# Patient Record
Sex: FEMALE | Race: WHITE | Employment: UNEMPLOYED | URBAN - METROPOLITAN AREA
[De-identification: names, ages, dates, MRNs, and addresses within clinical notes are randomized per-mention and may not be internally consistent; named-entity substitution may affect disease eponyms.]

---

## 2017-01-19 ENCOUNTER — TELEPHONE (OUTPATIENT)
Dept: FAMILY MEDICINE CLINIC | Age: 42
End: 2017-01-19

## 2017-01-25 ENCOUNTER — OFFICE VISIT (OUTPATIENT)
Dept: FAMILY MEDICINE CLINIC | Age: 42
End: 2017-01-25

## 2017-01-25 VITALS
OXYGEN SATURATION: 98 % | DIASTOLIC BLOOD PRESSURE: 70 MMHG | WEIGHT: 121 LBS | BODY MASS INDEX: 20.14 KG/M2 | RESPIRATION RATE: 16 BRPM | HEART RATE: 78 BPM | SYSTOLIC BLOOD PRESSURE: 110 MMHG

## 2017-01-25 DIAGNOSIS — M79.7 FIBROMYALGIA: Primary | ICD-10-CM

## 2017-01-25 DIAGNOSIS — H72.92 OTITIS MEDIA, SEROUS, TM RUPTURE, LEFT: ICD-10-CM

## 2017-01-25 DIAGNOSIS — H65.92 OTITIS MEDIA, SEROUS, TM RUPTURE, LEFT: ICD-10-CM

## 2017-01-25 DIAGNOSIS — F50.2 BULIMIA NERVOSA, PURGING TYPE: ICD-10-CM

## 2017-01-25 PROCEDURE — 99214 OFFICE O/P EST MOD 30 MIN: CPT | Performed by: FAMILY MEDICINE

## 2017-01-25 RX ORDER — CIPROFLOXACIN AND DEXAMETHASONE 3; 1 MG/ML; MG/ML
4 SUSPENSION/ DROPS AURICULAR (OTIC) 2 TIMES DAILY
Qty: 1 BOTTLE | Refills: 1 | Status: SHIPPED | OUTPATIENT
Start: 2017-01-25 | End: 2017-02-04

## 2017-01-25 RX ORDER — GABAPENTIN 100 MG/1
CAPSULE ORAL
Qty: 60 CAPSULE | Refills: 2 | Status: SHIPPED | OUTPATIENT
Start: 2017-01-25 | End: 2017-10-16 | Stop reason: ALTCHOICE

## 2017-01-30 ENCOUNTER — OFFICE VISIT (OUTPATIENT)
Dept: PSYCHOLOGY | Age: 42
End: 2017-01-30

## 2017-01-30 DIAGNOSIS — F50.2 BULIMIA NERVOSA, PURGING TYPE: Primary | ICD-10-CM

## 2017-01-30 PROCEDURE — 90832 PSYTX W PT 30 MINUTES: CPT | Performed by: PSYCHOLOGIST

## 2017-03-15 DIAGNOSIS — T78.40XD ALLERGY, SUBSEQUENT ENCOUNTER: ICD-10-CM

## 2017-03-15 DIAGNOSIS — R05.9 COUGH: ICD-10-CM

## 2017-03-15 RX ORDER — MONTELUKAST SODIUM 10 MG/1
TABLET ORAL
Qty: 30 TABLET | Refills: 5 | Status: SHIPPED | OUTPATIENT
Start: 2017-03-15 | End: 2017-10-16 | Stop reason: ALTCHOICE

## 2017-04-07 ENCOUNTER — OFFICE VISIT (OUTPATIENT)
Dept: FAMILY MEDICINE CLINIC | Age: 42
End: 2017-04-07

## 2017-04-07 VITALS
SYSTOLIC BLOOD PRESSURE: 104 MMHG | WEIGHT: 123 LBS | DIASTOLIC BLOOD PRESSURE: 62 MMHG | OXYGEN SATURATION: 98 % | HEART RATE: 77 BPM | BODY MASS INDEX: 20.47 KG/M2 | TEMPERATURE: 98.8 F

## 2017-04-07 DIAGNOSIS — J01.90 ACUTE NON-RECURRENT SINUSITIS, UNSPECIFIED LOCATION: Primary | ICD-10-CM

## 2017-04-07 DIAGNOSIS — G43.909 MIGRAINE WITHOUT STATUS MIGRAINOSUS, NOT INTRACTABLE, UNSPECIFIED MIGRAINE TYPE: ICD-10-CM

## 2017-04-07 PROCEDURE — 99214 OFFICE O/P EST MOD 30 MIN: CPT | Performed by: FAMILY MEDICINE

## 2017-04-07 PROCEDURE — 96372 THER/PROPH/DIAG INJ SC/IM: CPT | Performed by: FAMILY MEDICINE

## 2017-04-07 RX ORDER — AZELASTINE 1 MG/ML
SPRAY, METERED NASAL
Qty: 1 BOTTLE | Refills: 11 | Status: SHIPPED | OUTPATIENT
Start: 2017-04-07 | End: 2017-10-16 | Stop reason: ALTCHOICE

## 2017-04-07 RX ORDER — PROMETHAZINE HYDROCHLORIDE 25 MG/1
25 TABLET ORAL EVERY 8 HOURS PRN
Qty: 10 TABLET | Refills: 0 | Status: SHIPPED | OUTPATIENT
Start: 2017-04-07 | End: 2017-04-14

## 2017-04-07 RX ORDER — AMOXICILLIN AND CLAVULANATE POTASSIUM 875; 125 MG/1; MG/1
1 TABLET, FILM COATED ORAL 2 TIMES DAILY
Qty: 20 TABLET | Refills: 0 | Status: SHIPPED | OUTPATIENT
Start: 2017-04-07 | End: 2019-05-09 | Stop reason: SDUPTHER

## 2017-04-07 RX ORDER — FLUTICASONE PROPIONATE 50 MCG
SPRAY, SUSPENSION (ML) NASAL
Qty: 1 BOTTLE | Refills: 11 | Status: SHIPPED | OUTPATIENT
Start: 2017-04-07 | End: 2017-10-16 | Stop reason: ALTCHOICE

## 2017-04-11 ASSESSMENT — ENCOUNTER SYMPTOMS
SHORTNESS OF BREATH: 0
HOARSE VOICE: 0
COUGH: 1
SINUS PRESSURE: 1
SORE THROAT: 0
SWOLLEN GLANDS: 1

## 2017-06-29 RX ORDER — RIZATRIPTAN BENZOATE 10 MG/1
TABLET, ORALLY DISINTEGRATING ORAL
Qty: 12 TABLET | Refills: 5 | Status: SHIPPED | OUTPATIENT
Start: 2017-06-29 | End: 2017-10-16 | Stop reason: ALTCHOICE

## 2017-08-15 ENCOUNTER — OFFICE VISIT (OUTPATIENT)
Dept: FAMILY MEDICINE CLINIC | Age: 42
End: 2017-08-15

## 2017-08-15 VITALS
SYSTOLIC BLOOD PRESSURE: 108 MMHG | DIASTOLIC BLOOD PRESSURE: 76 MMHG | TEMPERATURE: 98.9 F | HEART RATE: 64 BPM | WEIGHT: 124 LBS | BODY MASS INDEX: 20.63 KG/M2

## 2017-08-15 DIAGNOSIS — R53.83 FATIGUE, UNSPECIFIED TYPE: Primary | ICD-10-CM

## 2017-08-15 DIAGNOSIS — R53.83 FATIGUE, UNSPECIFIED TYPE: ICD-10-CM

## 2017-08-15 DIAGNOSIS — M54.2 NECK PAIN: Primary | ICD-10-CM

## 2017-08-15 PROCEDURE — 99213 OFFICE O/P EST LOW 20 MIN: CPT | Performed by: NURSE PRACTITIONER

## 2017-08-15 RX ORDER — MELOXICAM 7.5 MG/1
7.5 TABLET ORAL DAILY
Qty: 30 TABLET | Refills: 3 | Status: SHIPPED | OUTPATIENT
Start: 2017-08-15 | End: 2017-10-16 | Stop reason: ALTCHOICE

## 2017-08-15 RX ORDER — METHOCARBAMOL 500 MG/1
500 TABLET, FILM COATED ORAL 4 TIMES DAILY
Qty: 40 TABLET | Refills: 0 | Status: SHIPPED | OUTPATIENT
Start: 2017-08-15 | End: 2017-08-25

## 2017-08-16 ENCOUNTER — NURSE ONLY (OUTPATIENT)
Dept: FAMILY MEDICINE CLINIC | Age: 42
End: 2017-08-16

## 2017-08-16 DIAGNOSIS — R53.82 CHRONIC FATIGUE: ICD-10-CM

## 2017-08-16 DIAGNOSIS — R53.83 FATIGUE, UNSPECIFIED TYPE: ICD-10-CM

## 2017-08-16 LAB
BASOPHILS ABSOLUTE: 0 K/UL (ref 0–0.2)
BASOPHILS RELATIVE PERCENT: 0.6 %
EOSINOPHILS ABSOLUTE: 0.2 K/UL (ref 0–0.6)
EOSINOPHILS RELATIVE PERCENT: 4.6 %
FOLATE: >20 NG/ML (ref 4.78–24.2)
HCT VFR BLD CALC: 42.5 % (ref 36–48)
HEMOGLOBIN: 14.2 G/DL (ref 12–16)
LYMPHOCYTES ABSOLUTE: 1 K/UL (ref 1–5.1)
LYMPHOCYTES RELATIVE PERCENT: 26 %
MCH RBC QN AUTO: 31 PG (ref 26–34)
MCHC RBC AUTO-ENTMCNC: 33.3 G/DL (ref 31–36)
MCV RBC AUTO: 92.9 FL (ref 80–100)
MONOCYTES ABSOLUTE: 0.3 K/UL (ref 0–1.3)
MONOCYTES RELATIVE PERCENT: 7.6 %
NEUTROPHILS ABSOLUTE: 2.4 K/UL (ref 1.7–7.7)
NEUTROPHILS RELATIVE PERCENT: 61.2 %
PDW BLD-RTO: 14 % (ref 12.4–15.4)
PLATELET # BLD: 200 K/UL (ref 135–450)
PMV BLD AUTO: 11.4 FL (ref 5–10.5)
RBC # BLD: 4.58 M/UL (ref 4–5.2)
TSH REFLEX FT4: 1.03 UIU/ML (ref 0.27–4.2)
VITAMIN B-12: 1857 PG/ML (ref 211–911)
WBC # BLD: 4 K/UL (ref 4–11)

## 2017-08-16 PROCEDURE — 36415 COLL VENOUS BLD VENIPUNCTURE: CPT | Performed by: NURSE PRACTITIONER

## 2017-08-17 LAB — VITAMIN D 25-HYDROXY: 83.7 NG/ML

## 2017-08-29 ENCOUNTER — TELEPHONE (OUTPATIENT)
Dept: FAMILY MEDICINE CLINIC | Age: 42
End: 2017-08-29

## 2017-08-29 ENCOUNTER — HOSPITAL ENCOUNTER (OUTPATIENT)
Dept: OTHER | Age: 42
Discharge: HOME | End: 2017-08-29
Attending: NURSE PRACTITIONER | Admitting: NURSE PRACTITIONER

## 2017-08-29 ENCOUNTER — HOSPITAL ENCOUNTER (OUTPATIENT)
Dept: GENERAL RADIOLOGY | Age: 42
Discharge: HOME | End: 2017-08-29

## 2017-08-29 DIAGNOSIS — M54.2 NECK PAIN: ICD-10-CM

## 2017-08-29 DIAGNOSIS — M54.2 NECK PAIN: Primary | ICD-10-CM

## 2017-08-30 DIAGNOSIS — M54.2 NECK PAIN: Primary | ICD-10-CM

## 2017-09-07 ENCOUNTER — HOSPITAL ENCOUNTER (OUTPATIENT)
Dept: PHYSICAL THERAPY | Age: 42
Discharge: HOME | End: 2017-09-30
Admitting: NURSE PRACTITIONER

## 2017-09-07 ASSESSMENT — PAIN DESCRIPTION - PAIN TYPE: TYPE: CHRONIC PAIN

## 2017-09-07 ASSESSMENT — PAIN SCALES - GENERAL: PAINLEVEL_OUTOF10: 6

## 2017-09-07 ASSESSMENT — PAIN DESCRIPTION - LOCATION: LOCATION: NECK

## 2017-09-07 NOTE — FLOWSHEET NOTE
medical complications  [] Other:     Goals:    Short term goals  Time Frame for Short term goals: 4 weeks  Short term goal 1: pt will be indep in HEP  Short term goal 2: pt will decrease pain 50%  Short term goal 3: pt will demonstrate improved scpaular resting position BUE  Short term goal 4: pt will increase B scapular muscle strength to 4/5  Short term goal 5: pt will return tp sitting for greater than 30 minutes without increase in symptoms           Plan: [x] Continue per plan of care [] Alter current plan (see comments)   [x] Plan of care initiated [] Hold pending MD visit [] Discharge      Plan for Next Session:  Biomechanical correction of problems as they present. Facilitate correct muscle firing patterns and activation with appropriate activities. Progress patient as tolerated.      Re-Certification Due Date:         Signature:  Mike Mcdonough PT
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
## 2017-09-26 ENCOUNTER — OFFICE VISIT (OUTPATIENT)
Dept: ENT CLINIC | Age: 42
End: 2017-09-26

## 2017-09-26 ENCOUNTER — TELEPHONE (OUTPATIENT)
Dept: ENT CLINIC | Age: 42
End: 2017-09-26

## 2017-09-26 VITALS
BODY MASS INDEX: 20.59 KG/M2 | DIASTOLIC BLOOD PRESSURE: 64 MMHG | TEMPERATURE: 98 F | HEART RATE: 89 BPM | WEIGHT: 123.6 LBS | SYSTOLIC BLOOD PRESSURE: 102 MMHG | HEIGHT: 65 IN

## 2017-09-26 DIAGNOSIS — H72.92 PERFORATION OF TYMPANIC MEMBRANE, LEFT: Primary | ICD-10-CM

## 2017-09-26 DIAGNOSIS — H92.12 OTORRHEA OF LEFT EAR: ICD-10-CM

## 2017-09-26 PROCEDURE — 99212 OFFICE O/P EST SF 10 MIN: CPT | Performed by: OTOLARYNGOLOGY

## 2017-09-27 RX ORDER — CIPROFLOXACIN AND DEXAMETHASONE 3; 1 MG/ML; MG/ML
4 SUSPENSION/ DROPS AURICULAR (OTIC) 2 TIMES DAILY
Qty: 7.5 ML | Refills: 1 | OUTPATIENT
Start: 2017-09-27 | End: 2017-10-04

## 2017-10-16 ENCOUNTER — OFFICE VISIT (OUTPATIENT)
Dept: FAMILY MEDICINE CLINIC | Age: 42
End: 2017-10-16

## 2017-10-16 VITALS
TEMPERATURE: 98.7 F | WEIGHT: 125 LBS | OXYGEN SATURATION: 98 % | BODY MASS INDEX: 21.12 KG/M2 | DIASTOLIC BLOOD PRESSURE: 70 MMHG | SYSTOLIC BLOOD PRESSURE: 102 MMHG | HEART RATE: 64 BPM

## 2017-10-16 DIAGNOSIS — F31.9 BIPOLAR DEPRESSION (HCC): Primary | ICD-10-CM

## 2017-10-16 DIAGNOSIS — Z23 NEEDS FLU SHOT: ICD-10-CM

## 2017-10-16 PROCEDURE — 90471 IMMUNIZATION ADMIN: CPT | Performed by: FAMILY MEDICINE

## 2017-10-16 PROCEDURE — 99214 OFFICE O/P EST MOD 30 MIN: CPT | Performed by: FAMILY MEDICINE

## 2017-10-16 PROCEDURE — 90630 INFLUENZA, QUADV, 18-64 YRS, ID, PF, MICRO INJ, 0.1ML (FLUZONE QUADV, PF): CPT | Performed by: FAMILY MEDICINE

## 2017-10-16 RX ORDER — LAMOTRIGINE 25 MG/1
25 TABLET ORAL 2 TIMES DAILY
Qty: 60 TABLET | Refills: 1 | Status: SHIPPED | OUTPATIENT
Start: 2017-10-16 | End: 2017-12-03 | Stop reason: SDUPTHER

## 2017-10-17 ASSESSMENT — ENCOUNTER SYMPTOMS
WHEEZING: 0
ABDOMINAL DISTENTION: 0
CONSTIPATION: 0
CHEST TIGHTNESS: 0
DIARRHEA: 0
VOMITING: 0

## 2017-10-30 ENCOUNTER — OFFICE VISIT (OUTPATIENT)
Dept: FAMILY MEDICINE CLINIC | Age: 42
End: 2017-10-30

## 2017-10-30 VITALS
WEIGHT: 125.6 LBS | SYSTOLIC BLOOD PRESSURE: 110 MMHG | HEART RATE: 68 BPM | DIASTOLIC BLOOD PRESSURE: 70 MMHG | BODY MASS INDEX: 21.23 KG/M2 | OXYGEN SATURATION: 98 %

## 2017-10-30 DIAGNOSIS — N94.9 VAGINAL BURNING: Primary | ICD-10-CM

## 2017-10-30 DIAGNOSIS — F31.9 BIPOLAR DEPRESSION (HCC): ICD-10-CM

## 2017-10-30 LAB
BILIRUBIN, POC: NEGATIVE
BLOOD URINE, POC: NEGATIVE
CLARITY, POC: CLEAR
COLOR, POC: YELLOW
GLUCOSE URINE, POC: NEGATIVE
KETONES, POC: NEGATIVE
LEUKOCYTE EST, POC: NEGATIVE
NITRITE, POC: NEGATIVE
PH, POC: 8.5
PROTEIN, POC: NEGATIVE
SPECIFIC GRAVITY, POC: 1
UROBILINOGEN, POC: NEGATIVE

## 2017-10-30 PROCEDURE — 81002 URINALYSIS NONAUTO W/O SCOPE: CPT | Performed by: FAMILY MEDICINE

## 2017-10-30 PROCEDURE — 99213 OFFICE O/P EST LOW 20 MIN: CPT | Performed by: FAMILY MEDICINE

## 2017-10-30 RX ORDER — RIZATRIPTAN BENZOATE 10 MG/1
10 TABLET, ORALLY DISINTEGRATING ORAL
COMMUNITY
End: 2018-02-22

## 2017-11-09 ENCOUNTER — TELEPHONE (OUTPATIENT)
Dept: FAMILY MEDICINE CLINIC | Age: 42
End: 2017-11-09

## 2017-11-09 NOTE — TELEPHONE ENCOUNTER
Patient called in stating that while taking the medication Lamictal as prescribed, today she feels really sad and doesn't want to go anywhere. Please follow- up with this patient.

## 2017-12-12 ENCOUNTER — OFFICE VISIT (OUTPATIENT)
Dept: ENT CLINIC | Age: 42
End: 2017-12-12

## 2017-12-12 VITALS
BODY MASS INDEX: 19.29 KG/M2 | WEIGHT: 120 LBS | HEART RATE: 78 BPM | SYSTOLIC BLOOD PRESSURE: 115 MMHG | DIASTOLIC BLOOD PRESSURE: 74 MMHG | HEIGHT: 66 IN

## 2017-12-12 DIAGNOSIS — H72.92 PERFORATION OF TYMPANIC MEMBRANE, LEFT: Primary | ICD-10-CM

## 2017-12-12 DIAGNOSIS — H92.12 OTORRHEA, LEFT: ICD-10-CM

## 2017-12-12 PROCEDURE — 99212 OFFICE O/P EST SF 10 MIN: CPT | Performed by: OTOLARYNGOLOGY

## 2017-12-12 NOTE — PROGRESS NOTES
FOLLOW UP VISIT:      INTERIM HISTORY: Left-sided tympanic membrane perforation. Long standing. Has conductive hearing loss. Gets intermittent otorrhea. PAST MEDICAL HISTORY:   History   Smoking Status    Never Smoker   Smokeless Tobacco    Never Used                                                    History   Alcohol Use    Yes     Comment: once a month                                                     Current Outpatient Prescriptions:     lamoTRIgine (LAMICTAL) 25 MG tablet, Take 1 tablet by mouth 2 times daily, Disp: 60 tablet, Rfl: 5    rizatriptan (MAXALT-MLT) 10 MG disintegrating tablet, Take 10 mg by mouth once as needed for Migraine May repeat in 2 hours if needed, Disp: , Rfl:     LORazepam (ATIVAN) 0.5 MG tablet, TAKE ONE TABLET BY MOUTH DAILY, Disp: 20 tablet, Rfl: 0    FAMCICLOVIR PO, Take by mouth 2 times daily, Disp: , Rfl:                                                  Past Medical History:   Diagnosis Date    Allergy history unknown     Anxiety     Bulimia     Depression     Has been dx'd with bipolar 2, ocd, personality d/o as well. Unclear what dx is correct.  Fibromyalgia     Migraine     b/l    Perforation of tympanic membrane     Dr Yusuf Gutierrez, multipe perforations                                                    Past Surgical History:   Procedure Laterality Date    BREAST IMPLANT REMOVAL  1/14    BREAST SURGERY      INNER EAR SURGERY Right     mastoidectomy, age 16 or 16, had cholesteocytoma    MASTOIDECTOMY      TONSILLECTOMY      TYMPANIC MEMBRANE REPAIR Right          REVIEW OF SYSTEMS:  Al pertinent positive and negative findings included in HPI.   Otherwise, all other systems are reviewed and are negative    PHYSICAL EXAMINATION:   GENERAL: wdwn- no acute distress  COMMUNICATION :  Normal voice  MENTAL STATUS:  Normal  HEAD AND FACE:  Normal  EXTERNAL EARS AND NOSE:  Normal  FACIAL MUSCLES:  Normal  FACE PALPATION:  Negative  OTOSCOPY:  Left-sided Central

## 2017-12-14 ENCOUNTER — OFFICE VISIT (OUTPATIENT)
Dept: FAMILY MEDICINE CLINIC | Age: 42
End: 2017-12-14

## 2017-12-14 VITALS
WEIGHT: 124 LBS | TEMPERATURE: 98.6 F | BODY MASS INDEX: 20.01 KG/M2 | DIASTOLIC BLOOD PRESSURE: 70 MMHG | SYSTOLIC BLOOD PRESSURE: 110 MMHG | HEART RATE: 68 BPM

## 2017-12-14 DIAGNOSIS — Z01.818 PREOP EXAMINATION: Primary | ICD-10-CM

## 2017-12-14 PROCEDURE — 99242 OFF/OP CONSLTJ NEW/EST SF 20: CPT | Performed by: NURSE PRACTITIONER

## 2017-12-14 NOTE — PROGRESS NOTES
[address] [employer]                          Date of Procedure 12-15-17 Time of Procedure 1300    PRIOR TO PROCEDURE DATE:  1. Please follow any guidelines/instructions prior to your procedure as advised by your surgeon. 2. Arrange for someone to drive you home and be with you for the first 24 hours after discharge for your safety after your procedure for which you received sedation. Ensure it is someone we can share information with regarding your discharge. 3. You must contact your surgeon for instructions IF:   You are taking any blood thinners, aspirin, anti-inflammatory or vitamin E.   There is a change in your physical condition such as a cold, fever, rash, cuts, sores or any other infection, especially near your surgical site. 4. Do not drink alcohol the day before or day of your procedure. 5. A Pre-op History and Physical for surgery MUST be completed by your Physician or Urgent Care within 30 days of your procedure date. Please bring a copy with you on the day of your procedure and along with any other testing performed. THE DAY OF YOUR PROCEDURE:  1. Follow instructions for ARRIVAL TIME as DIRECTED BY YOUR SURGEON. If your surgeon does not give you a specific arrival time, please arrive at  1100.    2. Enter the MAIN entrance from [facility] and follow the signs to the free [facility] or [email or web address] parking (offered free of charge 6am-5pm). 3. Enter the Main Entrance of the hospital (do not enter from the lower level of the parking garage). Upon entrance, check in with the  at the main desk on your left. If no one is available at the desk, proceed into the [facility] Waiting Room and go through the door directly into the [facility]. There is a Check-in desk ACROSS from Room 5 (marked with a sign hanging from the ceiling). The phone number for the surgery center is [phone].     4. Please call [phone] option #2 option have any large items you may need brought in by your family after your arrival to your hospital room. 15. If you have a Living Will or Durable Power of , please bring a copy on the day of your procedure. 15. With your permission, one family member may accompany you while you are being prepared for surgery. Once you are ready, additional family members may join you. HOW WE KEEP YOU SAFE and WORK TO PREVENT SURGICAL SITE INFECTIONS:  1. Health care workers should always check your ID bracelet to verify your name and birth date. You will be asked many times to state your name, date of birth, and allergies. 2. Health care workers should always clean their hands with soap or alcohol gel before providing care to you. It is okay to ask anyone if they cleaned their hands before they touch you. 3. You will be actively involved in verifying the type of procedure you are having and ensuring the correct surgical site. This will be confirmed multiple times prior to your procedure. Do NOT ru your surgery site UNLESS instructed to by your surgeon. 4. Do not shave or wax for 72 hours prior to procedure near your operative site. Shaving with a razor can irritate your skin and make it easier to develop an infection. On the day of your procedure, any hair that needs to be removed near the surgical site will be clipped by a healthcare worker using a special clippers designed to avoid skin irritation. 5. When you are in the operating room, your surgical site will be cleansed with a special soap, and in most cases, you will be given an antibiotic before the surgery begins. AFTER YOUR PROCEDURE:  1. For comfort and safety, arrange to have someone at home with you for the first 24 hours after discharge. 2. You and your family will be given written instructions about your diet, activity, dressing care, medications, and return visits.      3. Always clean your hands before and after caring for your

## 2017-12-14 NOTE — PROGRESS NOTES
[facility]  [phone]  Fax: [phone]   Pre-operative History and Physical      DIAGNOSIS:  Left sided tympanic membrane perforation with conductive hearing loss. PROCEDURE:  Left Tympanoplasty      History Obtained From:  patient    HISTORY OF PRESENT ILLNESS:    The patient is a 40 y.o. female with significant past medical history of left otalgia who presents for pre op exam.       Past Medical History:   Diagnosis Date    Allergy history unknown     Anxiety     Bulimia     Depression     Has been dx'd with bipolar 2, ocd, personality d/o as well. Unclear what dx is correct.  Fibromyalgia     Migraine     b/l    Perforation of tympanic membrane     Dr Rigoberto Hill, multipe perforations     Past Surgical History:   Procedure Laterality Date    BREAST IMPLANT REMOVAL  1/14    BREAST SURGERY      INNER EAR SURGERY Right     mastoidectomy, age 16 or 16, had cholesteocytoma    MASTOIDECTOMY      TONSILLECTOMY      TYMPANIC MEMBRANE REPAIR Right      Current Outpatient Prescriptions   Medication Sig Dispense Refill    rizatriptan (MAXALT-MLT) 10 MG disintegrating tablet Take 10 mg by mouth once as needed for Migraine May repeat in 2 hours if needed      LORazepam (ATIVAN) 0.5 MG tablet TAKE ONE TABLET BY MOUTH DAILY 20 tablet 0    FAMCICLOVIR PO Take by mouth 2 times daily      CIPRODEX 0.3-0.1 % otic suspension        No current facility-administered medications for this visit. Allergies:  Celebrex [celecoxib]; Codeine; Darvocet [propoxyphene n-acetaminophen]; Percocet [oxycodone-acetaminophen]; Ultram [tramadol hcl]; and Vicodin [hydrocodone-acetaminophen]  History of allergic reaction to anesthesia:  No     History   Smoking Status    Never Smoker   Smokeless Tobacco    Never Used     The patient states she drinks 0 per week.     Family History   Problem Relation Age of Onset    Other Mother      fibromyalgia    Mult Sclerosis Sister        REVIEW OF SYSTEMS: CONSTITUTIONAL:  negative  EYES:  negative  HEENT:  negative except for  Left ear pain  RESPIRATORY:  negative  CARDIOVASCULAR:  negative  GASTROINTESTINAL:  negative  GENITOURINARY:  negative  INTEGUMENT/BREAST:  negative  HEMATOLOGIC/LYMPHATIC:  negative  ALLERGIC/IMMUNOLOGIC:  negative  ENDOCRINE:  negative  MUSCULOSKELETAL:  Has some neck soreness at times  NEUROLOGICAL:  negative    PHYSICAL EXAM:      /70   Pulse 68   Temp 98.6 °F (37 °C) (Oral)   Wt 124 lb (56.2 kg)   LMP 11/17/2017   BMI 20.01 kg/m²     CONSTITUTIONAL:  awake, alert, cooperative, no apparent distress, and appears stated age    Eyes:  Lids and lashes normal, pupils equal, round and reactive to light, extra ocular muscles intact, sclera clear, conjunctiva normal    Head/ENT:  Normocephalic, without obvious abnormality, atramatic, sinuses nontender on palpation, external ears without lesions, oral pharynx with moist mucus membranes, tonsils without erythema or exudates, gums normal and good dentition. Bilateral TMs abnormal appearance and shape. Neck:  Supple, symmetrical, trachea midline, no adenopathy, thyroid symmetric, not enlarged and no tenderness, skin normal, No carotid bruit    Heart:  Normal apical impulse, regular rate and rhythm, normal S1 and S2, no S3 or S4, and no murmur noted    Lungs:  No increased work of breathing, good air exchange, clear to auscultation bilaterally, no crackles or wheezing    Abdomen:  No scars, normal bowel sounds, soft, non-distended, non-tender, no masses palpated, no hepatosplenomegally    Extremities:  No clubbing, cyanosis, or edema    NEUROLOGIC:  Awake, alert, oriented to name, place and time. Cranial nerves II-XII are grossly intact. Motor is 5 out of 5 bilaterally. ASSESSMENT AND PLAN:    1. Patient is a 40 y.o. female with above specified procedure planned on 12/15/17 with Dr. Cristiane Ferreira at [facility]  They will not require cardiology evaluation prior to procedure. 2. Stop ASA/NSAIDS medications 7-10 days prior to procedure.   3.Patient is cleared for surgery    Ham Banks, [address] [facility] SPECIAL[facility] OF [address]  [phone]

## 2017-12-15 ENCOUNTER — HOSPITAL ENCOUNTER (OUTPATIENT)
Dept: PREADMISSION TESTING | Age: 42
Discharge: HOME | End: 2017-12-15
Admitting: OTOLARYNGOLOGY

## 2017-12-15 VITALS
TEMPERATURE: 98.9 F | DIASTOLIC BLOOD PRESSURE: 70 MMHG | HEART RATE: 73 BPM | HEIGHT: 65 IN | WEIGHT: 124 LBS | OXYGEN SATURATION: 100 % | BODY MASS INDEX: 20.66 KG/M2 | SYSTOLIC BLOOD PRESSURE: 111 MMHG | RESPIRATION RATE: 16 BRPM

## 2017-12-15 LAB
GLUCOSE BLD-MCNC: 72 MG/DL (ref 70–99)
PERFORMED ON: NORMAL
PREGNANCY, URINE: NEGATIVE

## 2017-12-15 PROCEDURE — 69631 REPAIR EARDRUM STRUCTURES: CPT | Performed by: OTOLARYNGOLOGY

## 2017-12-15 RX ORDER — MEPERIDINE HYDROCHLORIDE 25 MG/ML
12.5 INJECTION INTRAMUSCULAR; INTRAVENOUS; SUBCUTANEOUS EVERY 5 MIN PRN
Status: DISCONTINUED | OUTPATIENT
Start: 2017-12-15 | End: 2017-12-16 | Stop reason: HOSPADM

## 2017-12-15 RX ORDER — LABETALOL HYDROCHLORIDE 5 MG/ML
5 INJECTION, SOLUTION INTRAVENOUS EVERY 10 MIN PRN
Status: DISCONTINUED | OUTPATIENT
Start: 2017-12-15 | End: 2017-12-16 | Stop reason: HOSPADM

## 2017-12-15 RX ORDER — PROMETHAZINE HYDROCHLORIDE 25 MG/ML
6.25 INJECTION, SOLUTION INTRAMUSCULAR; INTRAVENOUS
Status: ACTIVE | OUTPATIENT
Start: 2017-12-15 | End: 2017-12-15

## 2017-12-15 RX ORDER — ONDANSETRON 2 MG/ML
4 INJECTION INTRAMUSCULAR; INTRAVENOUS
Status: ACTIVE | OUTPATIENT
Start: 2017-12-15 | End: 2017-12-15

## 2017-12-15 RX ORDER — FENTANYL CITRATE 50 UG/ML
25 INJECTION, SOLUTION INTRAMUSCULAR; INTRAVENOUS EVERY 5 MIN PRN
Status: DISCONTINUED | OUTPATIENT
Start: 2017-12-15 | End: 2017-12-16 | Stop reason: HOSPADM

## 2017-12-15 RX ORDER — SODIUM CHLORIDE, SODIUM LACTATE, POTASSIUM CHLORIDE, CALCIUM CHLORIDE 600; 310; 30; 20 MG/100ML; MG/100ML; MG/100ML; MG/100ML
INJECTION, SOLUTION INTRAVENOUS CONTINUOUS
Status: DISCONTINUED | OUTPATIENT
Start: 2017-12-15 | End: 2017-12-16 | Stop reason: HOSPADM

## 2017-12-15 RX ORDER — HYDRALAZINE HYDROCHLORIDE 20 MG/ML
5 INJECTION INTRAMUSCULAR; INTRAVENOUS EVERY 10 MIN PRN
Status: DISCONTINUED | OUTPATIENT
Start: 2017-12-15 | End: 2017-12-16 | Stop reason: HOSPADM

## 2017-12-15 RX ADMIN — SODIUM CHLORIDE, SODIUM LACTATE, POTASSIUM CHLORIDE, CALCIUM CHLORIDE: 600; 310; 30; 20 INJECTION, SOLUTION INTRAVENOUS at 11:25

## 2017-12-15 RX ADMIN — FENTANYL CITRATE 25 MCG: 50 INJECTION, SOLUTION INTRAMUSCULAR; INTRAVENOUS at 16:01

## 2017-12-15 ASSESSMENT — PAIN DESCRIPTION - DESCRIPTORS
DESCRIPTORS: ACHING

## 2017-12-15 ASSESSMENT — PAIN - FUNCTIONAL ASSESSMENT: PAIN_FUNCTIONAL_ASSESSMENT: 0-10

## 2017-12-15 ASSESSMENT — PAIN SCALES - GENERAL
PAINLEVEL_OUTOF10: 6
PAINLEVEL_OUTOF10: 0
PAINLEVEL_OUTOF10: 4
PAINLEVEL_OUTOF10: 0
PAINLEVEL_OUTOF10: 0

## 2017-12-15 ASSESSMENT — PAIN DESCRIPTION - PROGRESSION: CLINICAL_PROGRESSION_2: GRADUALLY IMPROVING

## 2017-12-15 NOTE — BRIEF OP NOTE
Brief Postoperative Note    Lee Villalta  YOB: 1975  [id number]    Pre-operative Diagnosis: Perforation left tympanic membrane    Post-operative Diagnosis: Same    Procedure: Left transcanal tympanoplasty    Anesthesia: General    Surgeons/Assistants:  Jaye Rader    Estimated Blood Loss: less than 50     Complications: None    Specimens: Was Not Obtained      Electronically signed by Johanny Wise MD on 12/15/2017 at 2:16 PM

## 2017-12-15 NOTE — OP NOTE
[address] [facility], [address] [facility]                                 OPERATIVE REPORT    PATIENT NAME: James Hernández                :        1975  MED REC NO:   [id number]                          ROOM:  ACCOUNT NO:   [de-identified]                          ADMIT DATE: 12/15/2017  PROVIDER:     Rishabh Roberts MD    DATE OF PROCEDURE:  12/15/2017    PREOPERATIVE DIAGNOSIS:  Central perforation, left tympanic membrane. POSTOPERATIVE DIAGNOSIS:  Central perforation, left tympanic membrane. OPERATION:  Left transcanal tympanoplasty with medial placement of  temporalis fascia graft. SURGEON:  Rishabh Roberts MD    ANESTHESIA:  General with endotracheal intubation. PROCEDURE:  Under satisfactory general anesthesia, the left ear was prepped  and draped in a sterile manner. The external ear canal and the  postauricular area were injected with lidocaine 1% with epinephrine  1:100,000, 4 ml were used. The microscope was draped and brought in. There was central perforation of the left tympanic membrane. The middle  ear mucosa appeared normal.  The edges of the perforation were freshened  with a cup forceps and a sickle knife. A postauricular incision was made  with a cautery pencil set at 25 cut and 25 spray, blend one. The incision  was carried down to the temporalis muscle. Fascia overlying the temporalis  was harvested and set aside for later grafting purposes. The postauricular  incision was closed with multiple sutures of 5-0 nylon. The middle ear  space was filled with Gelfoam impregnated with topical adrenaline. The  previously harvested temporalis fascia was grafted medial to the tympanic  membrane. The external auditory canal was filled with more Gelfoam  impregnated with topical adrenaline. A cotton ball was placed in the  meatus. The dressing was two Band-Aids.   The patient tolerated the  procedure well and was transferred to the recovery room in good condition.         Randy Tuttle MD    D: 12/15/2017 14:19:59       T: 12/15/2017 14:22:10     [email or web address]  Job#: [id number]     Doc#: [id number]    CC:

## 2017-12-15 NOTE — ANESTHESIA POST-OP
Anesthesia Post-op Note    Patient: Jenny Jarvis  MRN: [id number]  YOB: 1975  Date of evaluation: 12/15/2017  Time:  5:26 PM     Procedure(s) Performed: Left transcanal tympanoplasty with medial placement of  temporalis fascia graft.     Last Vitals: /70   Pulse 73   Temp 98.9 °F (37.2 °C) (Temporal)   Resp 16   Ht 5' 5\" (1.651 m)   Wt 124 lb (56.2 kg)   LMP 12/14/2017   SpO2 100%   BMI 20.63 kg/m²     Tracy Phase I: Tracy Score: 10    Tracy Phase II: Tracy Score: 10    Anesthesia Post Evaluation    Final anesthesia type: general  Patient location during evaluation: PACU  Level of consciousness: awake and alert  Airway patency: patent  Nausea & Vomiting: no nausea and no vomiting  Complications: no  Cardiovascular status: hemodynamically stable  Respiratory status: acceptable  Hydration status: euvolemic        Fabiano Marcus MD  5:26 PM

## 2017-12-15 NOTE — PROGRESS NOTES
Pt admitted from OR to  PACU bed 4  at 1410 post LEFT TYMPANOPLASTY. RN handoff report received from crna. Pt was reportedly stable throughout surgery. Pt placed on PACU monitors. Patient reports pain level to be 0 on a 1-10 scale. PACU RN remains at bedside and will continue to monitor closely.

## 2017-12-15 NOTE — PROGRESS NOTES
Crutches   []Drain    [] Amie Laureano   []Other  [] Inpatient / significant other understands the plan for transfer to the inpatient unit

## 2017-12-15 NOTE — ANESTHESIA PRE-OP
ANESTHESIA PRE-OPERATIVE EVALUATION    Patient Name: Lee Villalta YOB: 1975   Sex: Female Age: 40 yrs     Medical Record Number: [id number] Acct Number: [de-identified]     Date of Procedure: 12/15/2017 Time of Procedure: 1300     Preoperative Diagnosis: PEFORATION LEFT EAR DRUM CONDUCTI   Procedure: Left tympanoplasty   Surgeon: Johanny Wise MD    VITAL SIGNS   Vitals:    12/15/17 1044   BP: 103/68   Pulse: 70   Resp: 16   Temp: 98.1 °F (36.7 °C)   SpO2: 100%      Height Height: 5' 5\" (165.1 cm)   Weight Weight: 124 lb (56.2 kg)   BMI Body mass index is 20.63 kg/m². Allergies Allergies   Allergen Reactions    Codeine Nausea Only    Ultram [Tramadol Hcl] Nausea Only    Celebrex [Celecoxib] Itching and Rash    Darvocet [Propoxyphene N-Acetaminophen] Nausea And Vomiting    Percocet [Oxycodone-Acetaminophen] Nausea And Vomiting    Vicodin [Hydrocodone-Acetaminophen] Nausea And Vomiting      NPO Status >8 hours   IV Access     Isolation No active isolations     MEDICAL HISTORY   Past Medical History       Diagnosis Date    Allergy history unknown     Anxiety     Bulimia     Dental crowns present     molar    Depression     Has been dx'd with bipolar 2, ocd, personality d/o as well. Unclear what dx is correct.  Fibromyalgia     Migraine     b/l    Perforation of tympanic membrane     Dr Daisy Morales, multipe perforations      Exercise Tolerance   Patient is able to walk up a flight of stairs or 1-2 blocks without chest pain or shortness of breath? Yes   Anesthetic History   Personal History of Problems: No Family History of Problems: No   Surgical History    has a past surgical history that includes Inner ear surgery (Right); Tonsillectomy; Breast Implant Removal (1/14); tympanic membrane repair (Right); mastoidectomy; and Breast surgery (2013). Social History   Social History     Occupational History    Not on file.      Social History Main Topics    Smoking status: Never Risks and possible complications were explained. Complications include, but are not limited to, sore throat, dental injury or damage, trauma to soft tissue,  eye injury, corneal abrasion, adverse reaction to blood products and/or medications, bleeding, nerve  injury, cardiac or respiratory arrest, and death. The patient and patients family understands these complications and are agreeable to the anesthetic plan. Nursing staff present in room during this   discussion and consent. Informed consent obtained verbally from the patient.     Electronically signed by Fabiano Marcus on 12/15/2017

## 2017-12-20 ENCOUNTER — OFFICE VISIT (OUTPATIENT)
Dept: ENT CLINIC | Age: 42
End: 2017-12-20

## 2017-12-20 DIAGNOSIS — Z98.890 STATUS POST EAR SURGERY: Primary | ICD-10-CM

## 2017-12-20 PROCEDURE — 99024 POSTOP FOLLOW-UP VISIT: CPT | Performed by: OTOLARYNGOLOGY

## 2017-12-21 ENCOUNTER — TELEPHONE (OUTPATIENT)
Dept: VASCULAR SURGERY | Age: 42
End: 2017-12-21

## 2017-12-21 NOTE — TELEPHONE ENCOUNTER
Pt called stating that her ear is starting cause pain now and the Tylenol is not helping post-operatively. The patient can not take any rx opiates or ultram.  She contacted her breast surgeon to see what she was given for pain and told me they gave her dilaudid. I explained that we can not give her dilaudid and asked if she can tolerate NSAIDs. She stated to her knowledge she could and that she has a rx for Naproxen. I asked her to try taking a Naproxen with food and to call me back and let me know if it relieves her pain. Patient verbalized understanding.

## 2018-01-08 ENCOUNTER — OFFICE VISIT (OUTPATIENT)
Dept: ENT CLINIC | Age: 43
End: 2018-01-08

## 2018-01-08 DIAGNOSIS — Z98.890 STATUS POST EAR SURGERY: Primary | ICD-10-CM

## 2018-01-08 PROCEDURE — 99024 POSTOP FOLLOW-UP VISIT: CPT | Performed by: OTOLARYNGOLOGY

## 2018-01-17 ENCOUNTER — OFFICE VISIT (OUTPATIENT)
Dept: ENT CLINIC | Age: 43
End: 2018-01-17

## 2018-01-17 DIAGNOSIS — Z98.890 STATUS POST EAR SURGERY: Primary | ICD-10-CM

## 2018-01-17 PROCEDURE — 99024 POSTOP FOLLOW-UP VISIT: CPT | Performed by: OTOLARYNGOLOGY

## 2018-01-19 ENCOUNTER — HOSPITAL ENCOUNTER (OUTPATIENT)
Dept: MAMMOGRAPHY | Age: 43
Discharge: HOME | End: 2018-01-19
Attending: FAMILY MEDICINE | Admitting: FAMILY MEDICINE

## 2018-01-19 DIAGNOSIS — Z12.31 VISIT FOR SCREENING MAMMOGRAM: ICD-10-CM

## 2018-01-31 ENCOUNTER — OFFICE VISIT (OUTPATIENT)
Dept: ENT CLINIC | Age: 43
End: 2018-01-31

## 2018-01-31 DIAGNOSIS — Z98.890 STATUS POST EAR SURGERY: Primary | ICD-10-CM

## 2018-01-31 PROCEDURE — 99024 POSTOP FOLLOW-UP VISIT: CPT | Performed by: OTOLARYNGOLOGY

## 2018-01-31 NOTE — PROGRESS NOTES
Now 6 weeks following left tympanoplasty. No otalgia or otorrhea. Eschar in ear canal is still adherent.     Follow up 1 month

## 2018-02-22 RX ORDER — RIZATRIPTAN BENZOATE 10 MG/1
TABLET, ORALLY DISINTEGRATING ORAL
Qty: 12 TABLET | Refills: 5 | Status: SHIPPED | OUTPATIENT
Start: 2018-02-22 | End: 2018-08-10 | Stop reason: SDUPTHER

## 2018-03-19 ENCOUNTER — OFFICE VISIT (OUTPATIENT)
Dept: ENT CLINIC | Age: 43
End: 2018-03-19

## 2018-03-19 DIAGNOSIS — Z98.890 STATUS POST EAR SURGERY: Primary | ICD-10-CM

## 2018-03-19 PROCEDURE — 99024 POSTOP FOLLOW-UP VISIT: CPT | Performed by: OTOLARYNGOLOGY

## 2018-05-04 ENCOUNTER — TELEPHONE (OUTPATIENT)
Dept: FAMILY MEDICINE CLINIC | Age: 43
End: 2018-05-04

## 2018-05-07 ENCOUNTER — OFFICE VISIT (OUTPATIENT)
Dept: FAMILY MEDICINE CLINIC | Age: 43
End: 2018-05-07

## 2018-05-07 VITALS
TEMPERATURE: 98 F | BODY MASS INDEX: 21.3 KG/M2 | SYSTOLIC BLOOD PRESSURE: 100 MMHG | DIASTOLIC BLOOD PRESSURE: 70 MMHG | WEIGHT: 128 LBS | HEART RATE: 64 BPM

## 2018-05-07 DIAGNOSIS — F34.1 DYSTHYMIA: Primary | ICD-10-CM

## 2018-05-07 DIAGNOSIS — G89.29 CHRONIC MIDLINE LOW BACK PAIN WITHOUT SCIATICA: ICD-10-CM

## 2018-05-07 DIAGNOSIS — G43.909 MIGRAINE WITHOUT STATUS MIGRAINOSUS, NOT INTRACTABLE, UNSPECIFIED MIGRAINE TYPE: ICD-10-CM

## 2018-05-07 DIAGNOSIS — M54.50 CHRONIC MIDLINE LOW BACK PAIN WITHOUT SCIATICA: ICD-10-CM

## 2018-05-07 PROCEDURE — 99214 OFFICE O/P EST MOD 30 MIN: CPT | Performed by: NURSE PRACTITIONER

## 2018-05-07 PROCEDURE — G0444 DEPRESSION SCREEN ANNUAL: HCPCS | Performed by: NURSE PRACTITIONER

## 2018-05-07 RX ORDER — AMITRIPTYLINE HYDROCHLORIDE 10 MG/1
10 TABLET, FILM COATED ORAL NIGHTLY PRN
Qty: 30 TABLET | Refills: 3 | Status: SHIPPED | OUTPATIENT
Start: 2018-05-07 | End: 2018-07-12

## 2018-05-07 RX ORDER — FLUTICASONE PROPIONATE 50 MCG
1 SPRAY, SUSPENSION (ML) NASAL DAILY
COMMUNITY
End: 2018-07-12

## 2018-05-07 RX ORDER — CITALOPRAM 20 MG/1
TABLET ORAL
Qty: 30 TABLET | Refills: 3 | Status: SHIPPED | OUTPATIENT
Start: 2018-05-07 | End: 2018-07-12

## 2018-05-07 ASSESSMENT — ENCOUNTER SYMPTOMS: BACK PAIN: 1

## 2018-05-07 ASSESSMENT — PATIENT HEALTH QUESTIONNAIRE - PHQ9
SUM OF ALL RESPONSES TO PHQ9 QUESTIONS 1 & 2: 6
9. THOUGHTS THAT YOU WOULD BE BETTER OFF DEAD, OR OF HURTING YOURSELF: 1
3. TROUBLE FALLING OR STAYING ASLEEP: 3
5. POOR APPETITE OR OVEREATING: 3
10. IF YOU CHECKED OFF ANY PROBLEMS, HOW DIFFICULT HAVE THESE PROBLEMS MADE IT FOR YOU TO DO YOUR WORK, TAKE CARE OF THINGS AT HOME, OR GET ALONG WITH OTHER PEOPLE: 3
4. FEELING TIRED OR HAVING LITTLE ENERGY: 3
8. MOVING OR SPEAKING SO SLOWLY THAT OTHER PEOPLE COULD HAVE NOTICED. OR THE OPPOSITE, BEING SO FIGETY OR RESTLESS THAT YOU HAVE BEEN MOVING AROUND A LOT MORE THAN USUAL: 1
7. TROUBLE CONCENTRATING ON THINGS, SUCH AS READING THE NEWSPAPER OR WATCHING TELEVISION: 3
6. FEELING BAD ABOUT YOURSELF - OR THAT YOU ARE A FAILURE OR HAVE LET YOURSELF OR YOUR FAMILY DOWN: 3
1. LITTLE INTEREST OR PLEASURE IN DOING THINGS: 3
2. FEELING DOWN, DEPRESSED OR HOPELESS: 3
SUM OF ALL RESPONSES TO PHQ QUESTIONS 1-9: 23

## 2018-05-09 ENCOUNTER — OFFICE VISIT (OUTPATIENT)
Dept: ENT CLINIC | Age: 43
End: 2018-05-09

## 2018-05-09 VITALS
WEIGHT: 123 LBS | SYSTOLIC BLOOD PRESSURE: 96 MMHG | HEIGHT: 65 IN | DIASTOLIC BLOOD PRESSURE: 57 MMHG | HEART RATE: 71 BPM | TEMPERATURE: 98.6 F | BODY MASS INDEX: 20.49 KG/M2

## 2018-05-09 DIAGNOSIS — Z98.890 STATUS POST EAR SURGERY: Primary | ICD-10-CM

## 2018-05-09 PROCEDURE — 99024 POSTOP FOLLOW-UP VISIT: CPT | Performed by: OTOLARYNGOLOGY

## 2018-07-12 ENCOUNTER — OFFICE VISIT (OUTPATIENT)
Dept: FAMILY MEDICINE CLINIC | Age: 43
End: 2018-07-12

## 2018-07-12 VITALS
BODY MASS INDEX: 20.97 KG/M2 | SYSTOLIC BLOOD PRESSURE: 110 MMHG | DIASTOLIC BLOOD PRESSURE: 70 MMHG | WEIGHT: 126 LBS | HEART RATE: 77 BPM | OXYGEN SATURATION: 99 %

## 2018-07-12 DIAGNOSIS — Z86.59 HISTORY OF EATING DISORDER: ICD-10-CM

## 2018-07-12 DIAGNOSIS — Z13.31 POSITIVE DEPRESSION SCREENING: ICD-10-CM

## 2018-07-12 DIAGNOSIS — F32.A ANXIETY AND DEPRESSION: ICD-10-CM

## 2018-07-12 DIAGNOSIS — F32.2 SEVERE SINGLE CURRENT EPISODE OF MAJOR DEPRESSIVE DISORDER, WITHOUT PSYCHOTIC FEATURES (HCC): ICD-10-CM

## 2018-07-12 DIAGNOSIS — L75.0 BODY ODOR: ICD-10-CM

## 2018-07-12 DIAGNOSIS — R14.3 FLATULENCE: Primary | ICD-10-CM

## 2018-07-12 DIAGNOSIS — F41.9 ANXIETY AND DEPRESSION: ICD-10-CM

## 2018-07-12 PROCEDURE — G8431 POS CLIN DEPRES SCRN F/U DOC: HCPCS | Performed by: FAMILY MEDICINE

## 2018-07-12 PROCEDURE — 99214 OFFICE O/P EST MOD 30 MIN: CPT | Performed by: FAMILY MEDICINE

## 2018-07-12 RX ORDER — METRONIDAZOLE 500 MG/1
500 TABLET ORAL 2 TIMES DAILY
Qty: 14 TABLET | Refills: 0 | Status: SHIPPED | OUTPATIENT
Start: 2018-07-12 | End: 2018-07-22

## 2018-07-12 NOTE — PROGRESS NOTES
basis of positive PHQ-9 screening ( ), the following plan was implemented: await [facility] results, to see Dr Kelly Price. Patient will follow-up in 1 month(s) with PCP.

## 2018-07-13 ASSESSMENT — ENCOUNTER SYMPTOMS
CONSTIPATION: 0
ABDOMINAL PAIN: 1
BLOOD IN STOOL: 0
DIARRHEA: 0

## 2018-07-17 ENCOUNTER — TELEPHONE (OUTPATIENT)
Dept: FAMILY MEDICINE CLINIC | Age: 43
End: 2018-07-17

## 2018-07-21 ENCOUNTER — TELEPHONE (OUTPATIENT)
Dept: FAMILY MEDICINE CLINIC | Age: 43
End: 2018-07-21

## 2018-07-21 RX ORDER — DESVENLAFAXINE 50 MG/1
50 TABLET, EXTENDED RELEASE ORAL DAILY
Qty: 30 TABLET | Refills: 5 | Status: SHIPPED | OUTPATIENT
Start: 2018-07-21 | End: 2018-08-27

## 2018-07-23 ENCOUNTER — OFFICE VISIT (OUTPATIENT)
Dept: PSYCHOLOGY | Age: 43
End: 2018-07-23

## 2018-07-23 DIAGNOSIS — F33.2 MDD (MAJOR DEPRESSIVE DISORDER), RECURRENT SEVERE, WITHOUT PSYCHOSIS (HCC): Primary | ICD-10-CM

## 2018-07-23 PROCEDURE — 90791 PSYCH DIAGNOSTIC EVALUATION: CPT | Performed by: PSYCHOLOGIST

## 2018-07-23 NOTE — PROGRESS NOTES
Behavioral Health Consultation  Callie Donovan, Ph.D.  Psychologist  7/23/2018  10:04 AM      Time spent with Patient: 30 minutes  This is patient's third  [address] [facility] appointment. Reason for Consult:    Chief Complaint   Patient presents with    Depression    Anxiety     Referring Provider: Nidhi Tucker MD  [address] [facility], [address] [facility]    Feedback given to PCP. S:  Patient last seen in December of 2015. Reviewed limits to confidentiality. Patient continues to feel sad, depressed. \"I feel like I need to go somewhere and stay there to get help. \" Denies SI, but feels unmotivated to do anything, \"I don't want to live anymore. \" Feels unsupported by her , does not get along well with her mom, is not close with her sister. Has a close friend who is under a lot of stress now, so does not want to \"bring her down. \" Hides her emotions at home so that her children do not witness her being sad. \"I feel so ungrateful. \" Patient will  rx for Pristiq today. Often stops taking medications due to side effects: feeling tired, sweating. Discussed available levels of care. Patient interested in [address]. Referral placed.     O:  MSE:    Appearance    alert, cooperative, crying  Appetite normal  Sleep disturbance Yes  Fatigue Yes  Loss of pleasure Yes  Impulsive behavior No  Speech    spontaneous, normal rate and normal volume  Mood    Depressed   Affect    depressed affect  Thought Content    intrusive thoughts and all or nothing thinking  Thought Process    linear, goal directed and coherent  Associations    logical connections  Insight    Good  Judgment    Intact  Orientation    oriented to person, place, time, and general circumstances  Memory    recent and remote memory intact  Attention/Concentration    intact  Morbid ideation Yes  Suicide Assessment    no suicidal ideation    History:    Medications:   Current Outpatient Prescriptions   Medication Sig Dispense Refill    desvenlafaxine succinate (PRISTIQ) 50 clinician with whom they can meet regularly for psychotherapy services and Reviewed options for identifying appropriate providers.     Pt Behavioral Change Plan:   See Pt Instructions

## 2018-08-12 RX ORDER — RIZATRIPTAN BENZOATE 10 MG/1
TABLET, ORALLY DISINTEGRATING ORAL
Qty: 12 TABLET | Refills: 4 | Status: SHIPPED | OUTPATIENT
Start: 2018-08-12 | End: 2018-08-27 | Stop reason: SDUPTHER

## 2018-08-27 ENCOUNTER — OFFICE VISIT (OUTPATIENT)
Dept: FAMILY MEDICINE CLINIC | Age: 43
End: 2018-08-27

## 2018-08-27 VITALS
DIASTOLIC BLOOD PRESSURE: 70 MMHG | OXYGEN SATURATION: 99 % | SYSTOLIC BLOOD PRESSURE: 116 MMHG | HEART RATE: 80 BPM | WEIGHT: 118 LBS | BODY MASS INDEX: 19.64 KG/M2

## 2018-08-27 DIAGNOSIS — M79.7 FIBROMYALGIA: ICD-10-CM

## 2018-08-27 DIAGNOSIS — R14.3 FLATULENCE: ICD-10-CM

## 2018-08-27 DIAGNOSIS — B37.0 ORAL YEAST INFECTION: ICD-10-CM

## 2018-08-27 DIAGNOSIS — F33.2 MDD (MAJOR DEPRESSIVE DISORDER), RECURRENT SEVERE, WITHOUT PSYCHOSIS (HCC): Primary | ICD-10-CM

## 2018-08-27 DIAGNOSIS — G43.109 MIGRAINE WITH AURA AND WITHOUT STATUS MIGRAINOSUS, NOT INTRACTABLE: ICD-10-CM

## 2018-08-27 PROCEDURE — 99214 OFFICE O/P EST MOD 30 MIN: CPT | Performed by: FAMILY MEDICINE

## 2018-08-27 RX ORDER — FLUCONAZOLE 100 MG/1
TABLET ORAL
Qty: 5 TABLET | Refills: 0 | Status: SHIPPED | OUTPATIENT
Start: 2018-08-27 | End: 2018-10-26 | Stop reason: ALTCHOICE

## 2018-08-27 RX ORDER — RIZATRIPTAN BENZOATE 10 MG/1
TABLET, ORALLY DISINTEGRATING ORAL
Qty: 18 TABLET | Refills: 5 | Status: SHIPPED | OUTPATIENT
Start: 2018-08-27 | End: 2019-05-04 | Stop reason: SDUPTHER

## 2018-08-27 RX ORDER — DESVENLAFAXINE 100 MG/1
100 TABLET, EXTENDED RELEASE ORAL DAILY
Qty: 30 TABLET | Refills: 5 | Status: SHIPPED | OUTPATIENT
Start: 2018-08-27 | End: 2018-10-26

## 2018-08-27 NOTE — PROGRESS NOTES
medications,  and allergies  were all reviewed and updated as appropriate today. Review of Systems   Constitutional: Positive for appetite change and fatigue. Negative for activity change and fever. Gastrointestinal: Negative for abdominal pain and vomiting. Musculoskeletal: Positive for arthralgias, myalgias, neck pain and neck stiffness. Psychiatric/Behavioral: Positive for decreased concentration, dysphoric mood and sleep disturbance. Negative for agitation, self-injury and suicidal ideas. The patient is nervous/anxious. Physical Exam   Constitutional: She is oriented to person, place, and time. She appears well-developed and well-nourished. HENT:   Head: Normocephalic and atraumatic. Light grey film scattered over tongue   Eyes: Conjunctivae and EOM are normal. No scleral icterus. Pulmonary/Chest: Effort normal.   Musculoskeletal: Normal range of motion. Neurological: She is alert and oriented to person, place, and time. Skin: Skin is warm and dry. Psychiatric: Her behavior is normal. Judgment and thought content normal.   Depressed but not tearful         /70 (Site: Left Arm, Position: Sitting, Cuff Size: Medium Adult)   Pulse 80   Wt 118 lb (53.5 kg)   LMP 07/16/2018   SpO2 99%   BMI 19.64 kg/m²     Assessment/Plan:    Boris Jimenez was seen today for other. Diagnoses and all orders for this visit:    MDD (major depressive disorder), recurrent severe, without psychosis ([facility]  -     desvenlafaxine succinate (PRISTIQ) 100 MG TB24 extended release tablet; Take 1 tablet by mouth daily, increase from 50 mg    Migraine with aura and without status migrainosus, not intractable  -     rizatriptan (MAXALT-MLT) 10 MG disintegrating tablet; DISSOLVE ONE TABLET BY MOUTH AT ONSET OF HEADACHE; MAY REPEAT ONE TABLET IN 2 HOURS AS NEEDED, quantity increased from [facility] - Jenn Tao MD (NARGIS). Pt wonders about botox.     Oral yeast infection   -     fluconazole (DIFLUCAN) 100 MG tablet; 1 po qd x 5 days as she did not like Nystatin SS in past    Flatulence   Resolved with d/c of beans    Fibromyalgia   Agree with dx, as d/w pt

## 2018-08-28 ASSESSMENT — ENCOUNTER SYMPTOMS
VOMITING: 0
ABDOMINAL PAIN: 0

## 2018-09-13 ENCOUNTER — TELEPHONE (OUTPATIENT)
Dept: FAMILY MEDICINE CLINIC | Age: 43
End: 2018-09-13

## 2018-09-13 ENCOUNTER — NURSE ONLY (OUTPATIENT)
Dept: FAMILY MEDICINE CLINIC | Age: 43
End: 2018-09-13

## 2018-09-13 DIAGNOSIS — Z01.84 IMMUNITY TO HEPATITIS B VIRUS DEMONSTRATED BY SEROLOGIC TEST: Primary | ICD-10-CM

## 2018-09-13 LAB — HBV SURFACE AB TITR SER: 70.33 MIU/ML

## 2018-10-26 ENCOUNTER — HOSPITAL ENCOUNTER (OUTPATIENT)
Dept: GENERAL RADIOLOGY | Age: 43
Discharge: HOME | End: 2018-10-26
Payer: COMMERCIAL

## 2018-10-26 ENCOUNTER — OFFICE VISIT (OUTPATIENT)
Dept: FAMILY MEDICINE CLINIC | Age: 43
End: 2018-10-26
Payer: COMMERCIAL

## 2018-10-26 VITALS
WEIGHT: 122 LBS | DIASTOLIC BLOOD PRESSURE: 70 MMHG | BODY MASS INDEX: 20.3 KG/M2 | SYSTOLIC BLOOD PRESSURE: 120 MMHG | HEART RATE: 70 BPM | OXYGEN SATURATION: 70 %

## 2018-10-26 DIAGNOSIS — Z23 NEED FOR IMMUNIZATION AGAINST INFLUENZA: ICD-10-CM

## 2018-10-26 DIAGNOSIS — G89.29 CHRONIC NECK PAIN: ICD-10-CM

## 2018-10-26 DIAGNOSIS — M54.50 ACUTE BILATERAL LOW BACK PAIN WITHOUT SCIATICA: Primary | ICD-10-CM

## 2018-10-26 DIAGNOSIS — M54.50 ACUTE BILATERAL LOW BACK PAIN WITHOUT SCIATICA: ICD-10-CM

## 2018-10-26 DIAGNOSIS — G43.109 MIGRAINE WITH AURA AND WITHOUT STATUS MIGRAINOSUS, NOT INTRACTABLE: ICD-10-CM

## 2018-10-26 DIAGNOSIS — M54.2 CHRONIC NECK PAIN: ICD-10-CM

## 2018-10-26 PROCEDURE — 72100 X-RAY EXAM L-S SPINE 2/3 VWS: CPT

## 2018-10-26 PROCEDURE — 99213 OFFICE O/P EST LOW 20 MIN: CPT | Performed by: NURSE PRACTITIONER

## 2018-10-26 PROCEDURE — 90686 IIV4 VACC NO PRSV 0.5 ML IM: CPT | Performed by: NURSE PRACTITIONER

## 2018-10-26 PROCEDURE — 90471 IMMUNIZATION ADMIN: CPT | Performed by: NURSE PRACTITIONER

## 2018-10-26 RX ORDER — SUMATRIPTAN 5 MG/1
1 SPRAY NASAL DAILY PRN
Qty: 1 INHALER | Refills: 3 | Status: SHIPPED | OUTPATIENT
Start: 2018-10-26 | End: 2019-05-09 | Stop reason: CLARIF

## 2018-10-26 ASSESSMENT — ENCOUNTER SYMPTOMS
GASTROINTESTINAL NEGATIVE: 1
BACK PAIN: 1
RESPIRATORY NEGATIVE: 1

## 2018-10-26 NOTE — PROGRESS NOTES
10/26/2018     Libby Hinton (:  1975) is a 44 y.o. female, here for evaluation of the following medical concerns:    HPI  Patient presents today with back pain. She fell down the steps at home 4-6 weeks ago. She has a history of neck pain. She states her migraines are worsening. She states she is at least getting a bad headache daily. Patient states she has low back pain. She states she has a history of an old fracture on her back. She states the chiropractor wont touch her back. Some times the pain goes down her leg. She has seen physical medicine in the past and was not happy there. She had an appointment with neuro for her migraines but she canceled the appointment. Review of Systems   Constitutional: Negative. HENT: Negative. Respiratory: Negative. Cardiovascular: Negative. Gastrointestinal: Negative. Genitourinary: Negative. Musculoskeletal: Positive for back pain, myalgias and neck pain. Neurological: Positive for headaches. Negative for dizziness. Prior to Visit Medications    Medication Sig Taking? Authorizing Provider   SUMAtriptan (IMITREX) 5 MG/ACT nasal spray 1 spray by Nasal route daily as needed for Migraine Yes Donte Carvajal, APRN - CNP   rizatriptan (MAXALT-MLT) 10 MG disintegrating tablet DISSOLVE ONE TABLET BY MOUTH AT ONSET OF HEADACHE; MAY REPEAT ONE TABLET IN 2 HOURS AS NEEDED. Yes Vimal Larry MD   FAMCICLOVIR PO Take by mouth 2 times daily Yes Historical Provider, MD        Social History   Substance Use Topics    Smoking status: Never Smoker    Smokeless tobacco: Never Used    Alcohol use No        Vitals:    10/26/18 1633   BP: 120/70   Site: Left Upper Arm   Position: Sitting   Cuff Size: Medium Adult   Pulse: 70   SpO2: (!) 70%   Weight: 122 lb (55.3 kg)     Estimated body mass index is 20.3 kg/m² as calculated from the following:    Height as of 18: 5' 5\" (1.651 m).     Weight as of this encounter: 122 lb (55.3

## 2018-11-29 ENCOUNTER — OFFICE VISIT (OUTPATIENT)
Dept: ENT CLINIC | Age: 43
End: 2018-11-29
Payer: COMMERCIAL

## 2018-11-29 VITALS — HEART RATE: 76 BPM | SYSTOLIC BLOOD PRESSURE: 101 MMHG | DIASTOLIC BLOOD PRESSURE: 74 MMHG

## 2018-11-29 DIAGNOSIS — H72.92 PERFORATION OF TYMPANIC MEMBRANE, LEFT: Primary | ICD-10-CM

## 2018-11-29 PROCEDURE — 99213 OFFICE O/P EST LOW 20 MIN: CPT | Performed by: OTOLARYNGOLOGY

## 2018-12-05 ENCOUNTER — TELEPHONE (OUTPATIENT)
Dept: ENT CLINIC | Age: 43
End: 2018-12-05

## 2018-12-05 NOTE — PROGRESS NOTES
pulses    NEUROLOGIC:  Awake, alert, oriented to name, place and time. Cranial nerves II-XII are grossly intact. Motor is 5 out of 5 bilaterally. ASSESSMENT AND PLAN:    1. Patient is a 44 y.o. female cleared for the above specified procedure planned on 12/14/18 with Dr. Breanne Bruno at [facility].        Anand Evans M.D    [address] [place] [address]  [phone]    Mireille Mariee was seen today for pre-op exam, urinary tract infection and referral - general.    Diagnoses and all orders for this visit:    Preop examination    Urinary tract infection without hematuria, site unspecified  -     POCT Urinalysis no Micro  -     Urine Culture    Perforation of left tympanic membrane    Myofascial pain  -     [facility] PT - [facility]

## 2018-12-06 ENCOUNTER — OFFICE VISIT (OUTPATIENT)
Dept: FAMILY MEDICINE CLINIC | Age: 43
End: 2018-12-06
Payer: COMMERCIAL

## 2018-12-06 VITALS
OXYGEN SATURATION: 99 % | RESPIRATION RATE: 16 BRPM | BODY MASS INDEX: 20.3 KG/M2 | HEART RATE: 66 BPM | WEIGHT: 122 LBS | SYSTOLIC BLOOD PRESSURE: 90 MMHG | DIASTOLIC BLOOD PRESSURE: 72 MMHG

## 2018-12-06 DIAGNOSIS — H72.92 PERFORATION OF LEFT TYMPANIC MEMBRANE: ICD-10-CM

## 2018-12-06 DIAGNOSIS — Z01.818 PREOP EXAMINATION: Primary | ICD-10-CM

## 2018-12-06 DIAGNOSIS — N39.0 URINARY TRACT INFECTION WITHOUT HEMATURIA, SITE UNSPECIFIED: ICD-10-CM

## 2018-12-06 DIAGNOSIS — M79.18 MYOFASCIAL PAIN: ICD-10-CM

## 2018-12-06 LAB
BILIRUBIN, POC: NORMAL
BLOOD URINE, POC: NORMAL
CLARITY, POC: CLEAR
COLOR, POC: YELLOW
GLUCOSE URINE, POC: NORMAL
KETONES, POC: NORMAL
LEUKOCYTE EST, POC: NORMAL
NITRITE, POC: NORMAL
PH, POC: 6
PROTEIN, POC: NORMAL
SPECIFIC GRAVITY, POC: <=1.05
UROBILINOGEN, POC: NORMAL

## 2018-12-06 PROCEDURE — 99242 OFF/OP CONSLTJ NEW/EST SF 20: CPT | Performed by: FAMILY MEDICINE

## 2018-12-06 PROCEDURE — 81002 URINALYSIS NONAUTO W/O SCOPE: CPT | Performed by: FAMILY MEDICINE

## 2018-12-08 LAB — URINE CULTURE, ROUTINE: NORMAL

## 2018-12-12 ENCOUNTER — ANESTHESIA EVENT (OUTPATIENT)
Dept: OPERATING ROOM | Age: 43
End: 2018-12-12
Payer: COMMERCIAL

## 2018-12-12 RX ORDER — CALCIUM CARBONATE 500(1250)
500 TABLET ORAL DAILY
COMMUNITY
End: 2019-02-14

## 2018-12-12 NOTE — PROGRESS NOTES
overnight, you may bring a bag with personal items. Please have any large items you may need brought in by your family after your arrival to your hospital room. 15. If you have a Living Will or Durable Power of , please bring a copy on the day of your procedure. 15. With your permission, one family member may accompany you while you are being prepared for surgery. Once you are ready, additional family members may join you. HOW WE KEEP YOU SAFE and WORK TO PREVENT SURGICAL SITE INFECTIONS:  1. Health care workers should always check your ID bracelet to verify your name and birth date. You will be asked many times to state your name, date of birth, and allergies. 2. Health care workers should always clean their hands with soap or alcohol gel before providing care to you. It is okay to ask anyone if they cleaned their hands before they touch you. 3. You will be actively involved in verifying the type of procedure you are having and ensuring the correct surgical site. This will be confirmed multiple times prior to your procedure. Do NOT ru your surgery site UNLESS instructed to by your surgeon. 4. Do not shave or wax for 72 hours prior to procedure near your operative site. Shaving with a razor can irritate your skin and make it easier to develop an infection. On the day of your procedure, any hair that needs to be removed near the surgical site will be clipped by a healthcare worker using a special clippers designed to avoid skin irritation. 5. When you are in the operating room, your surgical site will be cleansed with a special soap, and in most cases, you will be given an antibiotic before the surgery begins. What to expect AFTER YOUR PROCEDURE:  1. Immediately following your procedure, your will be taken to the PACU for the first phase of your recovery.   Your nurse will help you recover from any potential side effects of anesthesia, such as extreme drowsiness, changes in your vital signs or breathing patterns. Nausea, headache, muscle aches, or sore throat may also occur after anesthesia. Your nurse will help you manage these potential side effects. 2. For comfort and safety, arrange to have someone at home with you for the first 24 hours after discharge. 3. You and your family will be given written instructions about your diet, activity, dressing care, medications, and return visits. 4. Once at home, should issues with nausea, pain, or bleeding occur, or should you notice any signs of infection, you should call your surgeon. 5. Always clean your hands before and after caring for your wound. Do not let your family touch your surgery site without cleaning their hands. 6. Narcotic pain medications can cause significant constipation. You may want to add a stool softener to your postoperative medication schedule or speak to your surgeon on how best to manage this SIDE EFFECT. SPECIAL INSTRUCTIONS     Thank you for allowing us to care for you. We strive to exceed your expectations in the delivery of care and service provided to you and your family. If you need to contact us for any reason, please call us at [phone]    Instructions reviewed with patient during preadmission testing phone interview. Jeremy Frances. 12/12/2018 .10:38 AM      ADDITIONAL EDUCATIONAL INFORMATION REVIEWED PER PHONE WITH YOU AND/OR YOUR FAMILY:  Yes Bring a urine sample on day of surgery  Yes Pain Goal-Taking Control of Your Pain  Yes FAQs about Surgical Site Infections  No Hibiclens® Bathing Instructions   Yes Antibacterial Soap  No Rishabh® Wipes Bathing Instructions (Obtained from: https://www.[employer]/. pdf )  No Incentive Spirometer Education  No Other

## 2018-12-14 ENCOUNTER — ANESTHESIA (OUTPATIENT)
Dept: OPERATING ROOM | Age: 43
End: 2018-12-14
Payer: COMMERCIAL

## 2018-12-14 ENCOUNTER — HOSPITAL ENCOUNTER (OUTPATIENT)
Age: 43
Setting detail: OUTPATIENT SURGERY
Discharge: HOME | End: 2018-12-14
Attending: OTOLARYNGOLOGY | Admitting: OTOLARYNGOLOGY
Payer: COMMERCIAL

## 2018-12-14 VITALS
HEART RATE: 60 BPM | BODY MASS INDEX: 20.33 KG/M2 | WEIGHT: 122 LBS | OXYGEN SATURATION: 100 % | SYSTOLIC BLOOD PRESSURE: 114 MMHG | RESPIRATION RATE: 16 BRPM | HEIGHT: 65 IN | DIASTOLIC BLOOD PRESSURE: 79 MMHG | TEMPERATURE: 97.1 F

## 2018-12-14 VITALS — OXYGEN SATURATION: 100 % | TEMPERATURE: 94.5 F | SYSTOLIC BLOOD PRESSURE: 100 MMHG | DIASTOLIC BLOOD PRESSURE: 55 MMHG

## 2018-12-14 DIAGNOSIS — H72.02 CENTRAL PERFORATION OF TYMPANIC MEMBRANE OF LEFT EAR: Primary | ICD-10-CM

## 2018-12-14 LAB — HCG QUALITATIVE: NEGATIVE

## 2018-12-14 PROCEDURE — 6360000002 HC RX W HCPCS: Performed by: ANESTHESIOLOGY

## 2018-12-14 PROCEDURE — 7100000011 HC PHASE II RECOVERY - ADDTL 15 MIN: Performed by: OTOLARYNGOLOGY

## 2018-12-14 PROCEDURE — 6360000002 HC RX W HCPCS: Performed by: NURSE ANESTHETIST, CERTIFIED REGISTERED

## 2018-12-14 PROCEDURE — 2709999900 HC NON-CHARGEABLE SUPPLY: Performed by: OTOLARYNGOLOGY

## 2018-12-14 PROCEDURE — 3600000012 HC SURGERY LEVEL 2 ADDTL 15MIN: Performed by: OTOLARYNGOLOGY

## 2018-12-14 PROCEDURE — 2580000003 HC RX 258: Performed by: ANESTHESIOLOGY

## 2018-12-14 PROCEDURE — 3700000000 HC ANESTHESIA ATTENDED CARE: Performed by: OTOLARYNGOLOGY

## 2018-12-14 PROCEDURE — 84703 CHORIONIC GONADOTROPIN ASSAY: CPT

## 2018-12-14 PROCEDURE — 2500000003 HC RX 250 WO HCPCS: Performed by: NURSE ANESTHETIST, CERTIFIED REGISTERED

## 2018-12-14 PROCEDURE — 2500000003 HC RX 250 WO HCPCS: Performed by: OTOLARYNGOLOGY

## 2018-12-14 PROCEDURE — 7100000010 HC PHASE II RECOVERY - FIRST 15 MIN: Performed by: OTOLARYNGOLOGY

## 2018-12-14 PROCEDURE — 3700000001 HC ADD 15 MINUTES (ANESTHESIA): Performed by: OTOLARYNGOLOGY

## 2018-12-14 PROCEDURE — 7100000001 HC PACU RECOVERY - ADDTL 15 MIN: Performed by: OTOLARYNGOLOGY

## 2018-12-14 PROCEDURE — 7100000000 HC PACU RECOVERY - FIRST 15 MIN: Performed by: OTOLARYNGOLOGY

## 2018-12-14 PROCEDURE — 3600000002 HC SURGERY LEVEL 2 BASE: Performed by: OTOLARYNGOLOGY

## 2018-12-14 PROCEDURE — 69631 REPAIR EARDRUM STRUCTURES: CPT | Performed by: OTOLARYNGOLOGY

## 2018-12-14 RX ORDER — LIDOCAINE HYDROCHLORIDE 20 MG/ML
INJECTION, SOLUTION INFILTRATION; PERINEURAL PRN
Status: DISCONTINUED | OUTPATIENT
Start: 2018-12-14 | End: 2018-12-14 | Stop reason: SDUPTHER

## 2018-12-14 RX ORDER — LIDOCAINE HYDROCHLORIDE AND EPINEPHRINE 10; 10 MG/ML; UG/ML
INJECTION, SOLUTION INFILTRATION; PERINEURAL PRN
Status: DISCONTINUED | OUTPATIENT
Start: 2018-12-14 | End: 2018-12-14 | Stop reason: HOSPADM

## 2018-12-14 RX ORDER — PROPOFOL 10 MG/ML
INJECTION, EMULSION INTRAVENOUS PRN
Status: DISCONTINUED | OUTPATIENT
Start: 2018-12-14 | End: 2018-12-14 | Stop reason: SDUPTHER

## 2018-12-14 RX ORDER — MIDAZOLAM HYDROCHLORIDE 1 MG/ML
INJECTION INTRAMUSCULAR; INTRAVENOUS PRN
Status: DISCONTINUED | OUTPATIENT
Start: 2018-12-14 | End: 2018-12-14 | Stop reason: SDUPTHER

## 2018-12-14 RX ORDER — GLYCOPYRROLATE 0.2 MG/ML
INJECTION INTRAMUSCULAR; INTRAVENOUS PRN
Status: DISCONTINUED | OUTPATIENT
Start: 2018-12-14 | End: 2018-12-14 | Stop reason: SDUPTHER

## 2018-12-14 RX ORDER — NEOSTIGMINE METHYLSULFATE 1 MG/ML
INJECTION, SOLUTION INTRAVENOUS PRN
Status: DISCONTINUED | OUTPATIENT
Start: 2018-12-14 | End: 2018-12-14 | Stop reason: SDUPTHER

## 2018-12-14 RX ORDER — ROCURONIUM BROMIDE 10 MG/ML
INJECTION, SOLUTION INTRAVENOUS PRN
Status: DISCONTINUED | OUTPATIENT
Start: 2018-12-14 | End: 2018-12-14 | Stop reason: SDUPTHER

## 2018-12-14 RX ORDER — HYDROMORPHONE HYDROCHLORIDE 2 MG/1
2 TABLET ORAL EVERY 4 HOURS PRN
Qty: 25 TABLET | Refills: 0 | Status: SHIPPED | OUTPATIENT
Start: 2018-12-14 | End: 2018-12-19

## 2018-12-14 RX ORDER — DEXAMETHASONE SODIUM PHOSPHATE 4 MG/ML
INJECTION, SOLUTION INTRA-ARTICULAR; INTRALESIONAL; INTRAMUSCULAR; INTRAVENOUS; SOFT TISSUE PRN
Status: DISCONTINUED | OUTPATIENT
Start: 2018-12-14 | End: 2018-12-14 | Stop reason: SDUPTHER

## 2018-12-14 RX ORDER — ONDANSETRON 2 MG/ML
4 INJECTION INTRAMUSCULAR; INTRAVENOUS
Status: COMPLETED | OUTPATIENT
Start: 2018-12-14 | End: 2018-12-14

## 2018-12-14 RX ORDER — SODIUM CHLORIDE, SODIUM LACTATE, POTASSIUM CHLORIDE, CALCIUM CHLORIDE 600; 310; 30; 20 MG/100ML; MG/100ML; MG/100ML; MG/100ML
INJECTION, SOLUTION INTRAVENOUS CONTINUOUS
Status: DISCONTINUED | OUTPATIENT
Start: 2018-12-14 | End: 2018-12-14 | Stop reason: HOSPADM

## 2018-12-14 RX ORDER — FENTANYL CITRATE 50 UG/ML
25 INJECTION, SOLUTION INTRAMUSCULAR; INTRAVENOUS EVERY 5 MIN PRN
Status: COMPLETED | OUTPATIENT
Start: 2018-12-14 | End: 2018-12-14

## 2018-12-14 RX ORDER — PROMETHAZINE HYDROCHLORIDE 25 MG/ML
6.25 INJECTION, SOLUTION INTRAMUSCULAR; INTRAVENOUS
Status: DISCONTINUED | OUTPATIENT
Start: 2018-12-14 | End: 2018-12-14 | Stop reason: HOSPADM

## 2018-12-14 RX ORDER — FENTANYL CITRATE 50 UG/ML
50 INJECTION, SOLUTION INTRAMUSCULAR; INTRAVENOUS EVERY 5 MIN PRN
Status: DISCONTINUED | OUTPATIENT
Start: 2018-12-14 | End: 2018-12-14 | Stop reason: HOSPADM

## 2018-12-14 RX ADMIN — GLYCOPYRROLATE 0.4 MG: 0.2 INJECTION INTRAMUSCULAR; INTRAVENOUS at 14:29

## 2018-12-14 RX ADMIN — FENTANYL CITRATE 25 MCG: 50 INJECTION INTRAMUSCULAR; INTRAVENOUS at 16:27

## 2018-12-14 RX ADMIN — ONDANSETRON 4 MG: 2 INJECTION INTRAMUSCULAR; INTRAVENOUS at 13:40

## 2018-12-14 RX ADMIN — FENTANYL CITRATE 25 MCG: 50 INJECTION INTRAMUSCULAR; INTRAVENOUS at 15:38

## 2018-12-14 RX ADMIN — SODIUM CHLORIDE, SODIUM LACTATE, POTASSIUM CHLORIDE, AND CALCIUM CHLORIDE: 600; 310; 30; 20 INJECTION, SOLUTION INTRAVENOUS at 11:47

## 2018-12-14 RX ADMIN — DEXAMETHASONE SODIUM PHOSPHATE 4 MG: 4 INJECTION, SOLUTION INTRAMUSCULAR; INTRAVENOUS at 13:40

## 2018-12-14 RX ADMIN — GLYCOPYRROLATE 0.2 MG: 0.2 INJECTION INTRAMUSCULAR; INTRAVENOUS at 13:12

## 2018-12-14 RX ADMIN — MIDAZOLAM HYDROCHLORIDE 2 MG: 1 INJECTION INTRAMUSCULAR; INTRAVENOUS at 13:12

## 2018-12-14 RX ADMIN — SODIUM CHLORIDE, SODIUM LACTATE, POTASSIUM CHLORIDE, AND CALCIUM CHLORIDE: 600; 310; 30; 20 INJECTION, SOLUTION INTRAVENOUS at 12:06

## 2018-12-14 RX ADMIN — Medication 3 MG: at 14:29

## 2018-12-14 RX ADMIN — SODIUM CHLORIDE, SODIUM LACTATE, POTASSIUM CHLORIDE, AND CALCIUM CHLORIDE: 600; 310; 30; 20 INJECTION, SOLUTION INTRAVENOUS at 13:45

## 2018-12-14 RX ADMIN — PROPOFOL 150 MG: 10 INJECTION, EMULSION INTRAVENOUS at 13:23

## 2018-12-14 RX ADMIN — FENTANYL CITRATE 25 MCG: 50 INJECTION INTRAMUSCULAR; INTRAVENOUS at 15:43

## 2018-12-14 RX ADMIN — ROCURONIUM BROMIDE 40 MG: 10 INJECTION, SOLUTION INTRAVENOUS at 13:23

## 2018-12-14 RX ADMIN — FENTANYL CITRATE 100 MCG: 50 INJECTION INTRAMUSCULAR; INTRAVENOUS at 13:20

## 2018-12-14 RX ADMIN — LIDOCAINE HYDROCHLORIDE 50 MG: 20 INJECTION, SOLUTION INFILTRATION; PERINEURAL at 13:20

## 2018-12-14 ASSESSMENT — PULMONARY FUNCTION TESTS
PIF_VALUE: 1
PIF_VALUE: 14
PIF_VALUE: 12
PIF_VALUE: 13
PIF_VALUE: 14
PIF_VALUE: 12
PIF_VALUE: 12
PIF_VALUE: 9
PIF_VALUE: 1
PIF_VALUE: 12
PIF_VALUE: 12
PIF_VALUE: 13
PIF_VALUE: 14
PIF_VALUE: 12
PIF_VALUE: 14
PIF_VALUE: 14
PIF_VALUE: 13
PIF_VALUE: 4
PIF_VALUE: 14
PIF_VALUE: 12
PIF_VALUE: 13
PIF_VALUE: 15
PIF_VALUE: 15
PIF_VALUE: 14
PIF_VALUE: 15
PIF_VALUE: 13
PIF_VALUE: 12
PIF_VALUE: 0
PIF_VALUE: 13
PIF_VALUE: 13
PIF_VALUE: 14
PIF_VALUE: 15
PIF_VALUE: 12
PIF_VALUE: 13
PIF_VALUE: 14
PIF_VALUE: 13
PIF_VALUE: 14
PIF_VALUE: 9
PIF_VALUE: 13
PIF_VALUE: 13
PIF_VALUE: 16
PIF_VALUE: 12
PIF_VALUE: 12
PIF_VALUE: 2
PIF_VALUE: 13
PIF_VALUE: 10
PIF_VALUE: 14
PIF_VALUE: 13
PIF_VALUE: 14
PIF_VALUE: 13
PIF_VALUE: 0
PIF_VALUE: 13
PIF_VALUE: 14
PIF_VALUE: 15
PIF_VALUE: 1
PIF_VALUE: 13
PIF_VALUE: 14
PIF_VALUE: 14
PIF_VALUE: 13
PIF_VALUE: 14
PIF_VALUE: 12
PIF_VALUE: 15
PIF_VALUE: 14
PIF_VALUE: 14
PIF_VALUE: 13
PIF_VALUE: 15
PIF_VALUE: 15
PIF_VALUE: 13
PIF_VALUE: 0
PIF_VALUE: 1
PIF_VALUE: 12
PIF_VALUE: 2
PIF_VALUE: 1
PIF_VALUE: 3
PIF_VALUE: 12
PIF_VALUE: 14
PIF_VALUE: 13
PIF_VALUE: 12
PIF_VALUE: 14
PIF_VALUE: 0
PIF_VALUE: 3
PIF_VALUE: 12

## 2018-12-14 ASSESSMENT — PAIN DESCRIPTION - ORIENTATION: ORIENTATION: LEFT

## 2018-12-14 ASSESSMENT — PAIN SCALES - GENERAL
PAINLEVEL_OUTOF10: 6
PAINLEVEL_OUTOF10: 4
PAINLEVEL_OUTOF10: 0
PAINLEVEL_OUTOF10: 5
PAINLEVEL_OUTOF10: 0
PAINLEVEL_OUTOF10: 6

## 2018-12-14 ASSESSMENT — PAIN DESCRIPTION - FREQUENCY: FREQUENCY: CONTINUOUS

## 2018-12-14 ASSESSMENT — PAIN DESCRIPTION - PROGRESSION: CLINICAL_PROGRESSION: GRADUALLY IMPROVING

## 2018-12-14 ASSESSMENT — LIFESTYLE VARIABLES: SMOKING_STATUS: 0

## 2018-12-14 ASSESSMENT — PAIN DESCRIPTION - LOCATION: LOCATION: EAR

## 2018-12-14 ASSESSMENT — PAIN DESCRIPTION - DESCRIPTORS: DESCRIPTORS: ACHING

## 2018-12-14 ASSESSMENT — PAIN - FUNCTIONAL ASSESSMENT: PAIN_FUNCTIONAL_ASSESSMENT: 0-10

## 2018-12-14 ASSESSMENT — PAIN DESCRIPTION - PAIN TYPE: TYPE: SURGICAL PAIN

## 2018-12-14 NOTE — BRIEF OP NOTE
Brief Postoperative Note  ______________________________________________________________    Patient: Dayo Knutson  YOB: 1975  MRN: [id number]  Date of Procedure: 12/14/2018    Pre-Op Diagnosis: perforation left typanic membrane    Post-Op Diagnosis: Same       Procedure(s):  POST-AURICULAR TYMPANOPLASTY WITH LATERAL PLACEMENT OF TEMPORALIS FASCIA GRAFT    Anesthesia: General    Surgeon(s):   Franki Story MD      Estimated Blood Loss (mL): less than 50     Complications: None    Specimens:   * No specimens in log *    Implants:  * No implants in log *      Drains:        Franki Story MD  Date: 12/14/2018  Time: 2:37 PM

## 2018-12-14 NOTE — ANESTHESIA PRE PROCEDURE
aura and without status migrainosus, not intractable G43.109       Past Medical History:        Diagnosis Date    Allergy history unknown     Anxiety     Bulimia     Dental crowns present     molar    Depression     Has been dx'd with bipolar 2, ocd, personality d/o as well. Unclear what dx is correct.  Fibromyalgia     Migraine     b/l    Perforation of tympanic membrane     Dr Arcelia Foley, multipe perforations       Past Surgical History:        Procedure Laterality Date    BREAST IMPLANT REMOVAL  01/2014    adjustment  to implants    BREAST SURGERY  2013    augmentation    DILATION AND CURETTAGE OF UTERUS      INNER EAR SURGERY Right     mastoidectomy[age], had cholesteocytoma    MASTOIDECTOMY      TONSILLECTOMY      TYMPANIC MEMBRANE REPAIR Right     TYMPANOPLASTY Left 12/15/2017    LEFT TYMPANOPLASTY                           Social History:    Social History   Substance Use Topics    Smoking status: Never Smoker    Smokeless tobacco: Never Used    Alcohol use No                                Counseling given: Not Answered      Vital Signs (Current):   Vitals:    12/12/18 1050 12/14/18 1108   BP:  103/69   Pulse:  72   Resp:  16   Temp:  98.5 °F (36.9 °C)   TempSrc:  Oral   SpO2:  100%   Weight: 122 lb (55.3 kg) 122 lb (55.3 kg)   Height: 5' 5\" (1.651 m) 5' 5\" (1.651 m)                                              BP Readings from Last 3 Encounters:   12/14/18 103/69   12/06/18 90/72   11/29/18 101/74       NPO Status:ate a protien bar at 4 am                                                                                 BMI:   Wt Readings from Last 3 Encounters:   12/14/18 122 lb (55.3 kg)   12/06/18 122 lb (55.3 kg)   10/26/18 122 lb (55.3 kg)     Body mass index is 20.3 kg/m².     CBC:   Lab Results   Component Value Date    WBC 4.0 08/16/2017    RBC 4.58 08/16/2017    HGB 14.2 08/16/2017    HCT 42.5 08/16/2017    MCV 92.9 08/16/2017    RDW 14.0 08/16/2017     08/16/2017 CMP:   Lab Results   Component Value Date     02/09/2015    K 4.9 02/09/2015     02/09/2015    CO2 24 02/09/2015    BUN 13 02/09/2015    CREATININE 0.7 02/09/2015    GFRAA >60 02/09/2015    GFRAA >60 06/04/2010    AGRATIO 1.9 02/09/2015    LABGLOM >60 02/09/2015    GLUCOSE 80 02/09/2015    PROT 7.3 02/09/2015    PROT 7.6 06/04/2010    CALCIUM 10.1 02/09/2015    BILITOT 1.4 02/09/2015    BILITOT 0.6 02/09/2015    ALKPHOS 42 02/09/2015    AST 21 02/09/2015    ALT 17 02/09/2015       POC Tests: No results for input(s): POCGLU, POCNA, POCK, POCCL, POCBUN, POCHEMO, POCHCT in the last 72 hours. Coags:   Lab Results   Component Value Date    PROTIME 11.4 04/18/2016    INR 1.00 04/18/2016    APTT 36.1 04/18/2016       HCG (If Applicable):   Lab Results   Component Value Date    PREGTESTUR Negative 12/15/2017        ABGs: No results found for: PHART, PO2ART, UWO7TGD, FNC4FEL, BEART, K4ZULEDJ     Type & Screen (If Applicable):  No results found for: LABABO, [address]    Anesthesia Evaluation  Patient summary reviewed and Nursing notes reviewed no history of anesthetic complications:   Airway: Mallampati: II  TM distance: >3 FB   Neck ROM: full  Mouth opening: > = 3 FB Dental: normal exam         Pulmonary:normal exam  breath sounds clear to auscultation      (-) sleep apnea and not a current smoker                          ROS comment: Central perforation of tympanic membrane of left ear   Cardiovascular:Negative CV ROS            Rhythm: regular  Rate: normal                    Neuro/Psych:   (+) psychiatric history:depression/anxiety              ROS comment: Bulimia nervosa, purging type  Fibromyalgia  Has been dx'd with bipolar 2, ocd, personality d/o as well. Unclear what dx is correct. GI/Hepatic/Renal: Neg GI/Hepatic/Renal ROS       (-) hiatal hernia and GERD       Endo/Other: Negative Endo/Other ROS                    Abdominal:           Vascular: negative vascular ROS.

## 2018-12-14 NOTE — H&P
by Nasal route daily as needed for Migraine 10/26/18  Yes LOR Pollack - CNP   rizatriptan (MAXALT-MLT) 10 MG disintegrating tablet DISSOLVE ONE TABLET BY MOUTH AT ONSET OF HEADACHE; MAY REPEAT ONE TABLET IN 2 HOURS AS NEEDED. 8/27/18  Yes Ronda Jordan MD   FAMCICLOVIR PO Take by mouth 2 times daily   Yes Historical Provider, MD         Allergies:  Codeine; Flagyl [metronidazole]; Ultram [tramadol hcl]; Celebrex [celecoxib]; Darvocet [propoxyphene n-acetaminophen]; Percocet [oxycodone-acetaminophen]; and Vicodin [hydrocodone-acetaminophen]    PHYSICAL EXAM:      /69   Pulse 72   Temp 98.5 °F (36.9 °C) (Oral)   Resp 16   Ht 5' 5\" (1.651 m)   Wt 122 lb (55.3 kg)   LMP 11/17/2018 (Approximate)   SpO2 100%   BMI 20.30 kg/m²       Heart:  regular rate and rhythm    Lungs:  No increased work of breathing, good air exchange, clear to auscultation bilaterally, no crackles or wheezing    Abdomen:  soft, non-distended, non-tender, no rebound tenderness or guarding and normal active bowel sounds    ASSESSMENT AND PLAN:    1. Patient seen and focused exam done today- no new changes since last physical exam on 12/6/18    2. Access to ancillary services are available per request of the provider.     LOR Strauss CNP     12/14/2018

## 2018-12-15 NOTE — ANESTHESIA POSTPROCEDURE EVALUATION
Department of Anesthesiology  Postprocedure Note    Patient: Neil Sands  MRN: [id number]  YOB: 1975  Date of evaluation: 12/14/2018  Time:  8:13 PM     Procedure Summary     Date:  12/14/18 Room / Location:  [facility] OR [address] [place] [address] OR    Anesthesia Start:  1311 Anesthesia Stop:  8111    Procedure:  POST-AURICULAR TYMPANOPLASTY WITH LATERAL PLACEMENT OF TEMPORALIS FASCIA GRAFT (Left Ear) Diagnosis:  (perforation left typanic membrane)    Surgeon: Drake Pate MD Responsible Provider:  Doretha German MD    Anesthesia Type:  general ASA Status:  2          Anesthesia Type: general    Tracy Phase I: Tracy Score: 10    Tracy Phase II: Tracy Score: 10    Last vitals: Reviewed and per EMR flowsheets. Anesthesia Post Evaluation    Patient location during evaluation: PACU  Patient participation: complete - patient participated  Level of consciousness: awake and alert  Pain scale: please refer to nursing notes. Airway patency: patent  Nausea & Vomiting: no nausea and no vomiting  Complications: no  Cardiovascular status: hemodynamically stable  Respiratory status: spontaneous ventilation  Hydration status: stable  Comments: No phone calls received from PACU RN regarding patient.

## 2018-12-15 NOTE — OP NOTE
and the middle ear. The skin of the tympanic membrane  was elevated from the tympanic membrane remnant and removed. The  tympanic annulus was elevated and the ossicular chain was evaluated. The ossicular chain was intact and mobile. The previously harvested  temporalis fascia graft was placed lateral to the tympanic membrane,  anterior and posterior skin grafts were placed lateral to that graft. Gelfoam was used pack the graft in place. The postauricular incision  was closed in three layers. The two deep layers were closed with a 3-0  Vicryl and the skin was closed with a continuous 5-0 nylon. A cotton  ball was placed in the external auditory meatus, a Cintia dressing  was put in place. The patient tolerated the procedure well, and was  transferred to the recovery room in good condition. Facial nerve  function was intact postoperatively.         Saba Ware MD    D: 12/14/2018 14:48:35       T: 12/14/2018 21:33:46     [email or web address]  Job#: [id number]     Doc#: [id number]    CC:

## 2018-12-19 ENCOUNTER — TELEPHONE (OUTPATIENT)
Dept: ENT CLINIC | Age: 43
End: 2018-12-19

## 2018-12-20 ENCOUNTER — OFFICE VISIT (OUTPATIENT)
Dept: ENT CLINIC | Age: 43
End: 2018-12-20

## 2018-12-20 DIAGNOSIS — Z98.890 STATUS POST EAR SURGERY: Primary | ICD-10-CM

## 2018-12-20 PROCEDURE — 99024 POSTOP FOLLOW-UP VISIT: CPT | Performed by: OTOLARYNGOLOGY

## 2018-12-31 ENCOUNTER — OFFICE VISIT (OUTPATIENT)
Dept: ENT CLINIC | Age: 43
End: 2018-12-31

## 2018-12-31 DIAGNOSIS — Z98.890 STATUS POST EAR SURGERY: Primary | ICD-10-CM

## 2018-12-31 PROCEDURE — 99024 POSTOP FOLLOW-UP VISIT: CPT | Performed by: OTOLARYNGOLOGY

## 2018-12-31 RX ORDER — CIPROFLOXACIN 500 MG/1
500 TABLET, FILM COATED ORAL 2 TIMES DAILY
Qty: 14 TABLET | Refills: 0 | Status: SHIPPED | OUTPATIENT
Start: 2018-12-31 | End: 2019-01-07

## 2019-01-07 ENCOUNTER — OFFICE VISIT (OUTPATIENT)
Dept: ENT CLINIC | Age: 44
End: 2019-01-07

## 2019-01-07 DIAGNOSIS — Z98.890 STATUS POST EAR SURGERY: Primary | ICD-10-CM

## 2019-01-07 PROCEDURE — 99024 POSTOP FOLLOW-UP VISIT: CPT | Performed by: OTOLARYNGOLOGY

## 2019-01-18 ENCOUNTER — OFFICE VISIT (OUTPATIENT)
Dept: ENT CLINIC | Age: 44
End: 2019-01-18

## 2019-01-18 DIAGNOSIS — H72.92 PERFORATION OF LEFT TYMPANIC MEMBRANE: Primary | ICD-10-CM

## 2019-01-18 DIAGNOSIS — H66.92 LEFT ACUTE OTITIS MEDIA: ICD-10-CM

## 2019-01-18 PROCEDURE — 99024 POSTOP FOLLOW-UP VISIT: CPT | Performed by: OTOLARYNGOLOGY

## 2019-01-18 RX ORDER — CIPROFLOXACIN AND DEXAMETHASONE 3; 1 MG/ML; MG/ML
4 SUSPENSION/ DROPS AURICULAR (OTIC) 2 TIMES DAILY
Qty: 1 BOTTLE | Refills: 1 | Status: SHIPPED | OUTPATIENT
Start: 2019-01-18 | End: 2019-07-03 | Stop reason: SDUPTHER

## 2019-01-31 ENCOUNTER — OFFICE VISIT (OUTPATIENT)
Dept: ENT CLINIC | Age: 44
End: 2019-01-31

## 2019-01-31 DIAGNOSIS — Z98.890 STATUS POST EAR SURGERY: Primary | ICD-10-CM

## 2019-01-31 PROCEDURE — 99024 POSTOP FOLLOW-UP VISIT: CPT | Performed by: OTOLARYNGOLOGY

## 2019-02-14 ENCOUNTER — OFFICE VISIT (OUTPATIENT)
Dept: FAMILY MEDICINE CLINIC | Age: 44
End: 2019-02-14
Payer: COMMERCIAL

## 2019-02-14 VITALS
OXYGEN SATURATION: 98 % | BODY MASS INDEX: 19.97 KG/M2 | DIASTOLIC BLOOD PRESSURE: 62 MMHG | HEART RATE: 62 BPM | RESPIRATION RATE: 16 BRPM | WEIGHT: 120 LBS | SYSTOLIC BLOOD PRESSURE: 98 MMHG

## 2019-02-14 DIAGNOSIS — F33.2 MDD (MAJOR DEPRESSIVE DISORDER), RECURRENT SEVERE, WITHOUT PSYCHOSIS (HCC): Primary | ICD-10-CM

## 2019-02-14 DIAGNOSIS — M54.2 NECK PAIN: ICD-10-CM

## 2019-02-14 DIAGNOSIS — Z12.39 BREAST CANCER SCREENING: ICD-10-CM

## 2019-02-14 DIAGNOSIS — M54.50 LUMBAR PAIN: ICD-10-CM

## 2019-02-14 PROCEDURE — 99214 OFFICE O/P EST MOD 30 MIN: CPT | Performed by: FAMILY MEDICINE

## 2019-02-14 RX ORDER — DESVENLAFAXINE 50 MG/1
50 TABLET, EXTENDED RELEASE ORAL DAILY
Qty: 30 TABLET | Refills: 5 | Status: SHIPPED | OUTPATIENT
Start: 2019-02-14 | End: 2019-05-09

## 2019-02-14 RX ORDER — PREGABALIN 50 MG/1
CAPSULE ORAL
Qty: 60 CAPSULE | Refills: 2 | Status: SHIPPED | OUTPATIENT
Start: 2019-02-14 | End: 2019-05-09

## 2019-03-05 ENCOUNTER — HOSPITAL ENCOUNTER (OUTPATIENT)
Dept: MAMMOGRAPHY | Age: 44
Discharge: HOME | End: 2019-03-10
Payer: COMMERCIAL

## 2019-03-05 DIAGNOSIS — Z12.31 VISIT FOR SCREENING MAMMOGRAM: ICD-10-CM

## 2019-03-05 PROCEDURE — 77067 SCR MAMMO BI INCL CAD: CPT

## 2019-03-29 ENCOUNTER — TELEPHONE (OUTPATIENT)
Dept: FAMILY MEDICINE CLINIC | Age: 44
End: 2019-03-29

## 2019-03-29 DIAGNOSIS — M54.2 NECK PAIN: Primary | ICD-10-CM

## 2019-03-29 DIAGNOSIS — M54.50 LUMBAR PAIN: ICD-10-CM

## 2019-04-01 NOTE — TELEPHONE ENCOUNTER
Pls tell her insur co's require PT prior to coverage of an mri. Their hope is that pain will improve with PT (which would be allison) and then an mri isn't needed. Can we put a referral in for her?

## 2019-04-10 ENCOUNTER — TELEPHONE (OUTPATIENT)
Dept: FAMILY MEDICINE CLINIC | Age: 44
End: 2019-04-10

## 2019-04-10 DIAGNOSIS — M54.50 LUMBAR PAIN: ICD-10-CM

## 2019-04-10 DIAGNOSIS — M54.2 NECK PAIN: Primary | ICD-10-CM

## 2019-04-24 ENCOUNTER — HOSPITAL ENCOUNTER (OUTPATIENT)
Dept: PHYSICAL THERAPY | Age: 44
Setting detail: THERAPIES SERIES
Discharge: HOME | End: 2019-04-24
Payer: COMMERCIAL

## 2019-04-24 PROCEDURE — 97162 PT EVAL MOD COMPLEX 30 MIN: CPT

## 2019-04-24 PROCEDURE — 97140 MANUAL THERAPY 1/> REGIONS: CPT

## 2019-04-24 ASSESSMENT — PAIN SCALES - GENERAL: PAINLEVEL_OUTOF10: 6

## 2019-04-24 ASSESSMENT — PAIN DESCRIPTION - PAIN TYPE: TYPE: CHRONIC PAIN

## 2019-04-24 ASSESSMENT — PAIN DESCRIPTION - LOCATION: LOCATION: BACK;NECK

## 2019-04-24 NOTE — FLOWSHEET NOTE
Physical Therapy Daily Treatment Note    Date:  2019    Patient Name:  Hiral Padron    :  1975  MRN: [id number]  Restrictions/Precautions:    Medical/Treatment Diagnosis Information:  · Diagnosis: neck pain, lumbar pain  Insurance/Certification information:  PT Insurance Information: [address]  Physician Information:  Referring Practitioner: Heraclio Plaza MD  Plan of care signed (Y/N):  N  Visit# / total visits:       G-Code (if applicable):         PT G-Codes  Functional Assessment Tool Used: Modified Oswestry  Score: 26 (52%)    Time in:   8:00      Timed Treatment: 15 Total Treatment Time: 60  ________________________________________________________________________________________    Pain Level:    /10  SUBJECTIVE:  See eval    OBJECTIVE:     Exercise/Equipment Resistance/Repetitions Other comments          TA set nv    bridge nv    multif hip lift nv                                                                                                                  Other Therapeutic Activities:      Manual Treatments:    L gary with cavitation; side-lying L sacral MET correction; prone SIJD distraction mobilization grade V    Modalities:      Test/Measurements:  See eval       ASSESSMENT:    See eval     Treatment/Activity Tolerance:   X Patient tolerated treatment well ? Patient limited by fatique  ? Patient limited by pain ? Patient limited by other medical complications  ? Other:     Goals:    Short term goals  Time Frame for Short term goals: 4 weeks  Short term goal 1: pt will be indep in HEP  Short term goal 2: pt will decrease pain 25%  Short term goal 3: pt will increase B hip strength 4/5  Short term goal 4: pt will return to clinic with symmetrical pelvic alignment  Short term goal 5: pt will return to caring for her children without limitations           Plan: x Continue per plan of care ? Alter current plan (see comments)   x Plan of care initiated ? Hold pending MD visit ? Discharge      Plan for Next Session:      Re-Certification Due Date:         Signature:  Halle Gill

## 2019-04-24 NOTE — PROGRESS NOTES
Physical Therapy  Initial Assessment  Date: 2019  Patient Name: Rochelle Madison  MRN: [id number]  : 1975     Treatment Diagnosis: M54.5, M54.2    Restrictions  Position Activity Restriction  Other position/activity restrictions: none    Subjective   General  Chart Reviewed: Yes  Patient assessed for rehabilitation services?: Yes  Referring Practitioner: Roxanna Valdovinos MD  Referral Date : 19  Diagnosis: neck pain, lumbar pain  General Comment  Comments: PLOF: indep in ADLs, caring for children, exercising, crafting  PT Visit Information  Onset Date: 10/24/18  PT Insurance Information: David  Subjective  Subjective: Pt reports that she fell down the stairs a couple months ago. Notes that she already had back problems prior to this but this made her back worse. Pain sometimes radiates to front of hips but not often. BLE N&T to feet: usually when driving or sitting. Pain is relieved with PPT. Lately, she has been unable to walk her dog or carry anything in front of her. She has been to the chiropractor \"but they would not crack my back due to an old fracture. \" Pt also reports that she has chronic neck pain and fibromyalgia. She is unable to stand to do her hair or brush her teeth. Pt reports that she did have an XR and was sent to a specialist but the specialist would not see her without an MRI. Was walking 13-16,000 steps per day and running 10 miles frequently, but now she can barely get to 6,000. Trying to work out at home puts her in a lot of pain. Pt has trouble sleeping due to her neck pain.    Pain Screening  Patient Currently in Pain: Yes  Pain Assessment  Pain Assessment: 0-10  Pain Level: 6  Pain Type: Chronic pain  Pain Location: Back;Neck  Vital Signs  Patient Currently in Pain: Yes    Objective     Observation/Palpation  Posture: Fair  Palpation: Standing - L iliac crest elevated, L ASIS elevated, L PSIS elevated; Supine - LLE longer, L ASIS elevated; Prone - L PSIS elevated, (+) L will increase B hip strength 4/5  Short term goal 4: pt will return to clinic with symmetrical pelvic alignment  Short term goal 5: pt will return to caring for her children without limitations       Therapy Time   Individual Concurrent Group Co-treatment   Time In 0800         Time Out 0900         Minutes 60         Timed Code Treatment Minutes: Je Chester, [address] [place] [address] [address]

## 2019-04-29 ENCOUNTER — HOSPITAL ENCOUNTER (OUTPATIENT)
Dept: PHYSICAL THERAPY | Age: 44
Setting detail: THERAPIES SERIES
Discharge: HOME | End: 2019-04-29
Payer: COMMERCIAL

## 2019-04-29 PROCEDURE — 97140 MANUAL THERAPY 1/> REGIONS: CPT

## 2019-04-29 PROCEDURE — 97110 THERAPEUTIC EXERCISES: CPT

## 2019-04-29 NOTE — FLOWSHEET NOTE
Physical Therapy Daily Treatment Note    Date:  2019    Patient Name:  Niko Brewer  \"UIRVN\"  :  1975  MRN: [id number]  Restrictions/Precautions: universal  Medical/Treatment Diagnosis Information:  · Diagnosis: neck pain, lumbar pain  Insurance/Certification information:  PT Insurance Information: [address]  Physician Information:  Referring Practitioner: Xochitl Rodriguez MD  Plan of care signed (Y/N):  N  Visit# / total visits:       G-Code (if applicable):         PT G-Codes  Functional Assessment Tool Used: Modified Oswestry  Score: 26 (52%)    Time in:   1:30pm      Timed Treatment: 30min. Total Treatment Time: 30min.  _____________________________________________________________________________________    Pain Level:    8/10  SUBJECTIVE:  Patient reports \"I have a headache, I think I am just stressed because my daughter got  yesterday\". OBJECTIVE:     Exercise/Equipment Resistance/Repetitions Other comments   TG + MH   x5min    TA set until achieved   [id number]  KFO: x10    bridge 2x10    multif hip lift x10B    Clamshell   x6B                           Other Therapeutic Activities:  --    Manual Treatments:    L gary with cavitation; side-lying L sacral MET correction;     Modalities:  --    Test/Measurements:  See eval       ASSESSMENT:   The patient performed above TE well with no increase in pain. The patient responded well to manual and added TE this date. The patient reported at end of session \"I do feel a little better\". Gave patient updated HEP. Treatment/Activity Tolerance:   X Patient tolerated treatment well   ? Patient limited by fatique  ? Patient limited by pain ? Patient limited by other medical complications  ?  Other:       Goals:    Short term goals  Time Frame for Short term goals: 4 weeks  Short term goal 1: pt will be indep in HEP  Short term goal 2: pt will decrease pain 25%  Short term goal 3: pt will increase B hip strength 4/5  Short term goal 4: pt will return to clinic with symmetrical pelvic alignment  Short term goal 5: pt will return to caring for her children without limitations           Plan: x Continue per plan of care ? Alter current plan (see comments)   ? Plan of care initiated  ? Hold pending MD visit ?  Discharge      Plan for Next Session:      Re-Certification Due Date:         Signature:  Yulia Hanson PT, DPT

## 2019-05-01 ENCOUNTER — APPOINTMENT (OUTPATIENT)
Dept: PHYSICAL THERAPY | Age: 44
End: 2019-05-01
Payer: COMMERCIAL

## 2019-05-04 DIAGNOSIS — G43.109 MIGRAINE WITH AURA AND WITHOUT STATUS MIGRAINOSUS, NOT INTRACTABLE: ICD-10-CM

## 2019-05-06 RX ORDER — RIZATRIPTAN BENZOATE 10 MG/1
TABLET, ORALLY DISINTEGRATING ORAL
Qty: 12 TABLET | Refills: 4 | Status: SHIPPED | OUTPATIENT
Start: 2019-05-06 | End: 2020-05-27 | Stop reason: SDUPTHER

## 2019-05-08 ENCOUNTER — APPOINTMENT (OUTPATIENT)
Dept: PHYSICAL THERAPY | Age: 44
End: 2019-05-08
Payer: COMMERCIAL

## 2019-05-09 ENCOUNTER — HOSPITAL ENCOUNTER (OUTPATIENT)
Dept: PHYSICAL THERAPY | Age: 44
Setting detail: THERAPIES SERIES
Discharge: HOME | End: 2019-05-09
Payer: COMMERCIAL

## 2019-05-09 ENCOUNTER — OFFICE VISIT (OUTPATIENT)
Dept: FAMILY MEDICINE CLINIC | Age: 44
End: 2019-05-09
Payer: COMMERCIAL

## 2019-05-09 VITALS
DIASTOLIC BLOOD PRESSURE: 58 MMHG | WEIGHT: 125 LBS | TEMPERATURE: 99 F | SYSTOLIC BLOOD PRESSURE: 100 MMHG | BODY MASS INDEX: 20.8 KG/M2 | HEART RATE: 85 BPM | OXYGEN SATURATION: 99 %

## 2019-05-09 DIAGNOSIS — R30.0 DYSURIA: Primary | ICD-10-CM

## 2019-05-09 DIAGNOSIS — H66.014 RECURRENT ACUTE SUPPURATIVE OTITIS MEDIA OF RIGHT EAR WITH SPONTANEOUS RUPTURE OF TYMPANIC MEMBRANE: ICD-10-CM

## 2019-05-09 LAB
BILIRUBIN, POC: NORMAL
BLOOD URINE, POC: NORMAL
CLARITY, POC: CLEAR
COLOR, POC: YELLOW
GLUCOSE URINE, POC: NORMAL
KETONES, POC: NORMAL
LEUKOCYTE EST, POC: NORMAL
NITRITE, POC: NORMAL
PH, POC: 7.5
PROTEIN, POC: NORMAL
SPECIFIC GRAVITY, POC: 1.01
UROBILINOGEN, POC: NORMAL

## 2019-05-09 PROCEDURE — 97140 MANUAL THERAPY 1/> REGIONS: CPT

## 2019-05-09 PROCEDURE — 97110 THERAPEUTIC EXERCISES: CPT

## 2019-05-09 PROCEDURE — 81002 URINALYSIS NONAUTO W/O SCOPE: CPT | Performed by: NURSE PRACTITIONER

## 2019-05-09 PROCEDURE — 99214 OFFICE O/P EST MOD 30 MIN: CPT | Performed by: NURSE PRACTITIONER

## 2019-05-09 RX ORDER — AMOXICILLIN AND CLAVULANATE POTASSIUM 875; 125 MG/1; MG/1
1 TABLET, FILM COATED ORAL 2 TIMES DAILY
Qty: 20 TABLET | Refills: 0 | Status: SHIPPED | OUTPATIENT
Start: 2019-05-09 | End: 2019-05-19

## 2019-05-09 ASSESSMENT — ENCOUNTER SYMPTOMS
RHINORRHEA: 0
SINUS PAIN: 0
SINUS PRESSURE: 0

## 2019-05-09 NOTE — FLOWSHEET NOTE
Physical Therapy Daily Treatment Note    Date:  2019    Patient Name:  Cora Coleman  \"TABATHA\"  :  1975  MRN: [id number]  Restrictions/Precautions: universal  Medical/Treatment Diagnosis Information:  · Diagnosis: neck pain, lumbar pain  Insurance/Certification information:  PT Insurance Information: [employer]  Physician Information:  Referring Practitioner: Socorro Arroyo MD  Plan of care signed (Y/N):  N  Visit# / total visits:  3/8     G-Code (if applicable):         PT G-Codes  Functional Assessment Tool Used: Modified Oswestry  Score: 26 (52%)    Time in:   9:00      Timed Treatment: 33min. Total Treatment Time: 33min.  _____________________________________________________________________________________    Pain Level:    8/10  SUBJECTIVE:  Patient reports \"I have a headache, I think I am just stressed because my daughter got  yesterday\". OBJECTIVE:     Exercise/Equipment Resistance/Repetitions Other comments   TG + MH   x5min    TA set [id number]  KFO: x10    bridge 2x10    multif hip lift    Clamshell      Supine clams  SL hip ER with stabilization  x15  x10 B Green TB                                                      Other Therapeutic Activities:  --    Manual Treatments:    L gary with cavitation; side-lying L sacral MET correction;     Modalities:  --    Test/Measurements:  See eval       ASSESSMENT:   Pt tolerated session well. Pt demonstrated improved ambulation and decreased symptoms following manual therapy    Treatment/Activity Tolerance:   X Patient tolerated treatment well   ? Patient limited by fatique  ? Patient limited by pain ? Patient limited by other medical complications  ?  Other:       Goals:    Short term goals  Time Frame for Short term goals: 4 weeks  Short term goal 1: pt will be indep in HEP  Short term goal 2: pt will decrease pain 25%  Short term goal 3: pt will increase B hip strength 4/5  Short term goal 4: pt will return to clinic with symmetrical pelvic alignment  Short term goal 5: pt will return to caring for her children without limitations           Plan: x Continue per plan of care ? Alter current plan (see comments)   ? Plan of care initiated  ? Hold pending MD visit ?  Discharge      Plan for Next Session:      Re-Certification Due Date:         Signature:  Torin Graves PT, DPT

## 2019-05-09 NOTE — PROGRESS NOTES
OUTPATIENT PROGRESS NOTE  Date of Service:  5/9/2019  Address: [facility] [address] [place] [address] Christina Momo  Dept: [phone]  Loc: [phone]    Subjective:      Patient ID:  [id number]  Efra Riley is a 38 y.o. female    HPI: Pt is here with burning with urination, pt is end of her period. Pt has noticed ear crackling right ear. Pt noticed a week ago. Pt denies head congestion. Pt said her allergies are ok. Review of Systems   Constitutional: Negative for chills, fatigue and fever. HENT: Positive for ear pain. Negative for congestion, ear discharge, hearing loss, postnasal drip, rhinorrhea, sinus pressure, sinus pain and sneezing. Genitourinary: Positive for dysuria. All other systems reviewed and are negative. Objective:   Physical Exam   Constitutional: She is oriented to person, place, and time. She appears well-developed and well-nourished. HENT:   Head: Normocephalic and atraumatic. Right Ear: External ear and ear canal normal. Tympanic membrane is injected. A middle ear effusion is present. Left Ear: External ear and ear canal normal. A middle ear effusion is present. Nose: Nose normal.   Mouth/Throat: Uvula is midline and oropharynx is clear and moist. No oropharyngeal exudate. Cardiovascular: Normal rate, regular rhythm and normal heart sounds. Exam reveals no friction rub. No murmur heard. Pulmonary/Chest: Effort normal and breath sounds normal. No stridor. She has no wheezes. Neurological: She is alert and oriented to person, place, and time. Skin: Skin is warm and dry. Capillary refill takes less than 2 seconds. Psychiatric: She has a normal mood and affect. Her behavior is normal. Judgment and thought content normal.   Vitals reviewed. Assessment/Plan   1. Dysuria  Suspect pt has interstitial cystitis, pt does not have much on dip.  Educated pt about the importance of drinking water, pt admits she does not drink much water, will send out for culture to make sure not UTI.   - POCT Urinalysis no Micro  - Urine Culture    2. Recurrent acute suppurative otitis media of right ear with spontaneous rupture of tympanic membrane  Patient does have ear infection will treat with Augmentin due to patient having sinusitis as well. - amoxicillin-clavulanate (AUGMENTIN) 875-125 MG per tablet; Take 1 tablet by mouth 2 times daily for 10 days  Dispense: 20 tablet; Refill: 0      Call symptoms worsen or do not improve.                 Sosa Marie, APRN - CNP

## 2019-05-11 LAB — URINE CULTURE, ROUTINE: NORMAL

## 2019-05-13 ENCOUNTER — HOSPITAL ENCOUNTER (OUTPATIENT)
Dept: PHYSICAL THERAPY | Age: 44
Setting detail: THERAPIES SERIES
Discharge: HOME | End: 2019-05-13
Payer: COMMERCIAL

## 2019-05-13 PROCEDURE — 97140 MANUAL THERAPY 1/> REGIONS: CPT

## 2019-05-13 PROCEDURE — 97110 THERAPEUTIC EXERCISES: CPT

## 2019-05-13 NOTE — FLOWSHEET NOTE
Physical Therapy Daily Treatment Note    Date:  2019    Patient Name:  Macho Gant  \"JNDWO\"  :  1975  MRN: [id number]  Restrictions/Precautions: universal  Medical/Treatment Diagnosis Information:  · Diagnosis: neck pain, lumbar pain  Insurance/Certification information:  PT Insurance Information: [employer] Carpio  Physician Information:  Referring Practitioner: Cresencio Guerra MD  Plan of care signed (Y/N):  N  Visit# / total visits:       G-Code (if applicable):         PT G-Codes  Functional Assessment Tool Used: Modified Oswestry  Score: 26 (52%)    Time in:   9:00      Timed Treatment: 30min. Total Treatment Time: 30min.  _____________________________________________________________________________________    Pain Level:    8/10  SUBJECTIVE:  Patient reports that after she left here Thursday she felt good but by Thursday night she was hurting. Pt reports that she is emotional today. OBJECTIVE:     Exercise/Equipment Resistance/Repetitions Other comments   TG + MH   x5min    TA set [id number]  KFO: x10    bridge x10    multif hip lift    Clamshell      Supine clams  SL hip ER with stabilization  x15  x10 B Green TB   Lumbopelvic stabilization 2 min Foam roll   3-way ball compression 2x30\"  10x5\" diagonally                                             Other Therapeutic Activities:  --    Manual Treatments:    ; inferior hip mobilization grade IV RLE; breaking bread lumbar spine R; shotgun tech with cavitation;     Modalities:  --    Test/Measurements:  See eval       ASSESSMENT:   Pt tolerated session well. Pt demonstrated improved ambulation and decreased symptoms following manual therapy. Treatment/Activity Tolerance:   X Patient tolerated treatment well   ? Patient limited by fatique  ? Patient limited by pain ? Patient limited by other medical complications  ?  Other:       Goals:    Short term goals  Time Frame for Short term goals: 4 weeks  Short term goal 1: pt will be indep in HEP  Short term goal 2: pt will decrease pain 25%  Short term goal 3: pt will increase B hip strength 4/5  Short term goal 4: pt will return to clinic with symmetrical pelvic alignment  Short term goal 5: pt will return to caring for her children without limitations           Plan: x Continue per plan of care ? Alter current plan (see comments)   ? Plan of care initiated  ? Hold pending MD visit ?  Discharge      Plan for Next Session:      Re-Certification Due Date:         Signature:  Lara Rogers PT, DPT

## 2019-05-15 ENCOUNTER — HOSPITAL ENCOUNTER (OUTPATIENT)
Dept: PHYSICAL THERAPY | Age: 44
Setting detail: THERAPIES SERIES
Discharge: HOME | End: 2019-05-15
Payer: COMMERCIAL

## 2019-05-15 PROCEDURE — 97140 MANUAL THERAPY 1/> REGIONS: CPT

## 2019-05-15 PROCEDURE — 97110 THERAPEUTIC EXERCISES: CPT

## 2019-05-15 NOTE — FLOWSHEET NOTE
Physical Therapy Daily Treatment Note    Date:  5/15/2019    Patient Name:  Fabien Badillo  \"BWPCB\"  :  1975  MRN: [id number]  Restrictions/Precautions: universal  Medical/Treatment Diagnosis Information:  · Diagnosis: neck pain, lumbar pain  Insurance/Certification information:  PT Insurance Information: [address]  Physician Information:  Referring Practitioner: Marzena Doaln MD  Plan of care signed (Y/N):  N  Visit# / total visits:       G-Code (if applicable):         PT G-Codes  Functional Assessment Tool Used: Modified Oswestry  Score: 26 (52%)    Time in:   9:00      Timed Treatment: 35min. Total Treatment Time: 35min.  _____________________________________________________________________________________    Pain Level:    8/10  SUBJECTIVE:  Nothing new to report. \"I'm out of shape. \"    OBJECTIVE:     Exercise/Equipment Resistance/Repetitions Other comments   TG + MH   x5min    TA set     bridge x10  x10 feet on ball    multif hip lift    Clamshell      Supine clams  SL hip ER with stabilization  x15  x10 B Green TB   Lumbopelvic stabilization  Foam roll   3-way ball compression 2x30\"  10x5\" diagonally    Kneeling posterior oblique sling x10 B                                        Other Therapeutic Activities:  --    Manual Treatments:    R gary without cavitation; inferior hip mobilization grade IV RLE; breaking bread lumbar spine R; shotgun tech with cavitation;     Modalities:  --    Test/Measurements:  See eval       ASSESSMENT:   Pt tolerated session well. Pt demonstrated improved ambulation and decreased symptoms following manual therapy. Poor endurance of lumbopelvic and lateral hip stabilizers. Treatment/Activity Tolerance:   X Patient tolerated treatment well   ? Patient limited by fatique  ? Patient limited by pain ? Patient limited by other medical complications  ?  Other:       Goals:    Short term goals  Time Frame for Short term goals: 4 weeks  Short term goal 1: pt will be

## 2019-05-20 ENCOUNTER — HOSPITAL ENCOUNTER (OUTPATIENT)
Dept: PHYSICAL THERAPY | Age: 44
Setting detail: THERAPIES SERIES
Discharge: HOME | End: 2019-05-20
Payer: COMMERCIAL

## 2019-05-20 PROCEDURE — 97110 THERAPEUTIC EXERCISES: CPT

## 2019-05-20 NOTE — FLOWSHEET NOTE
Physical Therapy Daily Treatment Note    Date:  2019    Patient Name:  Macho Gant  \"PARAM\"  :  1975  MRN: [id number]  Restrictions/Precautions: universal  Medical/Treatment Diagnosis Information:  · Diagnosis: neck pain, lumbar pain  Insurance/Certification information:  PT Insurance Information: [address]  Physician Information:  Referring Practitioner: Cresencio Guerra MD  Plan of care signed (Y/N):  N  Visit# / total visits:       G-Code (if applicable):         PT G-Codes  Functional Assessment Tool Used: Modified Oswestry  Score: 26 (52%)    Time in:   9:00      Timed Treatment: 35min. Total Treatment Time: 35min.  _____________________________________________________________________________________    Pain Level:    /10  SUBJECTIVE:  \"Same old stuff. \"    OBJECTIVE:     Exercise/Equipment Resistance/Repetitions Other comments   TG + MH       TA set     bridge 10x4\" feet on ball    multif hip lift    Clamshell      Supine clams  SL hip ER with stabilization   Green TB   Lumbopelvic stabilization  Foam roll   3-way ball compression 2x30\"  10x5\" diagonally    Kneeling posterior oblique sling x15 B    Posterior sling step -up x10 B    Supine hip neuro re-ed 5 min         Standing hip add ball squeeze with S' horiz abd x10 B                         Other Therapeutic Activities:  --    Manual Treatments:    R gary with cavitation; inferior hip mobilization grade IV RLE; breaking bread lumbar spine R; shotgun tech with cavitation;     Modalities:  --    Test/Measurements:  See eval       ASSESSMENT:   Pt tolerated session well. SIJD corrected with manual therapy. Poor endurance of lumbopelvic and lateral hip stabilizers. Pt continues to fall into lumbar hyperextension with standing exercise unless cued by PT. Treatment/Activity Tolerance:   X Patient tolerated treatment well   ? Patient limited by fatique  ? Patient limited by pain ? Patient limited by other medical complications  ?

## 2019-05-23 ENCOUNTER — HOSPITAL ENCOUNTER (OUTPATIENT)
Dept: PHYSICAL THERAPY | Age: 44
Setting detail: THERAPIES SERIES
Discharge: HOME | End: 2019-05-23
Payer: COMMERCIAL

## 2019-05-23 PROCEDURE — 97140 MANUAL THERAPY 1/> REGIONS: CPT

## 2019-05-23 PROCEDURE — 97110 THERAPEUTIC EXERCISES: CPT

## 2019-05-23 NOTE — FLOWSHEET NOTE
Physical Therapy Daily Treatment Note    Date:  2019    Patient Name:  Manuel Jain  \"JEVGU\"  :  1975  MRN: [id number]  Restrictions/Precautions: universal  Medical/Treatment Diagnosis Information:  · Diagnosis: neck pain, lumbar pain  Insurance/Certification information:  PT Insurance Information: [address]  Physician Information:  Referring Practitioner: Alysha Almaraz MD  Plan of care signed (Y/N):  N  Visit# / total visits:       G-Code (if applicable):         PT G-Codes  Functional Assessment Tool Used: Modified Oswestry  Score: 26 (52%)    Time in:   9:00      Timed Treatment: 30min. Total Treatment Time: 30min.  _____________________________________________________________________________________    Pain Level:    /10  SUBJECTIVE:  Pt notes that she tried jogging on Monday but had a pain in her groin that shot down her RLE. OBJECTIVE:     Exercise/Equipment Resistance/Repetitions Other comments   TG + MH       TA set     bridge 10x4\" feet on ball    multif hip lift    Clamshell      Supine clams  SL hip ER with stabilization   Green TB   Lumbopelvic stabilization  Foam roll   3-way ball compression 2x30\"  10x5\" diagonally    Kneeling posterior oblique sling x15 B    Posterior sling step -up x10 B    Supine hip neuro re-ed 5 min         Standing hip add ball squeeze with S' horiz abd x10 B                         Other Therapeutic Activities:  --    Manual Treatments:    R gary with cavitation; inferior hip mobilization grade IV RLE; ; shotgun tech with cavitation; side-lying lumbar roll grade IV with cavitation;     Modalities:  --    Test/Measurements:  See eval       ASSESSMENT:   Pt tolerated session well. SIJD corrected with manual therapy. Poor endurance of lumbopelvic and lateral hip stabilizers. Pt continues to fall into lumbar hyperextension with standing exercise unless cued by PT. Treatment/Activity Tolerance:   X Patient tolerated treatment well   ?  Patient limited by fatique  ? Patient limited by pain ? Patient limited by other medical complications  ? Other:       Goals:    Short term goals  Time Frame for Short term goals: 4 weeks  Short term goal 1: pt will be indep in HEP  Short term goal 2: pt will decrease pain 25%  Short term goal 3: pt will increase B hip strength 4/5  Short term goal 4: pt will return to clinic with symmetrical pelvic alignment  Short term goal 5: pt will return to caring for her children without limitations           Plan: x Continue per plan of care ? Alter current plan (see comments)   ? Plan of care initiated  ? Hold pending MD visit ?  Discharge      Plan for Next Session:      Re-Certification Due Date:         Signature:  Dc Steele PT, DPT

## 2019-05-28 ENCOUNTER — HOSPITAL ENCOUNTER (OUTPATIENT)
Dept: PHYSICAL THERAPY | Age: 44
Setting detail: THERAPIES SERIES
End: 2019-05-28
Payer: COMMERCIAL

## 2019-05-30 ENCOUNTER — HOSPITAL ENCOUNTER (OUTPATIENT)
Dept: PHYSICAL THERAPY | Age: 44
Setting detail: THERAPIES SERIES
Discharge: HOME | End: 2019-05-30
Payer: COMMERCIAL

## 2019-05-30 NOTE — PROGRESS NOTES
Physical Therapy  Cancellation/No-show Note  Patient Name:  Chalmers Brittle  :  1975   Date:  2019  Cancels to date: 1  No-shows to date: 0    For today's appointment patient:  [x] Cancelled  [] Rescheduled appointment  [] No-show     Reason given by patient:  [x] Patient ill (migraine)   [] Conflicting appointment  [] No transportation    [] Conflict with work  [] No reason given  [] Other:     Comments:      Electronically signed by:  Masoud Chavez PT

## 2019-07-03 ENCOUNTER — OFFICE VISIT (OUTPATIENT)
Dept: FAMILY MEDICINE CLINIC | Age: 44
End: 2019-07-03
Payer: COMMERCIAL

## 2019-07-03 ENCOUNTER — TELEPHONE (OUTPATIENT)
Dept: ENT CLINIC | Age: 44
End: 2019-07-03

## 2019-07-03 VITALS
OXYGEN SATURATION: 99 % | HEART RATE: 69 BPM | SYSTOLIC BLOOD PRESSURE: 118 MMHG | WEIGHT: 120.2 LBS | TEMPERATURE: 98.2 F | BODY MASS INDEX: 20 KG/M2 | DIASTOLIC BLOOD PRESSURE: 62 MMHG

## 2019-07-03 DIAGNOSIS — H66.93 CHRONIC EAR INFECTION, BILATERAL: Primary | ICD-10-CM

## 2019-07-03 DIAGNOSIS — H66.91 RIGHT OTITIS MEDIA WITH SPONTANEOUS RUPTURE OF EARDRUM: ICD-10-CM

## 2019-07-03 DIAGNOSIS — H72.91 RIGHT OTITIS MEDIA WITH SPONTANEOUS RUPTURE OF EARDRUM: ICD-10-CM

## 2019-07-03 DIAGNOSIS — M54.50 LUMBAR PAIN: ICD-10-CM

## 2019-07-03 PROCEDURE — 99214 OFFICE O/P EST MOD 30 MIN: CPT | Performed by: NURSE PRACTITIONER

## 2019-07-03 RX ORDER — CIPROFLOXACIN AND DEXAMETHASONE 3; 1 MG/ML; MG/ML
4 SUSPENSION/ DROPS AURICULAR (OTIC) 2 TIMES DAILY
Qty: 1 BOTTLE | Refills: 1 | Status: SHIPPED | OUTPATIENT
Start: 2019-07-03 | End: 2019-07-10

## 2019-07-03 RX ORDER — TOPIRAMATE 100 MG/1
100 TABLET, FILM COATED ORAL 2 TIMES DAILY
COMMUNITY
End: 2020-06-18 | Stop reason: ALTCHOICE

## 2019-07-03 ASSESSMENT — ENCOUNTER SYMPTOMS
DIARRHEA: 0
RHINORRHEA: 0
VOMITING: 0
ABDOMINAL PAIN: 0
SORE THROAT: 0
CHEST TIGHTNESS: 0
SHORTNESS OF BREATH: 0
SINUS PAIN: 0
COUGH: 0
NAUSEA: 0
BACK PAIN: 1
TROUBLE SWALLOWING: 0
EYE DISCHARGE: 0
COLOR CHANGE: 0
SINUS PRESSURE: 0

## 2019-07-03 ASSESSMENT — PATIENT HEALTH QUESTIONNAIRE - PHQ9
SUM OF ALL RESPONSES TO PHQ9 QUESTIONS 1 & 2: 0
1. LITTLE INTEREST OR PLEASURE IN DOING THINGS: 0
SUM OF ALL RESPONSES TO PHQ QUESTIONS 1-9: 0
SUM OF ALL RESPONSES TO PHQ QUESTIONS 1-9: 0
2. FEELING DOWN, DEPRESSED OR HOPELESS: 0

## 2019-07-03 NOTE — PROGRESS NOTES
7/3/2019     Lakshmi Calix (:  1975) is a 38 y.o. female, here for evaluation of the following medical concerns: She is here after recent diagnosis of otitis media with two failures of oral abx and ciprodex drops. Seen in our office and little clinic. PT for back- not helpful- pain over 6 months- requesting MRI      Otalgia    There is pain in both (right is worse, left always hurts) ears. This is a chronic problem. The current episode started more than 1 month ago. The problem occurs constantly. The problem has been gradually worsening. There has been no fever. The patient is experiencing no pain. Associated symptoms include headaches and hearing loss (migraines- chronic-  botox). Pertinent negatives include no abdominal pain, coughing, diarrhea, ear discharge, neck pain, rash, rhinorrhea, sore throat or vomiting. She has tried ear drops and antibiotics for the symptoms. Her past medical history is significant for a chronic ear infection, hearing loss and a tympanostomy tube. Review of Systems   Constitutional: Negative for appetite change, chills, fatigue and fever. HENT: Positive for ear pain and hearing loss (migraines- chronic-  botox). Negative for congestion, ear discharge, postnasal drip, rhinorrhea, sinus pressure, sinus pain, sore throat and trouble swallowing. Eyes: Negative for discharge. Respiratory: Negative for cough, chest tightness and shortness of breath. Cardiovascular: Negative for chest pain and palpitations. Gastrointestinal: Negative for abdominal pain, diarrhea, nausea and vomiting. Endocrine: Negative for cold intolerance, heat intolerance and polyuria. Musculoskeletal: Positive for back pain. Negative for gait problem and neck pain. Skin: Negative for color change and rash. Allergic/Immunologic: Negative for environmental allergies, food allergies and immunocompromised state. Neurological: Positive for headaches.  Negative for dizziness Nursing Note by Cristina Drake 

## 2019-07-17 ENCOUNTER — HOSPITAL ENCOUNTER (OUTPATIENT)
Dept: MRI IMAGING | Age: 44
Discharge: HOME | End: 2019-07-17
Payer: COMMERCIAL

## 2019-07-17 DIAGNOSIS — M54.50 LUMBAR PAIN: ICD-10-CM

## 2019-07-17 PROCEDURE — 72148 MRI LUMBAR SPINE W/O DYE: CPT

## 2019-09-13 ENCOUNTER — HOSPITAL ENCOUNTER (EMERGENCY)
Age: 44
Discharge: HOME | End: 2019-09-13
Attending: EMERGENCY MEDICINE
Payer: COMMERCIAL

## 2019-09-13 VITALS
BODY MASS INDEX: 19.3 KG/M2 | OXYGEN SATURATION: 99 % | SYSTOLIC BLOOD PRESSURE: 111 MMHG | DIASTOLIC BLOOD PRESSURE: 77 MMHG | RESPIRATION RATE: 18 BRPM | TEMPERATURE: 98.9 F | WEIGHT: 116 LBS | HEART RATE: 81 BPM

## 2019-09-13 DIAGNOSIS — R51.9 INTRACTABLE EPISODIC HEADACHE, UNSPECIFIED HEADACHE TYPE: Primary | ICD-10-CM

## 2019-09-13 LAB — HCG(URINE) PREGNANCY TEST: NEGATIVE

## 2019-09-13 PROCEDURE — 6360000002 HC RX W HCPCS: Performed by: EMERGENCY MEDICINE

## 2019-09-13 PROCEDURE — 84703 CHORIONIC GONADOTROPIN ASSAY: CPT

## 2019-09-13 PROCEDURE — 96374 THER/PROPH/DIAG INJ IV PUSH: CPT

## 2019-09-13 PROCEDURE — 96375 TX/PRO/DX INJ NEW DRUG ADDON: CPT

## 2019-09-13 PROCEDURE — 99283 EMERGENCY DEPT VISIT LOW MDM: CPT

## 2019-09-13 RX ORDER — METOCLOPRAMIDE HYDROCHLORIDE 5 MG/ML
10 INJECTION INTRAMUSCULAR; INTRAVENOUS ONCE
Status: COMPLETED | OUTPATIENT
Start: 2019-09-13 | End: 2019-09-13

## 2019-09-13 RX ORDER — KETOROLAC TROMETHAMINE 30 MG/ML
15 INJECTION, SOLUTION INTRAMUSCULAR; INTRAVENOUS ONCE
Status: COMPLETED | OUTPATIENT
Start: 2019-09-13 | End: 2019-09-13

## 2019-09-13 RX ORDER — DIPHENHYDRAMINE HYDROCHLORIDE 50 MG/ML
25 INJECTION INTRAMUSCULAR; INTRAVENOUS ONCE
Status: COMPLETED | OUTPATIENT
Start: 2019-09-13 | End: 2019-09-13

## 2019-09-13 RX ADMIN — KETOROLAC TROMETHAMINE 15 MG: 30 INJECTION, SOLUTION INTRAMUSCULAR at 13:32

## 2019-09-13 RX ADMIN — DIPHENHYDRAMINE HYDROCHLORIDE 25 MG: 50 INJECTION, SOLUTION INTRAMUSCULAR; INTRAVENOUS at 13:32

## 2019-09-13 RX ADMIN — METOCLOPRAMIDE 10 MG: 5 INJECTION, SOLUTION INTRAMUSCULAR; INTRAVENOUS at 13:33

## 2019-09-13 ASSESSMENT — ENCOUNTER SYMPTOMS
DIARRHEA: 0
ABDOMINAL DISTENTION: 0
VOMITING: 0
PHOTOPHOBIA: 0
NAUSEA: 0
SHORTNESS OF BREATH: 0
WHEEZING: 0
RHINORRHEA: 0
BACK PAIN: 0
COUGH: 0

## 2019-09-13 ASSESSMENT — PAIN SCALES - GENERAL
PAINLEVEL_OUTOF10: 8
PAINLEVEL_OUTOF10: 3

## 2019-09-13 NOTE — ED PROVIDER NOTES
to light. EOM are normal.   Neck: Normal range of motion. Neck supple. Cardiovascular: Normal rate and regular rhythm. No murmur heard. Pulmonary/Chest: Effort normal and breath sounds normal.   Musculoskeletal: Normal range of motion. She exhibits no deformity. Neurological: She is alert and oriented to person, place, and time. No cranial nerve deficit. She exhibits normal muscle tone. Skin: Skin is warm. No rash noted. Psychiatric: She has a normal mood and affect. Her behavior is normal.   Nursing note and vitals reviewed. ED Course    ED Medication Orders (From admission, onward)    Start Ordered     Status Ordering Provider    09/13/19 1315 09/13/19 1305  ketorolac (TORADOL) injection 15 mg  ONCE      Last MAR action:  Given - [facility]    09/13/19 1315 09/13/19 1305  metoclopramide (REGLAN) injection 10 mg  ONCE      Last MAR action:  Given - by Jas [facility]13/[facility]    09/13/19 1315 09/13/19 1305  diphenhydrAMINE (BENADRYL) injection 25 mg  ONCE      Last MAR action:  Given - by Jas Reyez on 09/13/19 at [address] [facility], [address] [facility] Neda GARCIA              Radiology  No results found. Labs  Results for orders placed or performed during the hospital encounter of 09/13/19   Pregnancy, Urine   Result Value Ref Range    HCG(Urine) Pregnancy Test Negative Detects HCG level >20 MIU/mL         MDM  25-year-old female with history of migraine headaches followed by neurology on triptan's, presents with intractable migraine for the past 3 weeks. There are no associated red flag signs. She describes her headache is exactly the same as it usually is with no new qualities or new symptoms. On exam she is well-appearing no acute distress, her vitals are reassuring. She has no focal neurological deficits per my exam.  Patient has not tried any NSAIDs prior to coming into the ED.   Will treat with migraine cocktail and will [id number]  [phone]          Your neurologist              Total critical care time is 0 minutes, which excludes separately billable procedures and updating family. Time spent is specifically for management of the presenting complaint and symptoms initially, direct bedside care, reevaluation, review of records, and consultation. There was a high probability of clinically significant life-threatening deterioration in the patient's condition, which required my urgent intervention. This chart was generated in part by using Dragon Dictation system and may contain errors related to that system including errors in grammar, punctuation, and spelling, as well as words and phrases that may be inappropriate. If there are any questions or concerns please feel free to contact the dictating provider for clarification.      MD Mikael Irby MD  09/13/19 0040

## 2019-11-19 ENCOUNTER — TELEPHONE (OUTPATIENT)
Dept: FAMILY MEDICINE CLINIC | Age: 44
End: 2019-11-19

## 2019-12-23 RX ORDER — LORAZEPAM 0.5 MG/1
TABLET ORAL
Qty: 20 TABLET | Refills: 0 | OUTPATIENT
Start: 2019-12-23

## 2020-03-10 ENCOUNTER — TELEPHONE (OUTPATIENT)
Dept: FAMILY MEDICINE CLINIC | Age: 45
End: 2020-03-10

## 2020-03-11 ENCOUNTER — OFFICE VISIT (OUTPATIENT)
Dept: FAMILY MEDICINE CLINIC | Age: 45
End: 2020-03-11
Payer: COMMERCIAL

## 2020-03-11 VITALS
HEART RATE: 76 BPM | OXYGEN SATURATION: 99 % | SYSTOLIC BLOOD PRESSURE: 102 MMHG | HEIGHT: 65 IN | WEIGHT: 120 LBS | DIASTOLIC BLOOD PRESSURE: 70 MMHG | BODY MASS INDEX: 19.99 KG/M2

## 2020-03-11 PROCEDURE — 99214 OFFICE O/P EST MOD 30 MIN: CPT | Performed by: FAMILY MEDICINE

## 2020-03-11 PROCEDURE — G0444 DEPRESSION SCREEN ANNUAL: HCPCS | Performed by: FAMILY MEDICINE

## 2020-03-11 PROCEDURE — G8431 POS CLIN DEPRES SCRN F/U DOC: HCPCS | Performed by: FAMILY MEDICINE

## 2020-03-11 RX ORDER — DESVENLAFAXINE 50 MG/1
50 TABLET, EXTENDED RELEASE ORAL DAILY
Qty: 30 TABLET | Refills: 12 | Status: SHIPPED | OUTPATIENT
Start: 2020-03-11 | End: 2020-06-18 | Stop reason: ALTCHOICE

## 2020-03-11 RX ORDER — TIZANIDINE 2 MG/1
2 TABLET ORAL 2 TIMES DAILY PRN
Qty: 60 TABLET | Refills: 2 | Status: SHIPPED | OUTPATIENT
Start: 2020-03-11 | End: 2020-04-17

## 2020-03-11 ASSESSMENT — COLUMBIA-SUICIDE SEVERITY RATING SCALE - C-SSRS
1. WITHIN THE PAST MONTH, HAVE YOU WISHED YOU WERE DEAD OR WISHED YOU COULD GO TO SLEEP AND NOT WAKE UP?: NO
2. HAVE YOU ACTUALLY HAD ANY THOUGHTS OF KILLING YOURSELF?: NO
6. HAVE YOU EVER DONE ANYTHING, STARTED TO DO ANYTHING, OR PREPARED TO DO ANYTHING TO END YOUR LIFE?: NO

## 2020-03-11 ASSESSMENT — PATIENT HEALTH QUESTIONNAIRE - PHQ9
2. FEELING DOWN, DEPRESSED OR HOPELESS: 3
10. IF YOU CHECKED OFF ANY PROBLEMS, HOW DIFFICULT HAVE THESE PROBLEMS MADE IT FOR YOU TO DO YOUR WORK, TAKE CARE OF THINGS AT HOME, OR GET ALONG WITH OTHER PEOPLE: 2
1. LITTLE INTEREST OR PLEASURE IN DOING THINGS: 3
6. FEELING BAD ABOUT YOURSELF - OR THAT YOU ARE A FAILURE OR HAVE LET YOURSELF OR YOUR FAMILY DOWN: 3
7. TROUBLE CONCENTRATING ON THINGS, SUCH AS READING THE NEWSPAPER OR WATCHING TELEVISION: 2
8. MOVING OR SPEAKING SO SLOWLY THAT OTHER PEOPLE COULD HAVE NOTICED. OR THE OPPOSITE, BEING SO FIGETY OR RESTLESS THAT YOU HAVE BEEN MOVING AROUND A LOT MORE THAN USUAL: 1
SUM OF ALL RESPONSES TO PHQ9 QUESTIONS 1 & 2: 6
3. TROUBLE FALLING OR STAYING ASLEEP: 3
SUM OF ALL RESPONSES TO PHQ QUESTIONS 1-9: 24
9. THOUGHTS THAT YOU WOULD BE BETTER OFF DEAD, OR OF HURTING YOURSELF: 3
4. FEELING TIRED OR HAVING LITTLE ENERGY: 3
5. POOR APPETITE OR OVEREATING: 3
SUM OF ALL RESPONSES TO PHQ QUESTIONS 1-9: 24

## 2020-03-11 ASSESSMENT — ENCOUNTER SYMPTOMS
CHEST TIGHTNESS: 0
WHEEZING: 0
DIARRHEA: 0
EYES NEGATIVE: 1
SHORTNESS OF BREATH: 0
CONSTIPATION: 0
BACK PAIN: 1
ABDOMINAL PAIN: 0
COLOR CHANGE: 0
COUGH: 0
NAUSEA: 0
ABDOMINAL DISTENTION: 0
BLOOD IN STOOL: 0
VOMITING: 0

## 2020-03-11 NOTE — PROGRESS NOTES
Patient: Randi Larson is a 38 y. o.female who presents today with the following Chief Complaint(s):  Chief Complaint   Patient presents with    Ear Drainage     pt c/o ear wetness, numbness, burning- had 2 tympanoplasty's in the past    Depression     Pt would like to discuss restarting depression medications and review          HPI: R lbp cont's >1 yr, more severe over time. Pain radiates to ant b/l hips. Has continous stab pain in R SI. Goes to gym daily. When walks on incline x 1 hr qd, has no pain. PT did not help. Tilting pelvis forward lessens lbp. Standing for more than 5 min is painful. Can't tolerate grocery shopping, is to have running boards on car 2/2 pain when getting in/out of car. Saw Dr Jorje Esteban NP who ordered back brace, minimally helpful. Her note suggested injections could be considered though were not arranged. Was told by NP she had conversion d/o, upsetting to pt who chooses not to return. Saw chiro but did not feel he was helpful. Feels depression has flared 2/2 pain. Dtr is due with 1st grandchild 5/2020. Wants to be happy. Cries easily, if she sees a dog, a deer, when she thinks of boys growning up. Has neck pain x 11 yr since birth of youngest child. Pain worsened after falling down steps 2014. Dr Serjio Loya,  neurologist, did 3 rounds of botox for migraines, not helpful other than 1 wk migraine free after 1st series. Has 20 basil/wk. Wonders about [employer]. Owes neurologist money, isn't sure she will f/u. Current Outpatient Medications   Medication Sig Dispense Refill    desvenlafaxine succinate (PRISTIQ) 50 MG TB24 extended release tablet Take 1 tablet by mouth daily 30 tablet 12    rizatriptan (MAXALT-MLT) 10 MG disintegrating tablet DISSOLVE ONE TABLET BY MOUTH AT ONSET OF HEADACHE; MAY REPEAT ONE TABLET IN 2 HOURS IF NEEDED.  12 tablet 4    celecoxib (CELEBREX) 200 MG capsule Take 1 capsule by mouth daily as needed for Pain (Patient not taking: Reported on 3/11/2020) 30 capsule 5    Onabotulinumtoxin A (BOTOX) 200 units injection 200 Units by Other route once Gets shots on forehead for migraines      topiramate (TOPAMAX) 100 MG tablet Take 100 mg by mouth 2 times daily       No current facility-administered medications for this visit. Patient's past medical history,surgical history, family history, medications,  and allergies  were all reviewed and updated as appropriate today. Review of Systems   Constitutional: Positive for fatigue. Negative for activity change, appetite change, chills, fever and unexpected weight change. HENT: Positive for ear discharge (L ear feels moist) and ear pain. Eyes: Negative. Respiratory: Negative for cough, chest tightness, shortness of breath and wheezing. Cardiovascular: Negative for chest pain, palpitations and leg swelling. Gastrointestinal: Negative for abdominal distention, abdominal pain, blood in stool, constipation, diarrhea, nausea and vomiting. Genitourinary: Negative for difficulty urinating and dysuria. Musculoskeletal: Positive for back pain, gait problem, neck pain and neck stiffness. Negative for arthralgias. Skin: Negative for color change, pallor and rash. Neurological: Positive for headaches. Negative for dizziness, syncope, weakness and light-headedness. Hematological: Negative. Psychiatric/Behavioral: Positive for dysphoric mood and sleep disturbance. Negative for suicidal ideas. The patient is nervous/anxious. Physical Exam  Constitutional:       General: She is not in acute distress. Appearance: Normal appearance. She is well-developed. Comments: tearful   HENT:      Head: Normocephalic and atraumatic. Right Ear: External ear normal. There is no impacted cerumen. Left Ear: External ear normal. There is impacted cerumen. Mouth/Throat:      Mouth: Mucous membranes are moist.      Pharynx: Oropharynx is clear.    Eyes:      General: No scleral following plan was implemented: medication prescribed: Pristiq- 48- patient will call for any significant medication side effects or worsening symptoms of depression. Patient will follow-up in 1 month(s) with PCP.

## 2020-04-16 ENCOUNTER — TELEPHONE (OUTPATIENT)
Dept: FAMILY MEDICINE CLINIC | Age: 45
End: 2020-04-16

## 2020-04-16 DIAGNOSIS — M54.50 LUMBAR PAIN: Primary | ICD-10-CM

## 2020-04-17 RX ORDER — METHYLPREDNISOLONE 4 MG/1
TABLET ORAL
Qty: 21 TABLET | Refills: 0 | Status: SHIPPED | OUTPATIENT
Start: 2020-04-17 | End: 2020-06-18 | Stop reason: ALTCHOICE

## 2020-05-22 ENCOUNTER — TELEPHONE (OUTPATIENT)
Dept: FAMILY MEDICINE CLINIC | Age: 45
End: 2020-05-22

## 2020-05-27 RX ORDER — RIZATRIPTAN BENZOATE 10 MG/1
TABLET, ORALLY DISINTEGRATING ORAL
Qty: 12 TABLET | Refills: 4 | Status: SHIPPED | OUTPATIENT
Start: 2020-05-27 | End: 2020-06-18 | Stop reason: ALTCHOICE

## 2020-05-27 NOTE — TELEPHONE ENCOUNTER
Requested Prescriptions     Pending Prescriptions Disp Refills    rizatriptan (MAXALT-MLT) 10 MG disintegrating tablet 12 tablet 4     Sig: DISSOLVE ONE TABLET BY MOUTH AT ONSET OF HEADACHE; MAY REPEAT ONE TABLET IN 2 HOURS IF NEEDED.    Andrew Blue  Last ov 03/11/20

## 2020-06-02 ENCOUNTER — HOSPITAL ENCOUNTER (OUTPATIENT)
Dept: WOMENS IMAGING | Age: 45
Discharge: HOME | End: 2020-06-02
Payer: COMMERCIAL

## 2020-06-02 PROCEDURE — 77067 SCR MAMMO BI INCL CAD: CPT

## 2020-06-18 ENCOUNTER — OFFICE VISIT (OUTPATIENT)
Dept: ENT CLINIC | Age: 45
End: 2020-06-18
Payer: COMMERCIAL

## 2020-06-18 VITALS
BODY MASS INDEX: 20.83 KG/M2 | TEMPERATURE: 97.9 F | WEIGHT: 125 LBS | DIASTOLIC BLOOD PRESSURE: 60 MMHG | SYSTOLIC BLOOD PRESSURE: 100 MMHG | HEART RATE: 65 BPM | HEIGHT: 65 IN

## 2020-06-18 PROCEDURE — 99212 OFFICE O/P EST SF 10 MIN: CPT | Performed by: OTOLARYNGOLOGY

## 2020-06-18 PROCEDURE — 69210 REMOVE IMPACTED EAR WAX UNI: CPT | Performed by: OTOLARYNGOLOGY

## 2020-08-12 ENCOUNTER — HOSPITAL ENCOUNTER (OUTPATIENT)
Dept: GENERAL RADIOLOGY | Age: 45
Discharge: HOME | End: 2020-08-12
Payer: COMMERCIAL

## 2020-08-12 PROCEDURE — 72040 X-RAY EXAM NECK SPINE 2-3 VW: CPT

## 2020-08-12 PROCEDURE — 72100 X-RAY EXAM L-S SPINE 2/3 VWS: CPT

## 2020-09-25 ENCOUNTER — HOSPITAL ENCOUNTER (OUTPATIENT)
Dept: CT IMAGING | Age: 45
Discharge: HOME | End: 2020-09-25
Payer: COMMERCIAL

## 2020-09-25 PROCEDURE — 72131 CT LUMBAR SPINE W/O DYE: CPT

## 2021-02-18 ENCOUNTER — TELEPHONE (OUTPATIENT)
Dept: FAMILY MEDICINE CLINIC | Age: 46
End: 2021-02-18

## 2021-02-18 ENCOUNTER — OFFICE VISIT (OUTPATIENT)
Dept: FAMILY MEDICINE CLINIC | Age: 46
End: 2021-02-18
Payer: COMMERCIAL

## 2021-02-18 VITALS
BODY MASS INDEX: 20.83 KG/M2 | OXYGEN SATURATION: 98 % | TEMPERATURE: 97.3 F | HEART RATE: 71 BPM | SYSTOLIC BLOOD PRESSURE: 90 MMHG | WEIGHT: 122 LBS | DIASTOLIC BLOOD PRESSURE: 64 MMHG | HEIGHT: 64 IN

## 2021-02-18 DIAGNOSIS — Z00.00 ANNUAL PHYSICAL EXAM: Primary | ICD-10-CM

## 2021-02-18 DIAGNOSIS — F41.8 SITUATIONAL ANXIETY: ICD-10-CM

## 2021-02-18 DIAGNOSIS — Z23 NEED FOR TDAP VACCINATION: ICD-10-CM

## 2021-02-18 DIAGNOSIS — H93.11 CLICKING TINNITUS OF RIGHT EAR: ICD-10-CM

## 2021-02-18 DIAGNOSIS — G43.109 MIGRAINE WITH AURA AND WITHOUT STATUS MIGRAINOSUS, NOT INTRACTABLE: ICD-10-CM

## 2021-02-18 DIAGNOSIS — R19.7 DIARRHEA, UNSPECIFIED TYPE: ICD-10-CM

## 2021-02-18 DIAGNOSIS — R53.82 CHRONIC FATIGUE: ICD-10-CM

## 2021-02-18 PROCEDURE — 99213 OFFICE O/P EST LOW 20 MIN: CPT | Performed by: FAMILY MEDICINE

## 2021-02-18 PROCEDURE — 90471 IMMUNIZATION ADMIN: CPT | Performed by: FAMILY MEDICINE

## 2021-02-18 PROCEDURE — 36415 COLL VENOUS BLD VENIPUNCTURE: CPT | Performed by: FAMILY MEDICINE

## 2021-02-18 PROCEDURE — 90715 TDAP VACCINE 7 YRS/> IM: CPT | Performed by: FAMILY MEDICINE

## 2021-02-18 PROCEDURE — 99396 PREV VISIT EST AGE 40-64: CPT | Performed by: FAMILY MEDICINE

## 2021-02-18 RX ORDER — ELETRIPTAN HYDROBROMIDE 40 MG/1
TABLET, FILM COATED ORAL
Qty: 6 TABLET | Refills: 12 | Status: SHIPPED | OUTPATIENT
Start: 2021-02-18 | End: 2022-02-23

## 2021-02-18 NOTE — PROGRESS NOTES
facility-administered medications for this visit. Patient's past medical history,surgical history, family history, medications,  and allergies  were all reviewed and updated as appropriate today. Review of Systems  As abv    Physical Exam  Constitutional:       General: She is not in acute distress. Appearance: Normal appearance. She is well-developed. She is not ill-appearing. HENT:      Head: Normocephalic and atraumatic. Right Ear: Tympanic membrane, ear canal and external ear normal.      Left Ear: Tympanic membrane, ear canal and external ear normal.      Ears:      Comments: Mod L ear canal wax  Eyes:      General: No scleral icterus. Right eye: No discharge. Left eye: No discharge. Extraocular Movements: Extraocular movements intact. Conjunctiva/sclera: Conjunctivae normal.   Neck:      Musculoskeletal: Normal range of motion and neck supple. Vascular: No carotid bruit. Comments: Neg TMG  Cardiovascular:      Rate and Rhythm: Normal rate and regular rhythm. Pulses: Normal pulses. Heart sounds: Normal heart sounds. No murmur. Pulmonary:      Effort: Pulmonary effort is normal. No respiratory distress. Breath sounds: Normal breath sounds. Abdominal:      General: Bowel sounds are normal. There is no distension. Palpations: Abdomen is soft. There is no mass. Tenderness: There is no abdominal tenderness. Musculoskeletal: Normal range of motion. Right lower leg: No edema. Left lower leg: No edema. Lymphadenopathy:      Cervical: No cervical adenopathy. Skin:     General: Skin is warm and dry. Capillary Refill: Capillary refill takes less than 2 seconds. Coloration: Skin is not jaundiced or pale. Findings: No rash. Neurological:      General: No focal deficit present. Mental Status: She is alert and oriented to person, place, and time. Cranial Nerves: No cranial nerve deficit.       Deep Tendon Reflexes: Reflexes are normal and symmetric. Psychiatric:         Behavior: Behavior normal.         Thought Content: Thought content normal.         Judgment: Judgment normal.      Comments: Tearful at times during visit, depressed appearing           BP 90/64   Pulse 71   Temp 97.3 °F (36.3 °C) (Temporal)   Ht 5' 4\" (1.626 m)   Wt 122 lb (55.3 kg)   SpO2 98%   BMI 20.94 kg/m²     Assessment/Plan:    Amada Centeno was seen today for follow-up and annual exam.    Diagnoses and all orders for this visit:    Annual physical exam  -     Comprehensive Metabolic Panel  -     CBC Auto Differential  -     TSH WITH REFLEX TO FT4  -     Lipid Panel  -     HIV Screen  -     Hepatitis C Antibody   Routine exercise applauded    Chronic fatigue  -     Comprehensive Metabolic Panel  -     CBC Auto Differential  -     TSH WITH REFLEX TO FT4   likley 2/2 depression and interrupted sleep 2/2 chronic back pain. Migraine with aura and without status migrainosus, not intractable  -     eletriptan (RELPAX) 40 MG tablet; 1 po at onset of migraine. May repeat in 2 hours if necessary. Max 4/85 hr.    Clicking tinnitus of right ear  -     Tommie Meza MD, Otolaryngology, [facility]    Need for Tdap vaccination  -     Tdap (age 6y and older) IM (BOOSTRIX)    Diarrhea, unspecified type  -     Comprehensive Metabolic Panel  -     CBC Auto Differential   Possibly 2/2 stress/anxiety, though there may be a component of excess dietary fiber. Pt is encouraged to expand diet, consider probiotic. Situational anxiety and depression   Pt hopes to avoid meds. Pt cont's to struggle in unhappy marriage. Sons are close w/their dad and pt fears they would not forgive her for breaking up family if she were to leave. Marcus Og is 15, feels that once boys are out of house, she will live with but independent of her .

## 2021-02-19 LAB
A/G RATIO: 2 (ref 1.1–2.2)
ALBUMIN SERPL-MCNC: 4.6 G/DL (ref 3.4–5)
ALP BLD-CCNC: 41 U/L (ref 40–129)
ALT SERPL-CCNC: 18 U/L (ref 10–40)
ANION GAP SERPL CALCULATED.3IONS-SCNC: 9 MMOL/L (ref 3–16)
AST SERPL-CCNC: 18 U/L (ref 15–37)
BASOPHILS ABSOLUTE: 0 K/UL (ref 0–0.2)
BASOPHILS RELATIVE PERCENT: 0.7 %
BILIRUB SERPL-MCNC: 0.9 MG/DL (ref 0–1)
BUN BLDV-MCNC: 16 MG/DL (ref 7–20)
CALCIUM SERPL-MCNC: 9.9 MG/DL (ref 8.3–10.6)
CHLORIDE BLD-SCNC: 103 MMOL/L (ref 99–110)
CHOLESTEROL, TOTAL: 159 MG/DL (ref 0–199)
CO2: 29 MMOL/L (ref 21–32)
CREAT SERPL-MCNC: 0.7 MG/DL (ref 0.6–1.1)
EOSINOPHILS ABSOLUTE: 0 K/UL (ref 0–0.6)
EOSINOPHILS RELATIVE PERCENT: 1 %
GFR AFRICAN AMERICAN: >60
GFR NON-AFRICAN AMERICAN: >60
GLOBULIN: 2.3 G/DL
GLUCOSE BLD-MCNC: 86 MG/DL (ref 70–99)
HCT VFR BLD CALC: 41.2 % (ref 36–48)
HDLC SERPL-MCNC: 82 MG/DL (ref 40–60)
HEMOGLOBIN: 13.9 G/DL (ref 12–16)
HEPATITIS C ANTIBODY INTERPRETATION: NORMAL
HIV AG/AB: NORMAL
HIV ANTIGEN: NORMAL
HIV-1 ANTIBODY: NORMAL
HIV-2 AB: NORMAL
LDL CHOLESTEROL CALCULATED: 68 MG/DL
LYMPHOCYTES ABSOLUTE: 1.2 K/UL (ref 1–5.1)
LYMPHOCYTES RELATIVE PERCENT: 24.8 %
MCH RBC QN AUTO: 31.3 PG (ref 26–34)
MCHC RBC AUTO-ENTMCNC: 33.7 G/DL (ref 31–36)
MCV RBC AUTO: 93 FL (ref 80–100)
MONOCYTES ABSOLUTE: 0.3 K/UL (ref 0–1.3)
MONOCYTES RELATIVE PERCENT: 5.7 %
NEUTROPHILS ABSOLUTE: 3.4 K/UL (ref 1.7–7.7)
NEUTROPHILS RELATIVE PERCENT: 67.8 %
PDW BLD-RTO: 13 % (ref 12.4–15.4)
PLATELET # BLD: 198 K/UL (ref 135–450)
PLATELET SLIDE REVIEW: ABNORMAL
PMV BLD AUTO: 11.8 FL (ref 5–10.5)
POTASSIUM SERPL-SCNC: 4.2 MMOL/L (ref 3.5–5.1)
RBC # BLD: 4.43 M/UL (ref 4–5.2)
RBC # BLD: NORMAL 10*6/UL
SODIUM BLD-SCNC: 141 MMOL/L (ref 136–145)
TOTAL PROTEIN: 6.9 G/DL (ref 6.4–8.2)
TRIGL SERPL-MCNC: 47 MG/DL (ref 0–150)
TSH REFLEX FT4: 1.76 UIU/ML (ref 0.27–4.2)
VLDLC SERPL CALC-MCNC: 9 MG/DL
WBC # BLD: 5 K/UL (ref 4–11)

## 2021-03-07 NOTE — PROGRESS NOTES
in the management of tinnitus symptoms. - If medically indicated, consider vestibular evaluation to further investigate symptoms of dizziness.        Kalpesh Miller  Audiologist    Chart CC'd to: Betty Pradhan MD      Degree of   Hearing Sensitivity dB Range   Within Normal Limits (WNL) 0 - 20   Mild 20 - 40   Moderate 40 - 55   Moderately-Severe 55 - 70   Severe 70 - 90   Profound 90 +

## 2021-03-09 ENCOUNTER — OFFICE VISIT (OUTPATIENT)
Dept: ENT CLINIC | Age: 46
End: 2021-03-09
Payer: COMMERCIAL

## 2021-03-09 ENCOUNTER — PROCEDURE VISIT (OUTPATIENT)
Dept: AUDIOLOGY | Age: 46
End: 2021-03-09
Payer: COMMERCIAL

## 2021-03-09 VITALS
HEIGHT: 65 IN | DIASTOLIC BLOOD PRESSURE: 61 MMHG | SYSTOLIC BLOOD PRESSURE: 96 MMHG | TEMPERATURE: 97.9 F | HEART RATE: 74 BPM | BODY MASS INDEX: 20.33 KG/M2 | WEIGHT: 122 LBS

## 2021-03-09 DIAGNOSIS — H71.92 CHOLESTEATOMA OF EAR, LEFT: Primary | ICD-10-CM

## 2021-03-09 DIAGNOSIS — H72.91 TYMPANIC MEMBRANE PERFORATION, RIGHT: ICD-10-CM

## 2021-03-09 DIAGNOSIS — R42 DIZZINESS AND GIDDINESS: ICD-10-CM

## 2021-03-09 DIAGNOSIS — H93.13 TINNITUS, BILATERAL: Primary | ICD-10-CM

## 2021-03-09 DIAGNOSIS — H90.0 CONDUCTIVE HEARING LOSS, BILATERAL: ICD-10-CM

## 2021-03-09 PROCEDURE — 99213 OFFICE O/P EST LOW 20 MIN: CPT | Performed by: OTOLARYNGOLOGY

## 2021-03-09 PROCEDURE — 92557 COMPREHENSIVE HEARING TEST: CPT | Performed by: AUDIOLOGIST

## 2021-03-09 PROCEDURE — 31231 NASAL ENDOSCOPY DX: CPT | Performed by: OTOLARYNGOLOGY

## 2021-03-09 RX ORDER — AMOXICILLIN 250 MG
CAPSULE ORAL
COMMUNITY
Start: 2020-09-15

## 2021-03-09 NOTE — PROGRESS NOTES
[address] UP VISIT      Patient Name: Susy Villa  Medical Record Number:  [id number]  Primary Care Physician:  Kitty Mason MD    ChiefComplaint     Chief Complaint   Patient presents with    Otalgia     Pt states her left ear seems to be always hurting and she has hearing loss bilaterally. Pt states her right ear has been having some tinnitus and pain. no complaints of drainage from either ear. History of Present Illness     Susy Villa is an 41 y.o. female previously seen for chronic middle ear disease. Prior otologic history:  Left ear: Prior history of left tympanoplasty 12/2018 due to left tympanic membrane central perforation - has had prior tympanoplasty of the left ear 12/2017  Right ear: History of cholesteatoma at age 16-14 with right ear canal wall up tympanomastoidectomy    Interval History 3/9/2021: Patient states that for the past year she has had the feeling of \"wetness\" in the left ear however no cristal otorrhea, along with intermittent throbbing pain (2-3/10) localized to the ear without radiation. States hearing loss bilaterally along with \"crunching\" (?tinnitus) sound in the contralateral (right) ear. Has bilateral hearing loss at baseline. Of further concern, she has had several episodes of vertigo over the prior two years - one lasting 7-10 days after her tympanoplasty 12/2018, and another approximately 1 week ago lasting 24-48h. Not accompanied by roaring tinnitus or acute hearing loss. Past Medical History     Past Medical History:   Diagnosis Date    Allergy history unknown     Anxiety     Bulimia     Dental crowns present     molar    Depression     Has been dx'd with bipolar 2, ocd, personality d/o as well. Unclear what dx is correct.     Dizziness     Fibromyalgia     Hearing loss     Migraine     b/l    Perforation of tympanic membrane     Dr Miranda Narayan, multipe perforations Past Surgical History     Past Surgical History:   Procedure Laterality Date    BREAST IMPLANT REMOVAL  01/2014    adjustment  to implants    BREAST SURGERY  2013    augmentation    DILATION AND CURETTAGE OF UTERUS      INNER EAR SURGERY Right     mastoidectomy, age 16 or 16, had cholesteocytoma    MASTOIDECTOMY Right     TONSILLECTOMY      TYMPANIC MEMBRANE REPAIR Right     TYMPANOPLASTY Left 12/15/2017    LEFT TYMPANOPLASTY                        TYMPANOPLASTY Left 12/14/2018    POST-AURICULAR TYMPANOPLASTY WITH LATERAL PLACEMENT OF TEMPORALIS FASCIA GRAFT performed by Lucrecia Hicks MD at [facility] OR       Family History     Family History   Problem Relation Age of Onset    Other Mother         fibromyalgia    Mult Sclerosis Sister        Social History     Social History     Tobacco Use    Smoking status: Never Smoker    Smokeless tobacco: Never Used   Substance Use Topics    Alcohol use: No    Drug use: No        Allergies     Allergies   Allergen Reactions    Codeine Nausea Only    Flagyl [Metronidazole]      Tongue either swelled or felt odd    Ultram [Tramadol Hcl] Nausea Only    Celebrex [Celecoxib] Itching and Rash    Darvocet [Propoxyphene N-Acetaminophen] Nausea And Vomiting    Percocet [Oxycodone-Acetaminophen] Nausea And Vomiting    Vicodin [Hydrocodone-Acetaminophen] Nausea And Vomiting       Medications     Current Outpatient Medications   Medication Sig Dispense Refill    Cobalamin Combinations (B-12) 100-5000 MCG SUBL B12      eletriptan (RELPAX) 40 MG tablet 1 po at onset of migraine. May repeat in 2 hours if necessary. Max 2/24 hr. 6 tablet 12     No current facility-administered medications for this visit.         Review of Systems     REVIEW OF SYSTEMS  The following systems were reviewed and revealed the following in addition to any already discussed in the HPI:    CONSTITUTIONAL: no weight loss, no fever, no night sweats, no chills  EYES: no vision changes, no blurry vision  EARS: no changes in hearing, +otalgia  NOSE: no epistaxis, no rhinorrhea  RESPIRATORY: no difficulty breathing, no shortness of breath  CV: no chest pain, no peripheral vascular disease  HEME: No coagulation disorder, no bleeding disorder  NEURO: No TIA or stroke-like symptoms  SKIN: No new rashes in the head and neck, no recent skin cancers  MOUTH: No new ulcers, no recent teeth infections  GASTROINTESTINAL: No diarrhea, stomach pain  PSYCH: +anxiety, no depression    PhysicalExam     Vitals:    03/09/21 1015   BP: 96/61   Site: Right Upper Arm   Position: Sitting   Cuff Size: Medium Adult   Pulse: 74   Temp: 97.9 °F (36.6 °C)   TempSrc: Infrared   Weight: 122 lb (55.3 kg)   Height: 5' 5\" (1.651 m)       PHYSICAL EXAM  BP 96/61 (Site: Right Upper Arm, Position: Sitting, Cuff Size: Medium Adult)   Pulse 74   Temp 97.9 °F (36.6 °C) (Infrared)   Ht 5' 5\" (1.651 m)   Wt 122 lb (55.3 kg)   BMI 20.30 kg/m²     GENERAL: No Acute Distress, Alert and Oriented, no hoarseness  EYES: EOMI, Anti-icteric  NOSE: On anterior rhinoscopy there is no epistaxis, nasal mucosa within normal limits, no purulent drainage  EARS: Normal external appearance; on portable otomicroscopy:  -Right ear: External auditory canal occluded with cerumen  -Left ear: External auditory canal without stenosis, tympanic membrane with significant myringosclerosis in the inferior aspect, malleus manubrium noted, perforation in the ana m-inferior quadrant (10-15%)  FACE: 1/6 House-Brackmann Scale, symmetric, sensation equal bilaterally  ORAL CAVITY: No masses or lesions palpated, uvula is midline, moist mucous membranes, 0+ tonsils, good dentition  NECK: Normal range of motion, no thyromegaly, trachea is midline, no lymphadenopathy, no neck masses, no crepitus  CHEST: Normal respiratory effort, no retractions, breathing comfortably  SKIN: No rashes, normal appearing skin, no evidence of skin lesions/tumors  NEURO: CN 2, 3, 4, 5, 6, 7, 11, 12 intact bilaterally          Procedure     Binocular Otomicroscopy     Pre op: Left ear otorrhea  Post op: Left ear cholesteatoma    Procedure : Binocular otomicroscopy  Surgeon: RUSSELL Wilks  Estimated Blood Loss: None    After obtaining consent, the patient was placed in the examination chair in the reclined position.      -Right ear: External auditory canal with stenosis, tympanic membrane showing intact malleus manubrium however myringosclerosis in the anterior-inferior aspect with 10-15% central perforation, no epithelial ingrowth/cholesteatoma noted, middle ear otherwise clear  -Left ear: External auditory canal with no stenosis, tympanic membrane showing 30-40% central perforation, middle ear with eroded malleus manubrium and cristal cholesteatoma medial to this over the promontory. We could not appreciate the incudostapedial joint due to tympanic membrane opacification     * Patient tolerated the procedure well with no complications    Nasal Endoscopy    Pre OP: Chronic middle ear disease,? Eustachian tube dysfunction  Post OP: Eustachian tubes and nasopharynx without obstruction, nasal passages without pathology    Verbal consent was received. After topical anesthesia and decongestion had been obtained using aerosolized 1% lidocaine and oxymetazoline, a 45 degree rigid endoscope was placed into both nares with the patient in a sitting position.  The following was observed:    Right Nasal Cavity and Paranasal Sinuses:  Polyp score = 0 (0 = no polyps, 1 = small polyps in middle meatus not reaching below the inferior border of the middle bailey, 2 = polyps reaching below the middle border of the middle turbinate, 3= large polyps reaching the lower border of the inferior turbinate or polyps medial to the middle bailey, 4= large polyps causing almost complete congestion/obstruction of the interior meatus)  Edema score = 0 (0 = absent, 1 = mild, 2 = severe)  Discharge score = 0 (0 = no discharge, 1 = clear thin discharge, 2 = thick purulent discharge)    Left Nasal Cavity and Paranasal Sinuses:    Polyp score = 0 (0 = no polyps, 1 = small polyps in middle meatus not reaching below the inferior border of the middle bailey, 2 = polyps reaching below the middle border of the middle turbinate, 3= large polyps reaching the lower border of the inferior turbinate or polyps medial to the middle bailey, 4= large polyps causing almost complete congestion/obstruction of the interior meatus)  Edema score = 0 (0 = absent, 1 = mild, 2 = severe)  Discharge score = 0 (0 = no discharge, 1 = clear thin discharge, 2 = thick purulent discharge)    Nasopharynx:     Eustachean tube orifices, Fossae of Rosenmuller and posterior nasopharyngeal wall clear without masses    Septum: intact without deviation, right bony septal spur  Other: The inferior and middle turbinates were examined. The middle meatus, and sphenoethmoid recess was examined bilaterally. Cultures were not obtained from the nasal passages. There were no complications. Tolerated well without complication. I attest that I was present for and did the entire procedure myself. Assessment and Plan     1. Cholesteatoma of ear, left  Patient with history of chronic middle ear disease, has had 2 prior tympanoplasty procedures most recently 12/2018. Of concern she has had chronic \"wetness\" with throbbing pain in the left ear, with 2 episodes of vertigo spaced approximately 1.5 years apart. On examination we appreciate cristal cholesteatoma the left ear between the promontory and the malleus manubrium which appears eroded. Audiometric testing shows his hearing loss in the left ear, sensorineural hearing loss in the right ear. Due to concern for vertigo we will order CT temporal bone to rule out erosion of the vestibular semicircular canals. Once this has been assessed, will call the patient to discuss our surgical plan. - CT IAC POSTERIOR FOSSA WO CONTRAST; Future    2. Tympanic membrane perforation, right  Small right anterior-inferior tympanic membrane perforation, will discuss repairing at a future date. Return in about 4 weeks (around 4/6/2021). Otoniel Padilla MD  [facility]  Department of Otolaryngology/Head and Neck Surgery  3/9/21    I have performed a head and neck physical exam personally or was physically present during the key or critical portions of the service. Medical Decision Making: The following items were considered in medical decision making:  Independent review of images  Review / order clinical lab tests  Review / order radiology tests  Decision to obtain old records  Review and summation of old records as accessed through [facility] (a summary of my findings in these old records: None)     Portions of this note were dictated using Dragon.  There may be linguistic errors secondary to the use of this program.

## 2021-03-09 NOTE — PATIENT INSTRUCTIONS
directly into a persons ear      Some additional items that may be helpful:   - Remain patient - this is important for both parties   - Write down items that still cannot be heard/understood. You may write with pen/paper or consider typing/texting on a cell phone or smart device. - If background noise is unavoidable, try to keep yourself in a good position in the room. By sitting at a ramirez on the side of the restaurant (preferably a corner), it will be easier to communicate than if you were sitting at a table in the middle with background noise surrounding you. Keep yourself positioned away from music speakers or heavy foot traffic. Tinnitus: Overview and Management Strategies          Many people have some ringing sounds in their ears once in a while. You may hear a roar, a hiss, a tinkle, or a buzz. The sound usually lasts only a few minutes. If it goes on all the time, you may have tinnitus. Tinnitus is usually caused by long-term exposure to loud noise. This damages the nerves in the inner ear. It can occur with all types of hearing loss. It may be a symptom of almost any ear problem. Tinnitus may be caused by a buildup of earwax. Or, it may be caused by ear infections or certain medicines (especially antibiotics or large amounts of aspirin). You can also hear noises in your ears because of an injury to the ears, drinking too much alcohol or caffeine, or a medical condition. Other conditions may also contribute to tinnitus, including: head and neck trauma, temporomandibular joint disorder (TMJ), sinus pressure and barometric trauma, traumatic brain injury, metabolic disorders, autoimmune disorders, stress, and high blood pressure. You may need tests to evaluate your hearing and to find causes of long-lasting tinnitus. Your doctor may suggest one or more treatments to help you cope with the tinnitus. You can also do things at home to help reduce symptoms.     Follow-up care is a key part of your treatment and safety. Be sure to make and go to all appointments, and call your doctor if you are having problems. It's also a good idea to know your test results and keep a list of the medicines you take. How can you care for yourself at home? · Limit or cut out alcohol, caffeine, and sodium. They can make your symptoms worse. · Do not smoke or use other tobacco products. Nicotine reduces blood flow to the ear and makes tinnitus worse. If you need help quitting, talk to your doctor about stop-smoking programs and medicines. These can increase your chances of quitting for good. · Talk to your doctor about whether to stop taking aspirin and similar products such as ibuprofen or naproxen. · Get exercise often. It can help improve blood flow to the ear. Ways to manage/cope with tinnitus  Some tinnitus may last a long time. To manage your tinnitus, try to:  · Avoid noises that you think caused your tinnitus. If you can't avoid loud noises, wear earplugs or earmuffs. · Ignore the sound by paying attention to other things. Keeping your brain busy with other tasks or background noise can help your brain not focus on the tinnitus. · Try to not give the tinnitus an emotional reaction. Do your best to ignore the sound and not let it bother you. Relax using biofeedback, meditation, or yoga. Feeling stressed and being tired can make tinnitus worse. · Play music or white noise to help you sleep. Background noise may cover up the noise that you hear in your ears. You can buy a tabletop machine or a device that sits under your pillow to play soothing sounds, like ocean waves. · Smart phones have free apps, such as Whist, Relax Melodies, ReSound Relief, and White Noise Lite. These apps have different types of sounds/noise, some of which you can blend together to find sounds that are most soothing to you.   · Hearing aid technology, especially when there is some hearing loss, may help reduce tinnitus symptoms by giving your brain better access to the sounds it is missing. There are some hearing aids with built-in noise generator programs, which may help when amplification alone is not enough. Additional resources may be found through the [employer] Tinn[employer] Association at www.darius.org    When should you call for help? Call 911 anytime you think you may need emergency care. For example, call if:    · You have symptoms of a stroke. These may include:  ? Sudden numbness, tingling, weakness, or loss of movement in your face, arm, or leg, especially on only one side of your body. ? Sudden vision changes. ? Sudden trouble speaking. ? Sudden confusion or trouble understanding simple statements. ? Sudden problems with walking or balance. ? A sudden, severe headache that is different from past headaches. Call your doctor now or seek immediate medical care if:    · You develop other symptoms. These may include hearing loss (or worse hearing loss), balance problems, dizziness, nausea, or vomiting. Watch closely for changes in your health, and be sure to contact your doctor if:    · Your tinnitus moves from both ears to one ear. · Your hearing loss gets worse within 1 day after an ear injury. · Your tinnitus or hearing loss does not get better within 1 week after an ear injury. · Your tinnitus bothers you enough that you want to take medicines to help you cope with it. If you notice changes in your tinnitus and/or your hearing, it is recommended that you have your hearing tested by your audiologist and to follow-up with your physician that manages your hearing loss (such as your ENT or Primary Care doctor). Dizziness: Care Instructions  Your Care Instructions  Dizziness is the feeling of unsteadiness or fuzziness in your head. It is different than having vertigo, which is a feeling that the room is spinning or that you are moving or falling.  It is also different from lightheadedness, which is the feeling that you are about to faint. It can be hard to know what causes dizziness. Some people feel dizzy when they have migraine headaches. Sometimes bouts of flu can make you feel dizzy. Some medical conditions, such as heart problems or high blood pressure, can make you feel dizzy. Many medicines can cause dizziness, including medicines for high blood pressure, pain, or anxiety. If a medicine causes your symptoms, your doctor may recommend that you stop or change the medicine. If it is a problem with your heart, you may need medicine to help your heart work better. If there is no clear reason for your symptoms, your doctor may suggest watching and waiting for a while to see if the dizziness goes away on its own. Follow-up care is a key part of your treatment and safety. Be sure to make and go to all appointments, and call your doctor if you are having problems. It's also a good idea to know your test results and keep a list of the medicines you take. How can you care for yourself at home? · If your doctor recommends or prescribes medicine, take it exactly as directed. Call your doctor if you think you are having a problem with your medicine. · Do not drive while you feel dizzy. · Try to prevent falls. Steps you can take include:  ? Using nonskid mats, adding grab bars near the tub, and using night-lights. ? Clearing your home so that walkways are free of anything you might trip on.  ? Letting family and friends know that you have been feeling dizzy. This will help them know how to help you. When should you call for help? Call 911 anytime you think you may need emergency care. For example, call if:    · You passed out (lost consciousness). · You have dizziness along with symptoms of a heart attack. These may include:  ? Chest pain or pressure, or a strange feeling in the chest.  ? Sweating. ? Shortness of breath. ? Nausea or vomiting.   ? Pain, pressure, or a strange feeling in the back, neck, jaw, or upper belly or in one or both shoulders or arms. ? Lightheadedness or sudden weakness. ? A fast or irregular heartbeat. · You have symptoms of a stroke. These may include:  ? Sudden numbness, tingling, weakness, or loss of movement in your face, arm, or leg, especially on only one side of your body. ? Sudden vision changes. ? Sudden trouble speaking. ? Sudden confusion or trouble understanding simple statements. ? Sudden problems with walking or balance. ? A sudden, severe headache that is different from past headaches. Call your doctor now or seek immediate medical care if:    · You feel dizzy and have a fever, headache, or ringing in your ears. · You have new or increased nausea and vomiting. · Your dizziness does not go away or comes back. Watch closely for changes in your health, and be sure to contact your doctor if:    · You do not get better as expected. Where can you learn more? Go to [email or web address]://[employer]. org and sign in to your [employer] account. Enter S166 in the [employer] box to learn more about \"Dizziness: Care Instructions. \"     If you do not have an account, please click on the \"Sign Up Now\" link. Current as of: September 23, 2018  Content Version: 11.9  © 6151-7902 [employer]. Care instructions adapted under license by [facility] ([facility]). If you have questions about a medical condition or this instruction, always ask your healthcare professional. [place] [address] any warranty or liability for your use of this information.

## 2021-03-09 NOTE — PATIENT INSTRUCTIONS
-Dry ear precautions recommended as follows:   Patient to place a silicone ear plug or cotton ball coated with Vaseline into both ears during showering, instructed to remove afterwards to aerate ears   Patient instructed to avoid digital trauma to the ears - do not use q-tips to clean ears   Instructed to notify the clinic immediately with any acute otalgia, hearing loss, purulent otorrhea

## 2021-03-09 NOTE — Clinical Note
Kateryna tahkkar is well. Manjula Kim was seen in my office today-she is a patient with cholesteatoma as an adolescent with right-sided tympanomastoidectomy, however most recently she had tympanoplasties performed by you in 2017 and 2018. 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 She is also complained of 2 episodes of vertigo, and I am ordering a CT temporal bone to rule out erosion of the vestibule of the semicircular canals. I think she will need surgery-likely a revision tympanoplasty with possible mastoidectomy and ossicular chain reconstruction. Let me know if you would like to perform this through your office, or if I should go ahead and offer her the surgery.   If she has erosion of the vestibule/semicircular canals, we discussed sending her to  to be evaluated by a neurootologist.    Palak Tobin

## 2021-03-09 NOTE — Clinical Note
Dr. Joya Kaufman,    Thank you for your referral for audiometric testing on this patient. Please see the scanned audiogram (under \"Media\" tab) and encounter note for details. If you have any questions, or if there is anything else you need, please let me know.       Kalpesh Pereyra  Audiologist  ---  [id number] - Audiology

## 2021-03-10 ENCOUNTER — TELEPHONE (OUTPATIENT)
Dept: ENT CLINIC | Age: 46
End: 2021-03-10

## 2021-03-10 NOTE — TELEPHONE ENCOUNTER
The patient does not need contrast for this study as I'm evaluating the bony anatomy of the temporal bone.

## 2021-03-15 ENCOUNTER — HOSPITAL ENCOUNTER (OUTPATIENT)
Dept: CT IMAGING | Age: 46
Discharge: HOME | End: 2021-03-15
Payer: COMMERCIAL

## 2021-03-15 DIAGNOSIS — H71.92 CHOLESTEATOMA OF EAR, LEFT: ICD-10-CM

## 2021-03-15 PROCEDURE — 70480 CT ORBIT/EAR/FOSSA W/O DYE: CPT

## 2021-03-16 ENCOUNTER — TELEPHONE (OUTPATIENT)
Dept: ENT CLINIC | Age: 46
End: 2021-03-16

## 2021-03-16 DIAGNOSIS — H71.92 CHOLESTEATOMA OF EAR, LEFT: Primary | ICD-10-CM

## 2021-03-16 NOTE — TELEPHONE ENCOUNTER
Reviewed CT imaging - concern for squamous epithelium ingrowth over the oval window (see above coronal image). Will refer patient to the [facility] for further evaluation. Patient in agreement with this plan.

## 2021-04-07 ENCOUNTER — TELEPHONE (OUTPATIENT)
Dept: FAMILY MEDICINE CLINIC | Age: 46
End: 2021-04-07

## 2021-04-07 DIAGNOSIS — F33.2 MDD (MAJOR DEPRESSIVE DISORDER), RECURRENT SEVERE, WITHOUT PSYCHOSIS (HCC): ICD-10-CM

## 2021-04-07 RX ORDER — DESVENLAFAXINE 50 MG/1
50 TABLET, EXTENDED RELEASE ORAL DAILY
Qty: 30 TABLET | Refills: 12 | Status: SHIPPED | OUTPATIENT
Start: 2021-04-07 | End: 2021-06-01

## 2021-04-07 NOTE — TELEPHONE ENCOUNTER
Call from patient requesting to go back on her depression mediation. She can not remember the name of he medication. She stated she has continual depression and is needing the medication. Patient stated she discussed with provider at last visit. Send to [facility]    Last seen:  2.18.21    Please advise.

## 2021-04-07 NOTE — TELEPHONE ENCOUNTER
Pls let her know that I'm pleased to see she is willing to resume medication. I sent in pristiq 50 mg 1qd to her [facility]. After 1 mo, if she does not notice improvement, please have her call and I'll increase dose to 100 mg qd. Hope she feels better soon.

## 2021-06-01 ENCOUNTER — OFFICE VISIT (OUTPATIENT)
Dept: FAMILY MEDICINE CLINIC | Age: 46
End: 2021-06-01
Payer: COMMERCIAL

## 2021-06-01 ENCOUNTER — NURSE TRIAGE (OUTPATIENT)
Dept: OTHER | Facility: CLINIC | Age: 46
End: 2021-06-01

## 2021-06-01 VITALS
WEIGHT: 125 LBS | DIASTOLIC BLOOD PRESSURE: 64 MMHG | BODY MASS INDEX: 20.8 KG/M2 | HEART RATE: 85 BPM | OXYGEN SATURATION: 98 % | SYSTOLIC BLOOD PRESSURE: 100 MMHG

## 2021-06-01 DIAGNOSIS — R10.84 GENERALIZED ABDOMINAL PAIN: Primary | ICD-10-CM

## 2021-06-01 PROCEDURE — 99213 OFFICE O/P EST LOW 20 MIN: CPT | Performed by: NURSE PRACTITIONER

## 2021-06-01 ASSESSMENT — ENCOUNTER SYMPTOMS
DIARRHEA: 0
ABDOMINAL PAIN: 1
ABDOMINAL DISTENTION: 1
CONSTIPATION: 0

## 2021-06-10 ENCOUNTER — TELEPHONE (OUTPATIENT)
Dept: FAMILY MEDICINE CLINIC | Age: 46
End: 2021-06-10

## 2021-06-10 DIAGNOSIS — R14.1 GAS PAIN: ICD-10-CM

## 2021-06-10 DIAGNOSIS — R14.0 BLOATING: Primary | ICD-10-CM

## 2021-07-02 ENCOUNTER — HOSPITAL ENCOUNTER (OUTPATIENT)
Dept: MAMMOGRAPHY | Age: 46
Discharge: HOME | End: 2021-07-07
Payer: COMMERCIAL

## 2021-07-02 VITALS — BODY MASS INDEX: 19.99 KG/M2 | HEIGHT: 65 IN | WEIGHT: 120 LBS

## 2021-07-02 DIAGNOSIS — Z12.31 VISIT FOR SCREENING MAMMOGRAM: ICD-10-CM

## 2021-07-02 PROCEDURE — 77067 SCR MAMMO BI INCL CAD: CPT

## 2021-09-02 ENCOUNTER — TELEPHONE (OUTPATIENT)
Dept: FAMILY MEDICINE CLINIC | Age: 46
End: 2021-09-02

## 2021-09-02 ENCOUNTER — NURSE TRIAGE (OUTPATIENT)
Dept: OTHER | Facility: CLINIC | Age: 46
End: 2021-09-02

## 2021-09-02 NOTE — TELEPHONE ENCOUNTER
Reason for Disposition   Symptoms interfere with work or school    Answer Assessment - Initial Assessment Questions  1. CONCERN: \"What happened that made you call today? \"      Concerned about eating a lot of sugar- excessive eating at night \"binge eating\"     2. ANXIETY SYMPTOM SCREENING: \"Can you describe how you have been feeling? \"  (e.g., tense, restless, panicky, anxious, keyed up, trouble sleeping, trouble concentrating)      Feels irritable,- restless due to pack pain   \"Worthless\" trouble sleeping due to neck and back pain     3. ONSET: \"How long have you been feeling this way? \"      Depression - \"my whole life\"   Eating disorder - \" the binging is fairly new- a few months\" - has gotten worse since I got my taste back after COVID     4. RECURRENT: \"Have you felt this way before? \"  If yes: \"What happened that time? \" \"What helped these feelings go away in the past?\"       Lifetime issues   - eating problems since COVID - has not seen PCP for this problem     5. RISK OF HARM - SUICIDAL IDEATION:  \"Do you ever have thoughts of hurting or killing yourself? \"  (e.g., yes, no, no but preoccupation with thoughts about death)    - INTENT:  \"Do you have thoughts of hurting or killing yourself right NOW? \" (e.g., yes, no, N/A)    - PLAN: \"Do you have a specific plan for how you would do this? \" (e.g., gun, knife, overdose, no plan, N/A)     Denies     6. RISK OF HARM - HOMICIDAL IDEATION:  \"Do you ever have thoughts of hurting or killing someone else? \"  (e.g., yes, no, no but preoccupation with thoughts about death)    - INTENT:  \"Do you have thoughts of hurting or killing someone right NOW? \" (e.g., yes, no, N/A)    - PLAN: \"Do you have a specific plan for how you would do this? \" (e.g., gun, knife, no plan, N/A)       Denies     7. FUNCTIONAL IMPAIRMENT: \"How have things been going for you overall in your life? Have you had any more difficulties than usual doing your normal daily activities? \"  (e.g., better, same, worse; self-care, school, work, interactions)      \" I can't exercise much anymore due to back pain \"   \"I don't like to leave the house anymore\"     8. SUPPORT: \"Who is with you now? \" \"Who do you live with?\" \"Do you have family or friends nearby who you can talk to?\"       Live with  and kids - no friends     5. THERAPIST: \"Do you have a counselor or therapist? Name? \"     No - having issues with payment for counselors     10. STRESSORS: \"Has there been any new stress or recent changes in your life? \"       \"no not really- just feel like I am falling apart- can't feel like I can do what I used to due to my back\"     11. CAFFEINE ABUSE: \"Do you drink caffeinated beverages, and how much each day? \" (e.g., coffee, tea, nadine)        \"to much\" 2 cups of coffee, B12 with caffeine, diet mountain dew     12. SUBSTANCE ABUSE: \"Do you use any illegal drugs or alcohol? \"       Denies     13. OTHER SYMPTOMS: \"Do you have any other physical symptoms right now? \" (e.g., chest pain, palpitations, difficulty breathing, fever)        Denies     14. PREGNANCY: \"Is there any chance you are pregnant? \" \"When was your last menstrual period? \"        Denies - LMP- August 7th    Protocols used: ANXIETY AND PANIC ATTACK-ADULT-OH    Received call from TEZ Croft   at [facility] with Red Flag Complaint. Brief description of triage: pt is concerned about sugar craving and binge eating since COVID diagnosis. Pt wants to discuss the medication: Saxenda injections     Triage indicates for patient to See in office in 3 days. Care advice provided, patient verbalizes understanding; denies any other questions or concerns; instructed to call back for any new or worsening symptoms. Writer provided warm transfer to [facility] for appointment scheduling. Attention Provider: Thank you for allowing me to participate in the care of your patient.   The patient was connected to triage in response to information provided to the ECC.  Please do not respond through this encounter as the response is not directed to a shared pool.

## 2021-09-02 NOTE — TELEPHONE ENCOUNTER
----- Message from [address] sent at 9/2/2021  1:30 PM EDT -----  Subject: Appointment Request    Reason for Call: Urgent Return from RN Triage    QUESTIONS  Type of Appointment? Established Patient  Reason for appointment request? No appointments available during search  Additional Information for Provider? Naima Cornejo is return form [facility] and she is   dealing with some Anxiety she believes that she have a eating disorder   since covid she have been eating foods that high in sugar, she seem to   crave sure alot NT states she needs to be seen in 3 days   ---------------------------------------------------------------------------  --------------  CALL BACK INFO  What is the best way for the office to contact you? OK to leave message on   voicemail  Preferred Call Back Phone Number? [phone]  ---------------------------------------------------------------------------  --------------  SCRIPT ANSWERS  Patient needs to be seen today or tomorrow? Yes   Nurse Name? Nilam Woodward   Have you been diagnosed with, awaiting test results for, or told that you   are suspected of having COVID-19 (Coronavirus)? (If patient has tested   negative or was tested as a requirement for work, school, or travel and   not based on symptoms, answer no)? No  Do you currently have flu-like symptoms including fever or chills, cough,   shortness of breath, difficulty breathing, or new loss of taste or smell? No  Have you had close contact with someone with COVID-19 in the last 14 days? No  (Service Expert  click yes below to proceed with [facility] As Usual   Scheduling)?  Yes

## 2021-09-02 NOTE — TELEPHONE ENCOUNTER
Pls offer any avail appt, but if pt wants to wait for me, pls let her know that I don't think she is in any imminent danger.

## 2021-09-07 ENCOUNTER — CLINICAL DOCUMENTATION (OUTPATIENT)
Dept: PSYCHOLOGY | Age: 46
End: 2021-09-07

## 2021-09-07 ENCOUNTER — OFFICE VISIT (OUTPATIENT)
Dept: FAMILY MEDICINE CLINIC | Age: 46
End: 2021-09-07
Payer: COMMERCIAL

## 2021-09-07 VITALS
WEIGHT: 126 LBS | SYSTOLIC BLOOD PRESSURE: 108 MMHG | BODY MASS INDEX: 20.99 KG/M2 | HEART RATE: 60 BPM | OXYGEN SATURATION: 98 % | DIASTOLIC BLOOD PRESSURE: 64 MMHG | HEIGHT: 65 IN | TEMPERATURE: 98.3 F

## 2021-09-07 DIAGNOSIS — R53.83 FATIGUE, UNSPECIFIED TYPE: ICD-10-CM

## 2021-09-07 DIAGNOSIS — R63.2 POLYPHAGIA: ICD-10-CM

## 2021-09-07 DIAGNOSIS — R63.2 BINGE EATING: Primary | ICD-10-CM

## 2021-09-07 PROBLEM — N60.19 FIBROCYSTIC BREAST CHANGES: Status: ACTIVE | Noted: 2021-09-07

## 2021-09-07 LAB
A/G RATIO: 1.9 (ref 1.1–2.2)
ALBUMIN SERPL-MCNC: 4.7 G/DL (ref 3.4–5)
ALP BLD-CCNC: 50 U/L (ref 40–129)
ALT SERPL-CCNC: 12 U/L (ref 10–40)
ANION GAP SERPL CALCULATED.3IONS-SCNC: 15 MMOL/L (ref 3–16)
AST SERPL-CCNC: 16 U/L (ref 15–37)
BASOPHILS ABSOLUTE: 0 K/UL (ref 0–0.2)
BASOPHILS RELATIVE PERCENT: 0.7 %
BILIRUB SERPL-MCNC: 0.8 MG/DL (ref 0–1)
BUN BLDV-MCNC: 7 MG/DL (ref 7–20)
CALCIUM SERPL-MCNC: 10.1 MG/DL (ref 8.3–10.6)
CHLORIDE BLD-SCNC: 103 MMOL/L (ref 99–110)
CO2: 24 MMOL/L (ref 21–32)
CREAT SERPL-MCNC: 0.7 MG/DL (ref 0.6–1.1)
EOSINOPHILS ABSOLUTE: 0.2 K/UL (ref 0–0.6)
EOSINOPHILS RELATIVE PERCENT: 4.1 %
GFR AFRICAN AMERICAN: >60
GFR NON-AFRICAN AMERICAN: >60
GLOBULIN: 2.5 G/DL
GLUCOSE BLD-MCNC: 83 MG/DL (ref 70–99)
HCT VFR BLD CALC: 38.6 % (ref 36–48)
HEMOGLOBIN: 12.9 G/DL (ref 12–16)
LYMPHOCYTES ABSOLUTE: 1.2 K/UL (ref 1–5.1)
LYMPHOCYTES RELATIVE PERCENT: 30.4 %
MCH RBC QN AUTO: 30.7 PG (ref 26–34)
MCHC RBC AUTO-ENTMCNC: 33.3 G/DL (ref 31–36)
MCV RBC AUTO: 92.1 FL (ref 80–100)
MONOCYTES ABSOLUTE: 0.3 K/UL (ref 0–1.3)
MONOCYTES RELATIVE PERCENT: 7.8 %
NEUTROPHILS ABSOLUTE: 2.2 K/UL (ref 1.7–7.7)
NEUTROPHILS RELATIVE PERCENT: 57 %
PDW BLD-RTO: 13.6 % (ref 12.4–15.4)
PLATELET # BLD: 214 K/UL (ref 135–450)
PMV BLD AUTO: 11.1 FL (ref 5–10.5)
POTASSIUM SERPL-SCNC: 4.7 MMOL/L (ref 3.5–5.1)
RBC # BLD: 4.2 M/UL (ref 4–5.2)
SODIUM BLD-SCNC: 142 MMOL/L (ref 136–145)
TOTAL PROTEIN: 7.2 G/DL (ref 6.4–8.2)
VITAMIN D 25-HYDROXY: 86.9 NG/ML
WBC # BLD: 3.8 K/UL (ref 4–11)

## 2021-09-07 PROCEDURE — 99213 OFFICE O/P EST LOW 20 MIN: CPT | Performed by: REGISTERED NURSE

## 2021-09-07 PROCEDURE — 36415 COLL VENOUS BLD VENIPUNCTURE: CPT | Performed by: REGISTERED NURSE

## 2021-09-07 RX ORDER — FLUOXETINE HYDROCHLORIDE 20 MG/1
CAPSULE ORAL
COMMUNITY
End: 2021-09-07

## 2021-09-07 RX ORDER — DESVENLAFAXINE 50 MG/1
TABLET, EXTENDED RELEASE ORAL
COMMUNITY
Start: 2021-04-07 | End: 2021-09-07

## 2021-09-07 RX ORDER — LINACLOTIDE 72 UG/1
CAPSULE, GELATIN COATED ORAL
COMMUNITY
Start: 2021-07-20 | End: 2021-09-07

## 2021-09-07 SDOH — ECONOMIC STABILITY: FOOD INSECURITY: WITHIN THE PAST 12 MONTHS, YOU WORRIED THAT YOUR FOOD WOULD RUN OUT BEFORE YOU GOT MONEY TO BUY MORE.: NEVER TRUE

## 2021-09-07 SDOH — ECONOMIC STABILITY: FOOD INSECURITY: WITHIN THE PAST 12 MONTHS, THE FOOD YOU BOUGHT JUST DIDN'T LAST AND YOU DIDN'T HAVE MONEY TO GET MORE.: NEVER TRUE

## 2021-09-07 ASSESSMENT — SOCIAL DETERMINANTS OF HEALTH (SDOH): HOW HARD IS IT FOR YOU TO PAY FOR THE VERY BASICS LIKE FOOD, HOUSING, MEDICAL CARE, AND HEATING?: NOT HARD AT ALL

## 2021-09-07 ASSESSMENT — ENCOUNTER SYMPTOMS
SHORTNESS OF BREATH: 0
GASTROINTESTINAL NEGATIVE: 1

## 2021-09-07 ASSESSMENT — PATIENT HEALTH QUESTIONNAIRE - PHQ9: DEPRESSION UNABLE TO ASSESS: PT REFUSES

## 2021-09-07 NOTE — PROGRESS NOTES
Pt was met during a warm hand-off. Pt was referred by their PCP for eating disorder- binging. This writer provided pt with information about [facility] services. Pt indicated they would schedule an appointment. Pt agreed to schedule an appointment at a future time. Also provided referrals to speciality care and support groups.

## 2021-09-07 NOTE — PROGRESS NOTES
Patient: Jyoti Hope is a 40 y.o. female who presents today with the following Chief Complaint(s):  Chief Complaint   Patient presents with    Depression     Chronic issue but patient feels it has gotten worse recently. Assessment/Plan:      1. Binge eating  History of bulimia with recent return to binge eating. She feels that binge eating has gotten worse after she had Covid and her sense of taste and smell returned. Now she is feeling like she is craving sugar and has a difficult time stopping herself from eating large quantities of food. She is not inducing vomiting. She is tearful during the visit and is open to a referral to a specialist.  Warm handoff to behavioral health consultants was initiated during visit. This will give patient access to resources and will give her the opportunity to bridge her care between this visit and getting an appointment with an eating disorder specialist.  Patient is reluctant to try a daily medication. She has trialed multiple antidepressant medications in the past and has had unwanted side effects. Plan is for her to wait until she sees a specialist before starting any medications. Blood work drawn in the office for evaluation of underlying etiology. 2. Polyphagia    - Hemoglobin A1C  - Comprehensive Metabolic Panel  - CBC Auto Differential    3. Fatigue, unspecified type    - Hemoglobin A1C  - Comprehensive Metabolic Panel  - CBC Auto Differential  - Vitamin D 25 Hydroxy      Return if symptoms worsen or fail to improve. HPI:     She is here for depression and some issues with binge eating. She does have a history of bulimia. She says she will eat an entire box of poptarts and then eat a bag of candy. She is worried and does not want to affect her health. She used to take prozac, she felt that it helped with her eating disorder but did not feel that it helped her depression.  She says she has tried other medications for depression but they made her migraines worse. She has not been able to exercise due to hip pain. Current Outpatient Medications   Medication Sig Dispense Refill    MAGNESIUM PO Take by mouth      Cholecalciferol (VITAMIN D3 PO) Take by mouth      Cobalamin Combinations (B-12) 100-5000 MCG SUBL B12      eletriptan (RELPAX) 40 MG tablet 1 po at onset of migraine. May repeat in 2 hours if necessary. Max 2/24 hr. 6 tablet 12     No current facility-administered medications for this visit. Review of Systems   Constitutional: Positive for fatigue. Negative for fever. Respiratory: Negative for shortness of breath. Cardiovascular: Negative for chest pain. Gastrointestinal: Negative. Endocrine: Positive for polyphagia. Negative for polydipsia and polyuria. Genitourinary: Negative. Neurological: Positive for headaches. Negative for light-headedness. Psychiatric/Behavioral: Positive for dysphoric mood. Negative for self-injury, sleep disturbance and suicidal ideas. The patient is not nervous/anxious. Vitals:    09/07/21 1130   BP: 108/64   Site: Left Upper Arm   Position: Sitting   Cuff Size: Medium Adult   Pulse: 60   Temp: 98.3 °F (36.8 °C)   TempSrc: Infrared   SpO2: 98%   Weight: 126 lb (57.2 kg)   Height: 5' 5\" (1.651 m)     Physical Exam  Constitutional:       General: She is not in acute distress. Appearance: Normal appearance. She is not ill-appearing. HENT:      Head: Normocephalic and atraumatic. Cardiovascular:      Rate and Rhythm: Normal rate and regular rhythm. Pulses: Normal pulses. Heart sounds: Normal heart sounds. No murmur heard. No friction rub. No gallop. Pulmonary:      Effort: Pulmonary effort is normal. No respiratory distress. Breath sounds: Normal breath sounds. No stridor. No wheezing, rhonchi or rales. Musculoskeletal:         General: Normal range of motion. Cervical back: Normal range of motion. Skin:     General: Skin is warm and dry.

## 2021-09-08 LAB
ESTIMATED AVERAGE GLUCOSE: 99.7 MG/DL
HBA1C MFR BLD: 5.1 %

## 2021-12-07 ENCOUNTER — HOSPITAL ENCOUNTER (OUTPATIENT)
Dept: GENERAL RADIOLOGY | Age: 46
Discharge: HOME | End: 2021-12-07
Payer: COMMERCIAL

## 2021-12-07 ENCOUNTER — HOSPITAL ENCOUNTER (OUTPATIENT)
Age: 46
Discharge: HOME | End: 2021-12-07
Payer: COMMERCIAL

## 2021-12-07 DIAGNOSIS — M54.59 OTHER LOW BACK PAIN: ICD-10-CM

## 2021-12-07 PROCEDURE — 72110 X-RAY EXAM L-2 SPINE 4/>VWS: CPT

## 2022-02-23 DIAGNOSIS — G43.109 MIGRAINE WITH AURA AND WITHOUT STATUS MIGRAINOSUS, NOT INTRACTABLE: ICD-10-CM

## 2022-02-23 RX ORDER — ELETRIPTAN HYDROBROMIDE 40 MG/1
TABLET, FILM COATED ORAL
Qty: 6 TABLET | Refills: 12 | Status: SHIPPED | OUTPATIENT
Start: 2022-02-23

## 2022-04-13 ENCOUNTER — HOSPITAL ENCOUNTER (OUTPATIENT)
Age: 47
Discharge: HOME | End: 2022-04-13
Payer: COMMERCIAL

## 2022-04-13 LAB
A/G RATIO: 2 (ref 1.1–2.2)
ALBUMIN SERPL-MCNC: 5 G/DL (ref 3.4–5)
ALP BLD-CCNC: 48 U/L (ref 40–129)
ALT SERPL-CCNC: 22 U/L (ref 10–40)
ANION GAP SERPL CALCULATED.3IONS-SCNC: 13 MMOL/L (ref 3–16)
AST SERPL-CCNC: 21 U/L (ref 15–37)
BASOPHILS ABSOLUTE: 0 K/UL (ref 0–0.2)
BASOPHILS RELATIVE PERCENT: 1 %
BILIRUB SERPL-MCNC: 0.7 MG/DL (ref 0–1)
BUN BLDV-MCNC: 16 MG/DL (ref 7–20)
CALCIUM SERPL-MCNC: 10 MG/DL (ref 8.3–10.6)
CHLORIDE BLD-SCNC: 101 MMOL/L (ref 99–110)
CO2: 25 MMOL/L (ref 21–32)
CREAT SERPL-MCNC: 0.7 MG/DL (ref 0.6–1.1)
EOSINOPHILS ABSOLUTE: 0.1 K/UL (ref 0–0.6)
EOSINOPHILS RELATIVE PERCENT: 3.7 %
GFR AFRICAN AMERICAN: >60
GFR NON-AFRICAN AMERICAN: >60
GLUCOSE BLD-MCNC: 85 MG/DL (ref 70–99)
HCT VFR BLD CALC: 44.1 % (ref 36–48)
HEMOGLOBIN: 14.6 G/DL (ref 12–16)
IGA: 223 MG/DL (ref 70–400)
INR BLD: 0.96 (ref 0.88–1.12)
LIPASE: 39 U/L (ref 13–60)
LYMPHOCYTES ABSOLUTE: 1 K/UL (ref 1–5.1)
LYMPHOCYTES RELATIVE PERCENT: 26.7 %
MCH RBC QN AUTO: 30.3 PG (ref 26–34)
MCHC RBC AUTO-ENTMCNC: 33.1 G/DL (ref 31–36)
MCV RBC AUTO: 91.5 FL (ref 80–100)
MONOCYTES ABSOLUTE: 0.3 K/UL (ref 0–1.3)
MONOCYTES RELATIVE PERCENT: 8.5 %
NEUTROPHILS ABSOLUTE: 2.3 K/UL (ref 1.7–7.7)
NEUTROPHILS RELATIVE PERCENT: 60.1 %
PDW BLD-RTO: 13.6 % (ref 12.4–15.4)
PLATELET # BLD: 248 K/UL (ref 135–450)
PMV BLD AUTO: 10.5 FL (ref 5–10.5)
POTASSIUM SERPL-SCNC: 5.5 MMOL/L (ref 3.5–5.1)
PROTHROMBIN TIME: 10.8 SEC (ref 9.9–12.7)
RBC # BLD: 4.82 M/UL (ref 4–5.2)
SODIUM BLD-SCNC: 139 MMOL/L (ref 136–145)
TOTAL PROTEIN: 7.5 G/DL (ref 6.4–8.2)
WBC # BLD: 3.8 K/UL (ref 4–11)

## 2022-04-13 PROCEDURE — 80053 COMPREHEN METABOLIC PANEL: CPT

## 2022-04-13 PROCEDURE — 83516 IMMUNOASSAY NONANTIBODY: CPT

## 2022-04-13 PROCEDURE — 83690 ASSAY OF LIPASE: CPT

## 2022-04-13 PROCEDURE — 85025 COMPLETE CBC W/AUTO DIFF WBC: CPT

## 2022-04-13 PROCEDURE — 85610 PROTHROMBIN TIME: CPT

## 2022-04-13 PROCEDURE — 83013 H PYLORI (C-13) BREATH: CPT

## 2022-04-13 PROCEDURE — 36415 COLL VENOUS BLD VENIPUNCTURE: CPT

## 2022-04-13 PROCEDURE — 82784 ASSAY IGA/IGD/IGG/IGM EACH: CPT

## 2022-04-14 LAB
H PYLORI BREATH TEST: NEGATIVE
TISSUE TRANSGLUTAMINASE IGA: <0.5 U/ML (ref 0–14)

## 2022-08-26 ENCOUNTER — HOSPITAL ENCOUNTER (OUTPATIENT)
Dept: WOMENS IMAGING | Age: 47
Discharge: HOME | End: 2022-08-26
Payer: COMMERCIAL

## 2022-08-26 DIAGNOSIS — Z12.31 SCREENING MAMMOGRAM, ENCOUNTER FOR: ICD-10-CM

## 2022-08-26 PROCEDURE — 77067 SCR MAMMO BI INCL CAD: CPT

## 2022-09-16 DIAGNOSIS — G43.109 MIGRAINE WITH AURA AND WITHOUT STATUS MIGRAINOSUS, NOT INTRACTABLE: ICD-10-CM

## 2022-09-16 RX ORDER — RIZATRIPTAN BENZOATE 10 MG/1
TABLET, ORALLY DISINTEGRATING ORAL
Qty: 12 TABLET | Refills: 3 | Status: SHIPPED | OUTPATIENT
Start: 2022-09-16

## 2022-09-19 ENCOUNTER — OFFICE VISIT (OUTPATIENT)
Dept: FAMILY MEDICINE CLINIC | Age: 47
End: 2022-09-19
Payer: COMMERCIAL

## 2022-09-19 VITALS
SYSTOLIC BLOOD PRESSURE: 100 MMHG | HEIGHT: 65 IN | OXYGEN SATURATION: 98 % | WEIGHT: 126 LBS | BODY MASS INDEX: 20.99 KG/M2 | HEART RATE: 66 BPM | DIASTOLIC BLOOD PRESSURE: 70 MMHG

## 2022-09-19 DIAGNOSIS — N81.10 FEMALE CYSTOCELE: ICD-10-CM

## 2022-09-19 DIAGNOSIS — F33.2 MDD (MAJOR DEPRESSIVE DISORDER), RECURRENT SEVERE, WITHOUT PSYCHOSIS (HCC): Primary | ICD-10-CM

## 2022-09-19 DIAGNOSIS — Z13.31 POSITIVE DEPRESSION SCREENING: ICD-10-CM

## 2022-09-19 DIAGNOSIS — K58.2 IRRITABLE BOWEL SYNDROME WITH BOTH CONSTIPATION AND DIARRHEA: ICD-10-CM

## 2022-09-19 PROCEDURE — 99214 OFFICE O/P EST MOD 30 MIN: CPT | Performed by: FAMILY MEDICINE

## 2022-09-19 RX ORDER — DESVENLAFAXINE 50 MG/1
50 TABLET, EXTENDED RELEASE ORAL DAILY
Qty: 30 TABLET | Refills: 1 | Status: SHIPPED | OUTPATIENT
Start: 2022-09-19

## 2022-09-19 RX ORDER — PLECANATIDE 3 MG/1
TABLET ORAL
COMMUNITY
Start: 2022-09-13

## 2022-09-19 ASSESSMENT — PATIENT HEALTH QUESTIONNAIRE - PHQ9
SUM OF ALL RESPONSES TO PHQ QUESTIONS 1-9: 22
SUM OF ALL RESPONSES TO PHQ QUESTIONS 1-9: 22
10. IF YOU CHECKED OFF ANY PROBLEMS, HOW DIFFICULT HAVE THESE PROBLEMS MADE IT FOR YOU TO DO YOUR WORK, TAKE CARE OF THINGS AT HOME, OR GET ALONG WITH OTHER PEOPLE: 2
6. FEELING BAD ABOUT YOURSELF - OR THAT YOU ARE A FAILURE OR HAVE LET YOURSELF OR YOUR FAMILY DOWN: 3
SUM OF ALL RESPONSES TO PHQ QUESTIONS 1-9: 22
5. POOR APPETITE OR OVEREATING: 3
8. MOVING OR SPEAKING SO SLOWLY THAT OTHER PEOPLE COULD HAVE NOTICED. OR THE OPPOSITE, BEING SO FIGETY OR RESTLESS THAT YOU HAVE BEEN MOVING AROUND A LOT MORE THAN USUAL: 1
9. THOUGHTS THAT YOU WOULD BE BETTER OFF DEAD, OR OF HURTING YOURSELF: 0
2. FEELING DOWN, DEPRESSED OR HOPELESS: 3
1. LITTLE INTEREST OR PLEASURE IN DOING THINGS: 3
SUM OF ALL RESPONSES TO PHQ QUESTIONS 1-9: 22
4. FEELING TIRED OR HAVING LITTLE ENERGY: 3
SUM OF ALL RESPONSES TO PHQ9 QUESTIONS 1 & 2: 6
7. TROUBLE CONCENTRATING ON THINGS, SUCH AS READING THE NEWSPAPER OR WATCHING TELEVISION: 3
3. TROUBLE FALLING OR STAYING ASLEEP: 3

## 2022-09-19 ASSESSMENT — COLUMBIA-SUICIDE SEVERITY RATING SCALE - C-SSRS
2. HAVE YOU ACTUALLY HAD ANY THOUGHTS OF KILLING YOURSELF?: NO
6. HAVE YOU EVER DONE ANYTHING, STARTED TO DO ANYTHING, OR PREPARED TO DO ANYTHING TO END YOUR LIFE?: NO
1. WITHIN THE PAST MONTH, HAVE YOU WISHED YOU WERE DEAD OR WISHED YOU COULD GO TO SLEEP AND NOT WAKE UP?: NO
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
## 2022-09-19 NOTE — PATIENT INSTRUCTIONS
Please consider making an appt with our psychiatric nurse practitioner, Micah Beltran. Please do morning coffee free trial.     Please lean forward when voiding.

## 2022-10-24 ENCOUNTER — PROCEDURE VISIT (OUTPATIENT)
Dept: AUDIOLOGY | Age: 47
End: 2022-10-24
Payer: COMMERCIAL

## 2022-10-24 DIAGNOSIS — H72.02 CENTRAL PERFORATION OF TYMPANIC MEMBRANE OF LEFT EAR: Primary | ICD-10-CM

## 2022-10-24 DIAGNOSIS — H93.11 TINNITUS OF RIGHT EAR: ICD-10-CM

## 2022-10-24 DIAGNOSIS — H71.92 CHOLESTEATOMA OF EAR, LEFT: ICD-10-CM

## 2022-10-24 DIAGNOSIS — H90.0 CONDUCTIVE HEARING LOSS, BILATERAL: Primary | ICD-10-CM

## 2022-10-24 PROCEDURE — 92557 COMPREHENSIVE HEARING TEST: CPT | Performed by: AUDIOLOGIST

## 2022-10-24 PROCEDURE — 92567 TYMPANOMETRY: CPT | Performed by: AUDIOLOGIST

## 2022-10-24 NOTE — PROGRESS NOTES
Lauro Wooten   1975, 56 y.o. female   [id number]       Referring Provider: Maria De Jesus Tripp MD  Referral Type: In an order in [address] [place] [address]    Reason for Visit: Evaluation of suspected change in hearing, tinnitus, or balance. ADULT AUDIOLOGIC EVALUATION      Lauro Wooten is a 56 y.o. female seen today, 10/24/2022 , for a recheck audiologic evaluation. Patient was seen by Maria De Jesus Tripp MD following today's evaluation. AUDIOLOGIC AND OTHER PERTINENT MEDICAL HISTORY:      Lauro Wooten reports sudden worsening of tinnitus in her right ear. She perceives the sound as a high pitched pure tone. She has had intermittent tinnitus for years but this is constant. She reported no perceived change in hearing sensitivity since her last visit. She noted bilateral aural fullness and an increase in \"dizziness. \" No other significant change in history. She denied otorrhea, history of falls, history of head trauma, and family history of hearing loss. Date: 10/24/2022     History from previous audiologic evaluation on 3/9/2021:  Today's results revealed hearing loss with excellent word recognition, bilaterally. Air bone gaps > 10 dBHL noted from 500-4000Hz in the left ear ad at [facility] in the right ear at [facility]. Hearing stable in left ear and decreased in right ear  compared to previous 11/14/16 audiogram.      History from previous ENT evaluation on 4/9/2021:  Pulled from [address] [place] [address] evaluation:  Impression:  1. Left tympanic membrane perforation  2. Bilateral mixed hearing loss  3. Status post right tympanomastoidectomy  4. Status post left tympanoplasty    Plan:  We discussed management options, including observation or surgery. I recommended dry ear precautions. There may be some keratin debris buildup around the umbo, but no obvious cholesteatoma extension further into the middle ear or mastoid based on exam and imaging. The patient would prefer to clinically observe this left ear.  RTC in 6 months. IMPRESSIONS:      Today's results revealed  a bilateral, conductive hearing loss. In comparison to her previous audiogram, there has been a 10dB shift at [facility], left ear and a 20dB shift at [facility], right ear. Excellent speech understanding when in quiet. Discussed possible benefits of amplification. Discussed use of tinnitus management strategies. Provided basic tinnitus education, including the neurophysiological model. Follow medical recommendations of David Maciel MD.     ASSESSMENT AND FINDINGS:     Otoscopy revealed a mild amount of cerumen bilaterally; removed a small amount prior to testing . RIGHT EAR:  Hearing Sensitivity: A moderate rising to mild conductive hearing loss through [facility] then sloping to a profound hearing loss. Speech Recognition Threshold: 30 dB HL  Word Recognition: Excellent 100%, based on NU-6 25-word list at 70 dBHL using recorded speech stimuli. Tympanometry: Did not test; could not maintain seal.      LEFT EAR:  Hearing Sensitivity: A moderate rising to mild conductive hearing loss then sloping to a moderately severe hearing loss. Speech Recognition Threshold: 30 dB HL  Word Recognition: Excellent 100%, based on NU-6 25-word list at 70 dBHL using recorded speech stimuli. Tympanometry: Did not test; could not maintain seal.      Reliability: Good   Transducer: Headphones    See scanned audiogram dated 10/24/2022 for results. PATIENT EDUCATION:       The following items were discussed with the patient:   - Good Communication Strategies  - Tinnitus Management Strategies    - Dizziness    Educational information was shared in the After Visit Summary.                                                 RECOMMENDATIONS:

## 2022-10-24 NOTE — PATIENT INSTRUCTIONS
Good Communication Strategies    Communication can be challenging for anyone, but can be especially difficult for those with some degree of hearing loss. While we may not be able to control every factor that may lead to difficulty with communication, there are Good Communication Strategies that we can all use in our day-to-day lives. Communication takes both parties working together for it to be successful. Tips as a Listener:   Control your environment. It is important to limit the amount of background noise in the room when possible. You should also consider having a good light source in the room to best see the other person. Ask for clarification. Instead of saying \"What?\", you can use parts of what you heard to make a new question. For example, if you heard the word \"Thursday\" but not the rest of the week, you may ask \"What was that about Thursday? \" or \"What did you want to do Thursday? \". This shows the person talking that you are listening and will help them better explain what they are saying. Be an advocate for yourself. If you are hearing but not understanding, tell the other person \"I can hear you, but I need you to slow down when you speak. \"  Or if someone is facing the other direction, say \"I cannot hear you when you are not looking at me when we talk. \"       Tips as a Talker:   - Sit or stand 3 to 6 feet away to maximize audibility         -- It is unrealistic to believe someone else will fully hear your message if you are speaking from across the room or in a different room in the house   - Stay at eye level to help with visual cues   - Make sure you have the persons attention before speaking   - Use facial expressions and gestures to accentuate your message   - Raise your voice slightly (do not scream)   - Speak slowly and distinctly   - Use short, simple sentences   - Rephrase your words if the person is having a hard time understanding you    - To avoid distortion, dont speak directly into a persons ear      Some additional items that may be helpful:   - Remain patient - this is important for both parties   - Write down items that still cannot be heard/understood. You may write with pen/paper or consider typing/texting on a cell phone or smart device. - If background noise is unavoidable, try to keep yourself in a good position in the room. By sitting at a ramirez on the side of the restaurant (preferably a corner), it will be easier to communicate than if you were sitting at a table in the middle with background noise surrounding you. Keep yourself positioned away from music speakers or heavy foot traffic. Hearing Loss: Care Instructions  Your Care Instructions      Hearing loss is a sudden or slow decrease in how well you hear. It can range from mild to profound. Permanent hearing loss can occur with aging, and it can happen when you are exposed long-term to loud noise. Examples include listening to loud music, riding motorcycles, or being around other loud machines. Hearing loss can affect your work and home life. It can make you feel lonely or depressed. You may feel that you have lost your independence. But hearing aids and other devices can help you hear better and feel connected to others. Follow-up care is a key part of your treatment and safety. Be sure to make and go to all appointments, and call your doctor if you are having problems. It's also a good idea to know your test results and keep a list of the medicines you take. How can you care for yourself at home? Avoid loud noises whenever possible. This helps keep your hearing from getting worse. Always wear hearing protection around loud noises. If appropriate, wear hearing aid(s) as directed. It is recommended that hearing aids are worn during all waking hours to keep your brain active and give it access to the sounds it is missing.       If you are beginning your process with hearing aid(s), schedule a \"Hearing Aid Evaluation\" with an audiologist to discuss your lifestyle, features of hearing aid technology, and styles of hearing aids available. It is recommended that you contact your insurance company to determine if you have a hearing aid benefit, as this may dictate who you can see for these services. Have hearing tests as your doctor suggests. They can show whether your hearing has changed. Your hearing aid may need to be adjusted. Use other assistive devices as needed. These may include:  Telephone amplifiers and hearing aids that can connect to a television, stereo, radio, or microphone. Devices that use lights or vibrations. These alert you to the doorbell, a ringing telephone, or a baby monitor. Television closed-captioning. This shows the words at the bottom of the screen. Most new TVs can do this. TTY (text telephone). This lets you type messages back and forth on the telephone instead of talking or listening. These devices are also called TDD. When messages are typed on the keyboard, they are sent over the phone line to a receiving TTY. The message is shown on a monitor. Use pagers, fax machines, text, and email if it is hard for you to communicate by telephone. Try to learn a listening technique called speech-reading. It is not lip-reading. You pay attention to people's gestures, expressions, posture, and tone of voice. These clues can help you understand what a person is saying. Face the person you are talking to, and have him or her face you. Make sure the lighting is good. You need to see the other person's face clearly. Think about counseling if you need help to adjust to your hearing loss. When should you call for help? Watch closely for changes in your health, and be sure to contact your doctor if:    You think your hearing is getting worse. You have new symptoms, such as dizziness or nausea.               Tinnitus: Overview and Management Strategies          Many people have some ringing sounds in their ears once in a while. You may hear a roar, a hiss, a tinkle, or a buzz. The sound usually lasts only a few minutes. If it goes on all the time, you may have tinnitus. Tinnitus is usually caused by long-term exposure to loud noise. This damages the nerves in the inner ear. It can occur with all types of hearing loss. It may be a symptom of almost any ear problem. Tinnitus may be caused by a buildup of earwax. Or, it may be caused by ear infections or certain medicines (especially antibiotics or large amounts of aspirin). You can also hear noises in your ears because of an injury to the ears, drinking too much alcohol or caffeine, or a medical condition. Other conditions may also contribute to tinnitus, including: head and neck trauma, temporomandibular joint disorder (TMJ), sinus pressure and barometric trauma, traumatic brain injury, metabolic disorders, autoimmune disorders, stress, and high blood pressure. You may need tests to evaluate your hearing and to find causes of long-lasting tinnitus. Your doctor may suggest one or more treatments to help you cope with the tinnitus. You can also do things at home to help reduce symptoms. Follow-up care is a key part of your treatment and safety. Be sure to make and go to all appointments, and call your doctor if you are having problems. It's also a good idea to know your test results and keep a list of the medicines you take. How can you care for yourself at home? Limit or cut out alcohol, caffeine, and sodium. They can make your symptoms worse. Do not smoke or use other tobacco products. Nicotine reduces blood flow to the ear and makes tinnitus worse. If you need help quitting, talk to your doctor about stop-smoking programs and medicines. These can increase your chances of quitting for good. Talk to your doctor about whether to stop taking aspirin and similar products such as ibuprofen or naproxen. Get exercise often.  It can help improve blood flow to the ear. Ways to manage/cope with tinnitus  Some tinnitus may last a long time. To manage your tinnitus, try to: Avoid noises that you think caused your tinnitus. If you can't avoid loud noises, wear earplugs or earmuffs. Ignore the sound by paying attention to other things. Keeping your brain busy with other tasks or background noise can help your brain not focus on the tinnitus. Try to not give the tinnitus an emotional reaction. Do your best to ignore the sound and not let it bother you. Relax using biofeedback, meditation, or yoga. Feeling stressed and being tired can make tinnitus worse. Play music or white noise to help you sleep. Background noise may cover up the noise that you hear in your ears. You can buy a tabletop machine or a device that sits under your pillow to play soothing sounds, like ocean waves. Smart phones have free apps, such as Whist, Relax Melodies, ReSound Relief, and [employer]. These apps have different types of sounds/noise, some of which you can blend together to find sounds that are most soothing to you. Hearing aid technology, especially when there is some hearing loss, may help reduce tinnitus symptoms by giving your brain better access to the sounds it is missing. There are some hearing aids with built-in noise generator programs, which may help when amplification alone is not enough. Additional resources may be found through the [employer]inn[employer] at www.darius.org    When should you call for help? Call 911 anytime you think you may need emergency care. For example, call if:    You have symptoms of a stroke. These may include:  Sudden numbness, tingling, weakness, or loss of movement in your face, arm, or leg, especially on only one side of your body. Sudden vision changes. Sudden trouble speaking. Sudden confusion or trouble understanding simple statements. Sudden problems with walking or balance.   A sudden, severe headache that is different from past headaches. Call your doctor now or seek immediate medical care if:    You develop other symptoms. These may include hearing loss (or worse hearing loss), balance problems, dizziness, nausea, or vomiting. Watch closely for changes in your health, and be sure to contact your doctor if:    Your tinnitus moves from both ears to one ear. Your hearing loss gets worse within 1 day after an ear injury. Your tinnitus or hearing loss does not get better within 1 week after an ear injury. Your tinnitus bothers you enough that you want to take medicines to help you cope with it. If you notice changes in your tinnitus and/or your hearing, it is recommended that you have your hearing tested by your audiologist and to follow-up with your physician that manages your hearing loss (such as your ENT or Primary Care doctor).

## 2022-12-12 ENCOUNTER — OFFICE VISIT (OUTPATIENT)
Dept: ENT CLINIC | Age: 47
End: 2022-12-12
Payer: COMMERCIAL

## 2022-12-12 VITALS
BODY MASS INDEX: 19.73 KG/M2 | SYSTOLIC BLOOD PRESSURE: 122 MMHG | TEMPERATURE: 98.5 F | HEART RATE: 69 BPM | DIASTOLIC BLOOD PRESSURE: 71 MMHG | RESPIRATION RATE: 16 BRPM | HEIGHT: 65 IN | WEIGHT: 118.4 LBS

## 2022-12-12 DIAGNOSIS — M26.622 ARTHRALGIA OF LEFT TEMPOROMANDIBULAR JOINT: Primary | ICD-10-CM

## 2022-12-12 PROCEDURE — 99213 OFFICE O/P EST LOW 20 MIN: CPT | Performed by: OTOLARYNGOLOGY

## 2022-12-12 RX ORDER — CIPROFLOXACIN AND DEXAMETHASONE 3; 1 MG/ML; MG/ML
4 SUSPENSION/ DROPS AURICULAR (OTIC) 2 TIMES DAILY
Qty: 7.5 ML | Refills: 0 | Status: SHIPPED | OUTPATIENT
Start: 2022-12-12 | End: 2022-12-17

## 2022-12-12 NOTE — Clinical Note
Dr. Vivek Holloway, I have been seeing Ms. 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 Rigo Bernard.

## 2022-12-12 NOTE — PROGRESS NOTES
[address] UP VISIT      Patient Name: Alysha Suresh  Medical Record Number:  [id number]  Primary Care Physician:  Raphael Garcia MD    ChiefComplaint     Chief Complaint   Patient presents with    Ear Problem     Patient states that she is having left ear pain. She states that she frequently has ear pain but yesterday the pain got really bad. History of Present Illness     Alysha Suresh is an 56 y.o. female previously seen for chronic middle ear disease. Prior otologic history:  Left ear: Prior history of left tympanoplasty 12/2018 due to left tympanic membrane central perforation - has had prior tympanoplasty of the left ear 12/2017  Right ear: History of cholesteatoma at age 16-14 with right ear canal wall up tympanomastoidectomy    Interval History 3/9/2021: Patient states that for the past year she has had the feeling of \"wetness\" in the left ear however no cristal otorrhea, along with intermittent throbbing pain (2-3/10) localized to the ear without radiation. States hearing loss bilaterally along with \"crunching\" (?tinnitus) sound in the contralateral (right) ear. Has bilateral hearing loss at baseline. Of further concern, she has had several episodes of vertigo over the prior two years - one lasting 7-10 days after her tympanoplasty 12/2018, and another approximately 1 week ago lasting 24-48h. Not accompanied by roaring tinnitus or acute hearing loss. Interval history 12/12/2022: For the past week she has had severe pain in the left ear radiating down to the trapezius and jawline, along with fullness of the left ear however no otorrhea, no vertigo. No fevers, chills, night sweats    Past Medical History     Past Medical History:   Diagnosis Date    Allergy history unknown     Anxiety     Bulimia     Dental crowns present     molar    Depression     Has been dx'd with bipolar 2, ocd, personality d/o as well. Unclear what dx is correct.     Dizziness     Fibromyalgia     Hearing loss     Migraine     b/l    Perforation of tympanic membrane     Dr Ioana Garcia, multipe perforations       Past Surgical History     Past Surgical History:   Procedure Laterality Date    BREAST ENHANCEMENT SURGERY Bilateral 2014    behind muscle    BREAST IMPLANT REMOVAL  01/2014    adjustment  to implants    BREAST SURGERY  2013    augmentation    DILATION AND CURETTAGE OF UTERUS      INNER EAR SURGERY Right     mastoidectomy, age 16 or 16, had cholesteocytoma    MASTOIDECTOMY Right     TONSILLECTOMY      TYMPANIC MEMBRANE REPAIR Right     TYMPANOPLASTY Left 12/15/2017    LEFT TYMPANOPLASTY                        TYMPANOPLASTY Left 12/14/2018    POST-AURICULAR TYMPANOPLASTY WITH LATERAL PLACEMENT OF TEMPORALIS FASCIA GRAFT performed by Zeb Finney MD at [facility] OR       Family History     Family History   Problem Relation Age of Onset    Other Mother         fibromyalgia    Mult Sclerosis Sister     Breast Cancer Paternal Grandmother     Breast Cancer Paternal Aunt        Social History     Social History     Tobacco Use    Smoking status: Never    Smokeless tobacco: Never   Vaping Use    Vaping Use: Never used   Substance Use Topics    Alcohol use: No    Drug use: No        Allergies     Allergies   Allergen Reactions    Butalbital-Apap-Caff-Cod     Codeine Nausea Only    Flagyl [Metronidazole]      Tongue either swelled or felt odd    Ultram [Tramadol Hcl] Nausea Only    Celebrex [Celecoxib] Itching and Rash    Darvocet [Propoxyphene N-Acetaminophen] Nausea And Vomiting    Food     Percocet [Oxycodone-Acetaminophen] Nausea And Vomiting    Sulfamethoxazole-Trimethoprim     Vicodin [Hydrocodone-Acetaminophen] Nausea And Vomiting       Medications     Current Outpatient Medications   Medication Sig Dispense Refill    ciprofloxacin-dexamethasone (CIPRODEX) 0.3-0.1 % otic suspension Place 4 drops into the left ear 2 times daily for 5 days 7.5 mL 0 TRULANCE 3 MG TABS TAKE 1 TABLET BY MOUTH EVERY DAY      rizatriptan (MAXALT-MLT) 10 MG disintegrating tablet DISSOLVE ONE TABLET BY MOUTH AT ONSET OF HEADACHE, MAY REPEAT ONE TABLET IN 2 HOURS IF NEEDED 12 tablet 3    eletriptan (RELPAX) 40 MG tablet TAKE ONE TABLET BY MOUTH AT ONSET OF MIGRAINE. MAY REPEAT IN TWO HOURS IF NECESSARY FOR A MAX OF TWO TABLETS IN 24 HOURS 6 tablet 12    MAGNESIUM PO Take by mouth      Cholecalciferol (VITAMIN D3 PO) Take by mouth      Cobalamin Combinations (B-12) 100-5000 MCG SUBL B12      desvenlafaxine succinate (PRISTIQ) 50 MG TB24 extended release tablet Take 1 tablet by mouth daily (Patient not taking: Reported on 12/12/2022) 30 tablet 1     No current facility-administered medications for this visit.        Review of Systems     REVIEW OF SYSTEMS  The following systems were reviewed and revealed the following in addition to any already discussed in the HPI:    CONSTITUTIONAL: no weight loss, no fever, no night sweats, no chills  EYES: no vision changes, no blurry vision  EARS: no changes in hearing, +otalgia  NOSE: no epistaxis, no rhinorrhea  RESPIRATORY: no difficulty breathing, no shortness of breath  CV: no chest pain, no peripheral vascular disease  HEME: No coagulation disorder, no bleeding disorder  NEURO: No TIA or stroke-like symptoms  SKIN: No new rashes in the head and neck, no recent skin cancers  MOUTH: No new ulcers, no recent teeth infections  GASTROINTESTINAL: No diarrhea, stomach pain  PSYCH: +anxiety, no depression    PhysicalExam     Vitals:    12/12/22 1038   BP: 122/71   Site: Right Upper Arm   Position: Sitting   Cuff Size: Medium Adult   Pulse: 69   Resp: 16   Temp: 98.5 °F (36.9 °C)   TempSrc: Infrared   Weight: 118 lb 6.4 oz (53.7 kg)   Height: 5' 5\" (1.651 m)       PHYSICAL EXAM  /71 (Site: Right Upper Arm, Position: Sitting, Cuff Size: Medium Adult)   Pulse 69   Temp 98.5 °F (36.9 °C) (Infrared)   Resp 16   Ht 5' 5\" (1.651 m)   Wt 118 lb 6.4 oz (53.7 kg)   BMI 19.70 kg/m²     GENERAL: No Acute Distress, Alert and Oriented, no hoarseness  EYES: EOMI, Anti-icteric  NOSE: On anterior rhinoscopy there is no epistaxis, nasal mucosa within normal limits, no purulent drainage  EARS: Normal external appearance; on portable otomicroscopy:  -Right ear: External auditory canal occluded with cerumen  -Left ear: External auditory canal without stenosis, tympanic membrane with significant myringosclerosis in the inferior aspect, malleus manubrium noted, perforation in the ana m-inferior quadrant (10-15%) - middle ear without inflammation   FACE: 1/6 House-Brackmann Scale, symmetric, sensation equal bilaterally  ORAL CAVITY: No masses or lesions palpated, uvula is midline, moist mucous membranes, 0+ tonsils, good dentition  NECK: Normal range of motion, no thyromegaly, trachea is midline, no lymphadenopathy, no neck masses, no crepitus  CHEST: Normal respiratory effort, no retractions, breathing comfortably  SKIN: No rashes, normal appearing skin, no evidence of skin lesions/tumors  NEURO: CN 2, 3, 4, 5, 6, 7, 11, 12 intact bilaterally          Procedure     Binocular Otomicroscopy     Pre op: Left ear otorrhea  Post op: Left ear cholesteatoma    Procedure : Binocular otomicroscopy  Surgeon: RUSSELL Wilks  Estimated Blood Loss: None    After obtaining consent, the patient was placed in the examination chair in the reclined position.      -Right ear: External auditory canal with stenosis, tympanic membrane showing intact malleus manubrium however myringosclerosis in the anterior-inferior aspect with 10-15% central perforation, no epithelial ingrowth/cholesteatoma noted, middle ear otherwise clear  -Left ear: External auditory canal with no stenosis, tympanic membrane showing 30-40% central perforation, middle ear with eroded malleus manubrium and cristal cholesteatoma medial to this over the promontory.   We could not appreciate the incudostapedial joint due to tympanic membrane opacification but the middle ear is noninflamed and no purulent drainage is noted     * Patient tolerated the procedure well with no complications    Assessment and Plan     1. Cholesteatoma of ear, left  Patient with history of chronic middle ear disease, has had 2 prior tympanoplasty procedures most recently 12/2018. Of concern she has had chronic \"wetness\" with throbbing pain in the left ear, with 2 episodes of vertigo spaced approximately 1.5 years apart. On examination we appreciate cristal cholesteatoma the left ear between the promontory and the malleus manubrium which appears eroded. Audiometric testing shows mixed hearing loss in the left ear, sensorineural hearing loss in the right ear. CT imaging completed    2. Tympanic membrane perforation, right  Small right anterior-inferior tympanic membrane perforation, will discuss repairing at a future date. 3. Left otalgia  No mastoid tenderness/fluctuance - no otorrhea, vertigo, fevers/chills on history and inflammation of the ear noted on examination. Will start her on ear drops as precaution, but recommendations for myofascial pain/TMJ outlined. If no significant improvement or development of headaches, altered sensorium, blurry vision etc. Will have her call immediately. Return in about 1 week (around 12/19/2022). Macrina Marcano MD  [address] [place], [place] [address]  Department of Otolaryngology/Head and Neck Surgery  12/12/22    I have performed a head and neck physical exam personally or was physically present during the key or critical portions of the service. Medical Decision Making:   The following items were considered in medical decision making:  Independent review of images  Review / order clinical lab tests  Review / order radiology tests  Decision to obtain old records  Review and summation of old records as accessed through [facility] (a summary of my findings in these old records: None)     Portions of this note were dictated using Dragon.  There may be linguistic errors secondary to the use of this program.

## 2022-12-12 NOTE — PATIENT INSTRUCTIONS
Temporomandibular Joint Arthralgia (suspected) guidelines:  -Ibuprofen 600mg every 8 hours x 7 days (may exacerbate LPR/gastroesophageal reflux)  -Soft non chewing diet x 7-14 days  -Warm compresses to the left temporomandibular joint every 4 hours x 7 days  -Consider use of mouthguard to prevent nocturnal bruxism (teeth grinding at night) or jaw clenching during the day)    -Start ciprodex ear drops - 4 drops twice daily to the left ear,

## 2022-12-19 ENCOUNTER — OFFICE VISIT (OUTPATIENT)
Dept: ENT CLINIC | Age: 47
End: 2022-12-19
Payer: COMMERCIAL

## 2022-12-19 VITALS
HEIGHT: 65 IN | RESPIRATION RATE: 16 BRPM | HEART RATE: 75 BPM | DIASTOLIC BLOOD PRESSURE: 76 MMHG | SYSTOLIC BLOOD PRESSURE: 112 MMHG | BODY MASS INDEX: 19.66 KG/M2 | TEMPERATURE: 97.5 F | WEIGHT: 118 LBS

## 2022-12-19 DIAGNOSIS — H93.233 HYPERACUSIS OF BOTH EARS: Primary | ICD-10-CM

## 2022-12-19 DIAGNOSIS — H72.02 CENTRAL PERFORATION OF TYMPANIC MEMBRANE OF LEFT EAR: ICD-10-CM

## 2022-12-19 DIAGNOSIS — M26.623 BILATERAL TEMPOROMANDIBULAR JOINT PAIN: ICD-10-CM

## 2022-12-19 DIAGNOSIS — H71.92 CHOLESTEATOMA OF LEFT MIDDLE EAR: ICD-10-CM

## 2022-12-19 PROCEDURE — 99213 OFFICE O/P EST LOW 20 MIN: CPT | Performed by: OTOLARYNGOLOGY

## 2022-12-19 NOTE — Clinical Note
Dr. Madhuri Vega, Planning to do left ear surgery on this patient. Please let me know if she can see you for preoperative clearance. Best wishes, Maria Luisa Saavedra.

## 2022-12-19 NOTE — PROGRESS NOTES
[address] UP VISIT      Patient Name: Euseibo Ji  Medical Record Number:  [id number]  Primary Care Physician:  Greyson An MD    ChiefComplaint     Chief Complaint   Patient presents with    Follow-up     Patient is here today to follow up on the left ear/jaw. Patient states that it still doesn't feel right. She states that she feels a light burning and she is hearing things louder than before. History of Present Illness     Eusebio Ji is an 56 y.o. female previously seen for chronic middle ear disease. Prior otologic history:  Left ear: Prior history of left tympanoplasty 12/2018 due to left tympanic membrane central perforation - has had prior tympanoplasty of the left ear 12/2017  Right ear: History of cholesteatoma at age 16-14 with right ear canal wall up tympanomastoidectomy    Interval History 3/9/2021: Patient states that for the past year she has had the feeling of \"wetness\" in the left ear however no cristal otorrhea, along with intermittent throbbing pain (2-3/10) localized to the ear without radiation. States hearing loss bilaterally along with \"crunching\" (?tinnitus) sound in the contralateral (right) ear. Has bilateral hearing loss at baseline. Of further concern, she has had several episodes of vertigo over the prior two years - one lasting 7-10 days after her tympanoplasty 12/2018, and another approximately 1 week ago lasting 24-48h. Not accompanied by roaring tinnitus or acute hearing loss. Interval history 12/12/2022: For the past week she has had severe pain in the left ear radiating down to the trapezius and jawline, along with fullness of the left ear however no otorrhea, no vertigo.   No fevers, chills, night sweats    Interval History 12/19/2022: Improvement in left ear pain however has had the development of \"burning\" in the left era along with hyperacusis     Past Medical History Past Medical History:   Diagnosis Date    Allergy history unknown     Anxiety     Bulimia     Dental crowns present     molar    Depression     Has been dx'd with bipolar 2, ocd, personality d/o as well. Unclear what dx is correct.     Dizziness     Fibromyalgia     Hearing loss     Migraine     b/l    Perforation of tympanic membrane     Dr Sherry Peterson, multipe perforations       Past Surgical History     Past Surgical History:   Procedure Laterality Date    BREAST ENHANCEMENT SURGERY Bilateral 2014    behind muscle    BREAST IMPLANT REMOVAL  01/2014    adjustment  to implants    BREAST SURGERY  2013    augmentation    DILATION AND CURETTAGE OF UTERUS      INNER EAR SURGERY Right     mastoidectomy, age 16 or 16, had cholesteocytoma    MASTOIDECTOMY Right     TONSILLECTOMY      TYMPANIC MEMBRANE REPAIR Right     TYMPANOPLASTY Left 12/15/2017    LEFT TYMPANOPLASTY                        TYMPANOPLASTY Left 12/14/2018    POST-AURICULAR TYMPANOPLASTY WITH LATERAL PLACEMENT OF TEMPORALIS FASCIA GRAFT performed by Bree Funk MD at [facility] OR       Family History     Family History   Problem Relation Age of Onset    Other Mother         fibromyalgia    Mult Sclerosis Sister     Breast Cancer Paternal Grandmother     Breast Cancer Paternal Aunt        Social History     Social History     Tobacco Use    Smoking status: Never    Smokeless tobacco: Never   Vaping Use    Vaping Use: Never used   Substance Use Topics    Alcohol use: No    Drug use: No        Allergies     Allergies   Allergen Reactions    Butalbital-Apap-Caff-Cod     Codeine Nausea Only    Flagyl [Metronidazole]      Tongue either swelled or felt odd    Ultram [Tramadol Hcl] Nausea Only    Celebrex [Celecoxib] Itching and Rash    Darvocet [Propoxyphene N-Acetaminophen] Nausea And Vomiting    Food     Percocet [Oxycodone-Acetaminophen] Nausea And Vomiting    Sulfamethoxazole-Trimethoprim     Vicodin [Hydrocodone-Acetaminophen] Nausea And Vomiting Medications     Current Outpatient Medications   Medication Sig Dispense Refill    TRULANCE 3 MG TABS TAKE 1 TABLET BY MOUTH EVERY DAY      rizatriptan (MAXALT-MLT) 10 MG disintegrating tablet DISSOLVE ONE TABLET BY MOUTH AT ONSET OF HEADACHE, MAY REPEAT ONE TABLET IN 2 HOURS IF NEEDED 12 tablet 3    eletriptan (RELPAX) 40 MG tablet TAKE ONE TABLET BY MOUTH AT ONSET OF MIGRAINE. MAY REPEAT IN TWO HOURS IF NECESSARY FOR A MAX OF TWO TABLETS IN 24 HOURS 6 tablet 12    MAGNESIUM PO Take by mouth      Cholecalciferol (VITAMIN D3 PO) Take by mouth      Cobalamin Combinations (B-12) 100-5000 MCG SUBL B12      desvenlafaxine succinate (PRISTIQ) 50 MG TB24 extended release tablet Take 1 tablet by mouth daily (Patient not taking: Reported on 12/19/2022) 30 tablet 1     No current facility-administered medications for this visit.        Review of Systems     REVIEW OF SYSTEMS  The following systems were reviewed and revealed the following in addition to any already discussed in the HPI:    CONSTITUTIONAL: no weight loss, no fever, no night sweats, no chills  EYES: no vision changes, no blurry vision  EARS: no changes in hearing, +otalgia  NOSE: no epistaxis, no rhinorrhea  RESPIRATORY: no difficulty breathing, no shortness of breath  CV: no chest pain, no peripheral vascular disease  HEME: No coagulation disorder, no bleeding disorder  NEURO: No TIA or stroke-like symptoms  SKIN: No new rashes in the head and neck, no recent skin cancers  MOUTH: No new ulcers, no recent teeth infections  GASTROINTESTINAL: No diarrhea, stomach pain  PSYCH: +anxiety, no depression    PhysicalExam     Vitals:    12/19/22 0907   BP: 112/76   Site: Right Upper Arm   Position: Sitting   Cuff Size: Medium Adult   Pulse: 75   Resp: 16   Temp: 97.5 °F (36.4 °C)   TempSrc: Infrared   Weight: 118 lb (53.5 kg)   Height: 5' 5\" (1.651 m)         PHYSICAL EXAM  /76 (Site: Right Upper Arm, Position: Sitting, Cuff Size: Medium Adult)   Pulse 75 Temp 97.5 °F (36.4 °C) (Infrared)   Resp 16   Ht 5' 5\" (1.651 m)   Wt 118 lb (53.5 kg)   BMI 19.64 kg/m²     GENERAL: No Acute Distress, Alert and Oriented, no hoarseness  EYES: EOMI, Anti-icteric  NOSE: On anterior rhinoscopy there is no epistaxis, nasal mucosa within normal limits, no purulent drainage  EARS: Normal external appearance; on portable otomicroscopy:  -Right ear: External auditory canal occluded with cerumen  -Left ear: External auditory canal without stenosis, tympanic membrane with significant myringosclerosis in the inferior aspect, malleus manubrium noted, perforation in the ana m-inferior quadrant (10-15%) - middle ear without inflammation   FACE: 1/6 House-Brackmann Scale, symmetric, sensation equal bilaterally  ORAL CAVITY: No masses or lesions palpated, uvula is midline, moist mucous membranes, 0+ tonsils, good dentition  NECK: Normal range of motion, no thyromegaly, trachea is midline, no lymphadenopathy, no neck masses, no crepitus  CHEST: Normal respiratory effort, no retractions, breathing comfortably  SKIN: No rashes, normal appearing skin, no evidence of skin lesions/tumors  NEURO: CN 2, 3, 4, 5, 6, 7, 11, 12 intact bilaterally          Procedure     Binocular Otomicroscopy     Pre op: Left ear otorrhea  Post op: Left ear cholesteatoma    Procedure : Binocular otomicroscopy  Surgeon: RUSSELL Wilks  Estimated Blood Loss: None    After obtaining consent, the patient was placed in the examination chair in the reclined position.      -Right ear: External auditory canal with stenosis, tympanic membrane showing intact malleus manubrium however myringosclerosis in the anterior-inferior aspect with 10-15% central perforation, no epithelial ingrowth/cholesteatoma noted, middle ear otherwise clear  -Left ear: External auditory canal with no stenosis, tympanic membrane showing 30-40% central perforation, middle ear with eroded malleus manubrium and cristal cholesteatoma medial to this over the promontory. We could not appreciate the incudostapedial joint due to tympanic membrane opacification but the middle ear is noninflamed and no purulent drainage is noted     * Patient tolerated the procedure well with no complications    Assessment and Plan     1. Cholesteatoma of ear, left  Patient with history of chronic middle ear disease, has had 2 prior tympanoplasty procedures most recently 12/2018. Of concern she has had chronic \"wetness\" with throbbing pain in the left ear, with 2 episodes of vertigo spaced approximately 1.5 years apart. On examination we appreciate cristal cholesteatoma the left ear between the promontory and the malleus manubrium which appears eroded. Audiometric testing shows mixed hearing loss in the left ear, sensorineural hearing loss in the right ear. CT imaging completed 03/2021.    -Risks and benefits of left ear tympanoplasty discussed including need for further surgery/graft failure (success rate in a nonsmoker anywhere from 75-98%), inflammation, infection, hypoesthesia, pain, injury to the facial nerve resulting in paresis (issues with eyebrow raise, eye closure, or smile on the ipsilateral smile), injury to the chorda tympani resulting in decreased/metallic taste, decreased hearing, cholesteatoma development  -Risks of mastoidectomy include inflammation, pain, infection, need for further surgery, hemorrhage, CSF leak, facial nerve injury  -May also need to perform ossicular chain reconstruction during the procedure; risks include displacement/slippage of prosthesis, MRI risk  -Risks of tragal cartilage graft include ear cosmesis, pain, inflammation/infection   -General anesthesia carries independent risks which will be discussed with Anesthesiology on day of procedure  -Will have patient see PCP for clearance prior to procedure (have routed chart)    2.  Tympanic membrane perforation, right  Small right anterior-inferior tympanic membrane perforation, will discuss repairing at a future date. 3. Left otalgia  No mastoid tenderness/fluctuance - no otorrhea, vertigo, fevers/chills on history and inflammation of the ear noted on examination. Ear drops completed, and has had improvement with TMJ instructions. Have discussed further medications including migraine treatment, muscle relaxers but she would like to avoid therapy    No follow-ups on file. Arnulfo Craig MD  [facility]  Department of Otolaryngology/Head and Neck Surgery  12/19/22    I have performed a head and neck physical exam personally or was physically present during the key or critical portions of the service. Medical Decision Making: The following items were considered in medical decision making:  Independent review of images  Review / order clinical lab tests  Review / order radiology tests  Decision to obtain old records  Review and summation of old records as accessed through [facility] (a summary of my findings in these old records: None)     Portions of this note were dictated using Dragon.  There may be linguistic errors secondary to the use of this program.

## 2023-01-09 ENCOUNTER — TELEPHONE (OUTPATIENT)
Dept: ENT CLINIC | Age: 48
End: 2023-01-09

## 2023-01-10 ENCOUNTER — TELEPHONE (OUTPATIENT)
Dept: FAMILY MEDICINE CLINIC | Age: 48
End: 2023-01-10

## 2023-01-10 DIAGNOSIS — F41.9 ANXIETY: ICD-10-CM

## 2023-01-10 DIAGNOSIS — K58.9 IRRITABLE BOWEL SYNDROME, UNSPECIFIED TYPE: Primary | ICD-10-CM

## 2023-01-10 NOTE — TELEPHONE ENCOUNTER
----- Message from Morris Whipple sent at 1/10/2023  1:53 PM EST -----  Subject: Referral Request    Reason for referral request? Dietician  Provider patient wants to be referred to(if known):     Provider Phone Number(if known): Additional Information for Provider?  Pt. states she has been seeing a GI   doctor and hasn't had any luck, still having some issues and the   medication the doctor has her on, it isn't helping.  ---------------------------------------------------------------------------  --------------  Roma KING    [phone]; OK to leave message on voicemail  ---------------------------------------------------------------------------  --------------

## 2023-01-10 NOTE — TELEPHONE ENCOUNTER
I'm happy to put in a referral, but to clarify, what is the dx that GI is using? Is it IBS or something else?

## 2023-01-11 NOTE — TELEPHONE ENCOUNTER
Pls tell her that I placed a dietician referral. Pls have her call to schedule:  [phone]. When she makes the appt, pls ask her to make sure the dietician sees IBS issues. If not, have her call us back. As for her divorce, I'm sorry to hear, but I believe that this will lead to long overdue healing for [facility]. She was on pristiq 50 as recommended by her [facility] results. Pls ask if she is willing to resume 25 mg qd.  Thx.

## 2023-01-11 NOTE — TELEPHONE ENCOUNTER
Pt has IBS. Pt is also asking for med to help with her anxiety? She is going thru a divorce. Please advise?

## 2023-01-12 RX ORDER — BUSPIRONE HYDROCHLORIDE 5 MG/1
5 TABLET ORAL 2 TIMES DAILY PRN
Qty: 60 TABLET | Refills: 2 | Status: SHIPPED | OUTPATIENT
Start: 2023-01-12 | End: 2023-02-11

## 2023-01-12 NOTE — TELEPHONE ENCOUNTER
Just so she knows, pristiq will help with anxiety. But, it's fine if she wants to avoid it. For prn use, she can try buspar bid prn. Rx sent in.

## 2023-01-12 NOTE — TELEPHONE ENCOUNTER
Pt informed and she stated that she is not sad it is anxiety with the issues going on with the kids and what they are going thru. She is not taking the pristiq and does not feel she needs it. Please advise?

## 2023-01-17 ENCOUNTER — HOSPITAL ENCOUNTER (OUTPATIENT)
Dept: PHYSICAL THERAPY | Age: 48
Setting detail: THERAPIES SERIES
Discharge: HOME | End: 2023-01-17
Payer: COMMERCIAL

## 2023-01-17 PROCEDURE — 97802 MEDICAL NUTRITION INDIV IN: CPT

## 2023-01-17 NOTE — PROGRESS NOTES
Delcid [address],  Sports [facility], [facility] [address] [place] [address], [address] [place] [address]. [id number]  P: [phone]  F: Live Elizabeth Patients - Initial Assessment  Referring MD: Nataliya Garcia MD   ICD 10: irritable bowel syndrome, unspecified type (K58.9 [ICD-10-CM])  Dietitian Appointment Visit Number: 1     PMHx: anxiety, bulimia, dental crowns, depression, dizziness, fibromyalgia, hearing loss, migraines, multiple inner ear surgery (head & neck pain)    2023    Name:  Kalen Spicer  :  1975    Reason for Consult: IBS - unspecified type  Subjective: Pt is a 57 yo F who presents today from PCP with referral for IBS diet education. Pt comes to visit with significant other. Concerns mainly with inability and fear of eating d/t GI distress experienced when she eats foods other than protein sources. She is ready to get a hold of this and exhibits and states she is ready for change.  - high life stress  - wanting to make a change; very supportive partner     Primary goal for nutrition counseling - diet education r/t IBS symptoms      ASSESSMENT  Biochemical Data, Medical Tests and Procedures:  Labs:  reviewed. Medication Hx: buspirone, vitamin D3, B-12, pristiq, replax, magnesium, malaxlt-mlt, trulance    Anthropometric Measurements:  Height (inches):65  Current Bodyweight (CBW): did not weigh at this visit d/t prior hx of eating disorder      Current exercise/PT schedule:  Pt exercises 6x/week. Uses this as stress relief and enjoys it. Does not do heavy weight d/t back injury at this time. Before weight lifting pt enjoyed running but cannot do that d/t injury      Food and Nutrition History:  Food Recall  - no meal pattern at this time d/t GI symptoms experienced with eating foods other than protein sources  - consumes ~2 protein shakes and protein bars daily. These do not cause GI upset.    - protein sources including: protein shake, protein bar, chicken, tuna, eggs  - fat sources including: nuts/seeds, avocado, and cooking oil  - at this time, does not tolerate: fruits, vegetables, grains        Caffeine intake: 4 cups coffee, 2 cups tea, 2 mountain dew, 1 energy drink    Hydration: cups/day  Water: states drinks a lot of water throughout the day  Electrolyte replacement: uses an electrolyte powder sometimes  Other: see caffeine intake    Dietary Habits   Who does most of the grocery shopping in your household? Patient/significant other  Who does most of the cooking in your household? Patient/significant other  Who decides menu/meal times in your household?  Patient/significant other    Female Only  Last period: will ask at next visit    Stress Level: 10  Work and personal related stress at an all time high d/t demanding work schedule, working through divorce, working on how to adjust with 4 kids and 1 grandkid, IBS symptoms  - explained roll of stress in body's ability to do its job and how it can also translate into physical symptoms such as GI distress and migraines    Amount of Sleep: less than 6 hours/night    DIAGNOSIS  Nutrition related knowledge deficit r/t eating for IBS symptoms AEB need for RD education around the subject matter      INTERVENTION  - Goal: start symptom journal to slowly work on adding small quantities of food back in: pt to record food type, quantity eaten, body's reaction  - Goal: add in electrolyte powder on daily regimen  - Goal: slowly work to reduce caffeine intake      Handouts Provided: to be sent via e-mail  - IBS guidelines  - symptom journal template  - business card for dietitian      MONITORING & EVALUATION  Send weekly documentation of symptom journal - to be reviewed and provided feedback on that basis  Pt will email or call with any additional questions  In person visit once able to create meal patterns/ideas  Reassess goals listed above and adjust as appropriate         Che Nichols MS, RDN, LD  Sports Dietitian  [email or web address]

## 2023-02-03 ENCOUNTER — TELEPHONE (OUTPATIENT)
Dept: FAMILY MEDICINE CLINIC | Age: 48
End: 2023-02-03

## 2023-02-03 NOTE — TELEPHONE ENCOUNTER
Pt called in wanting to know if she can get a different anxiety medication prescribed.  States she doesn't feel like it is making a difference and she has been on it since 1/12/23    [facility] [place]     Please advise

## 2023-02-06 NOTE — TELEPHONE ENCOUNTER
She's on buspar 5 1 bid, which is a very low dose. Pls have her increase to 2 bid. Pls have her call back this Thurs to let us know if she is noticing any improvement. I may further increase the dose at that time.  Thx.

## 2023-02-10 ENCOUNTER — TELEPHONE (OUTPATIENT)
Dept: FAMILY MEDICINE CLINIC | Age: 48
End: 2023-02-10

## 2023-02-10 NOTE — TELEPHONE ENCOUNTER
Patient is calling to let Joyce Lagos know that her medication the   busPIRone (BUSPAR) 5 MG tablet  Is not working for her. She said you wanted her to inform you and she said it feels as if she isn't taking anything for her anxiety. Please advise.   [phone]

## 2023-02-13 DIAGNOSIS — F41.9 ANXIETY: Primary | ICD-10-CM

## 2023-02-13 RX ORDER — BUSPIRONE HYDROCHLORIDE 15 MG/1
15 TABLET ORAL 3 TIMES DAILY
Qty: 90 TABLET | Refills: 5 | Status: SHIPPED | OUTPATIENT
Start: 2023-02-13

## 2023-02-13 NOTE — TELEPHONE ENCOUNTER
Pls tell pt that I sent in buspar 15 mg tid. Have her start 1 bid x 4 days, then increase to 1 tid. I hope she'll find the higher dose of buspar helps her anxiety.  Thx.

## 2023-02-13 NOTE — TELEPHONE ENCOUNTER
Contacted pt and she stated that she is on the Buspar and it keeps her awake. Please send alternative?

## 2023-02-14 RX ORDER — VILAZODONE HYDROCHLORIDE 10 MG/1
TABLET ORAL
Qty: 53 TABLET | Refills: 1 | Status: SHIPPED | OUTPATIENT
Start: 2023-02-14

## 2023-02-14 NOTE — TELEPHONE ENCOUNTER
Pls tell her I sent in Viibryd 10 mg 1qd x 7d, then 2 po qd to her Krwangr. Please ask her to schedule a VV or OV in approx 1 mo so that we can discuss the med. I think she'll like it. It's well tolerated, and I chose this based upon her GenesSmartCrowdz results. Most likely, in 1 mo, I'll further increase the dose.

## 2023-02-16 ENCOUNTER — TELEPHONE (OUTPATIENT)
Dept: FAMILY MEDICINE CLINIC | Age: 48
End: 2023-02-16

## 2023-02-16 NOTE — TELEPHONE ENCOUNTER
Patient was phoned and offer an appointment with Dr Cristiane Kim. She was also suggested she go to [facility] after hours clinic. Since it has been going on for about 3 weeks she is going to give it another week and try stretches.

## 2023-02-16 NOTE — TELEPHONE ENCOUNTER
----- Message from [facility] [employer] sent at 2/16/2023 12:14 PM EST -----  Subject: Referral Request    Reason for referral request? Patient is having a lot of pain in her left   hip and would like an xray today. Provider patient wants to be referred to(if known):     Provider Phone Number(if known): Additional Information for Provider? Please call patient to discuss.   ---------------------------------------------------------------------------  --------------  Daryle Haver GWIN    [phone]; OK to leave message on voicemail  ---------------------------------------------------------------------------  --------------

## 2023-02-27 ENCOUNTER — TELEPHONE (OUTPATIENT)
Dept: FAMILY MEDICINE CLINIC | Age: 48
End: 2023-02-27

## 2023-03-01 ENCOUNTER — OFFICE VISIT (OUTPATIENT)
Dept: ORTHOPEDIC SURGERY | Age: 48
End: 2023-03-01
Payer: COMMERCIAL

## 2023-03-01 VITALS — BODY MASS INDEX: 19.66 KG/M2 | HEIGHT: 65 IN | WEIGHT: 118 LBS

## 2023-03-01 DIAGNOSIS — M25.552 LEFT HIP PAIN: Primary | ICD-10-CM

## 2023-03-01 DIAGNOSIS — S76.012A STRAIN OF FLEXOR MUSCLE OF LEFT HIP, INITIAL ENCOUNTER: ICD-10-CM

## 2023-03-01 PROCEDURE — 99203 OFFICE O/P NEW LOW 30 MIN: CPT | Performed by: NURSE PRACTITIONER

## 2023-03-01 PROCEDURE — E0114 CRUTCH UNDERARM PAIR NO WOOD: HCPCS | Performed by: NURSE PRACTITIONER

## 2023-03-01 RX ORDER — METHYLPREDNISOLONE 4 MG/1
TABLET ORAL
Qty: 21 KIT | Refills: 0 | Status: SHIPPED | OUTPATIENT
Start: 2023-03-01

## 2023-03-01 NOTE — PROGRESS NOTES
CHIEF COMPLAINT:    Chief Complaint   Patient presents with    Hip Pain     Lt hip       HISTORY OF PRESENT ILLNESS:                The patient is a 56 y.o. female who presents today for evaluation of left hip pain. States she has been having left hip pain for approximately 5 weeks. She states she worked out doing a sumo squat lifting approximately 35 pounds which was just a few more pounds than what she typically did. She was fine that day but then the next day had some left anterior hip pain. She states she has pain with weightbearing. She is unable to internally rotate her left hip. Patient states that she is not diabetic. Denies history of kidney disease. Past Medical History:   Diagnosis Date    Allergy history unknown     Anxiety     Bulimia     Dental crowns present     molar    Depression     Has been dx'd with bipolar 2, ocd, personality d/o as well. Unclear what dx is correct. Dizziness     Fibromyalgia     Hearing loss     Migraine     b/l    Perforation of tympanic membrane     Dr Lindell Koyanagi, multipe perforations          The pain assessment was noted & is as follows:  Pain Assessment  Location of Pain: Hip  Location Modifiers: Left  Severity of Pain: 10  Quality of Pain: Dull, Throbbing  Duration of Pain: Persistent  Frequency of Pain: Constant  Aggravating Factors: Standing, Stretching, Straightening  Limiting Behavior: Yes  Result of Injury: No  Work-Related Injury: No  Are there other pain locations you wish to document?: No]      Work Status/Functionality:     Past Medical History: Medical history form was reviewed today & can be found in the media tab  Past Medical History:   Diagnosis Date    Allergy history unknown     Anxiety     Bulimia     Dental crowns present     molar    Depression     Has been dx'd with bipolar 2, ocd, personality d/o as well. Unclear what dx is correct.     Dizziness     Fibromyalgia     Hearing loss     Migraine     b/l    Perforation of tympanic membrane      [place], multipe perforations      Past Surgical History:     Past Surgical History:   Procedure Laterality Date    BREAST ENHANCEMENT SURGERY Bilateral 2014    behind muscle    BREAST IMPLANT REMOVAL  01/2014    adjustment  to implants    BREAST SURGERY  2013    augmentation    DILATION AND CURETTAGE OF UTERUS      INNER EAR SURGERY Right     mastoidectomy, age 16 or 16, had cholesteocytoma    MASTOIDECTOMY Right     TONSILLECTOMY      TYMPANIC MEMBRANE REPAIR Right     TYMPANOPLASTY Left 12/15/2017    LEFT TYMPANOPLASTY                        TYMPANOPLASTY Left 12/14/2018    POST-AURICULAR TYMPANOPLASTY WITH LATERAL PLACEMENT OF TEMPORALIS FASCIA GRAFT performed by Berenice Khalil MD at [facility] OR     Current Medications:     Current Outpatient Medications:     vilazodone HCl (VIIBRYD) 10 MG TABS, 1 po qd x 7 days, then 2 po qd, Disp: 53 tablet, Rfl: 1    busPIRone (BUSPAR) 15 MG tablet, Take 15 mg by mouth 3 times daily, Disp: 90 tablet, Rfl: 5    TRULANCE 3 MG TABS, TAKE 1 TABLET BY MOUTH EVERY DAY, Disp: , Rfl:     desvenlafaxine succinate (PRISTIQ) 50 MG TB24 extended release tablet, Take 1 tablet by mouth daily (Patient not taking: Reported on 12/19/2022), Disp: 30 tablet, Rfl: 1    rizatriptan (MAXALT-MLT) 10 MG disintegrating tablet, DISSOLVE ONE TABLET BY MOUTH AT ONSET OF HEADACHE, MAY REPEAT ONE TABLET IN 2 HOURS IF NEEDED, Disp: 12 tablet, Rfl: 3    eletriptan (RELPAX) 40 MG tablet, TAKE ONE TABLET BY MOUTH AT ONSET OF MIGRAINE. MAY REPEAT IN TWO HOURS IF NECESSARY FOR A MAX OF TWO TABLETS IN 24 HOURS, Disp: 6 tablet, Rfl: 12    MAGNESIUM PO, Take by mouth, Disp: , Rfl:     Cholecalciferol (VITAMIN D3 PO), Take by mouth, Disp: , Rfl:     Cobalamin Combinations (B-12) 100-5000 MCG SUBL, B12, Disp: , Rfl:   Allergies:  Butalbital-apap-caff-cod, Codeine, Flagyl [metronidazole], Ultram [tramadol hcl], Celebrex [celecoxib], Darvocet [propoxyphene n-acetaminophen], Food, Percocet [oxycodone-acetaminophen], Sulfamethoxazole-trimethoprim, and Vicodin [hydrocodone-acetaminophen]  Social History:    reports that she has never smoked. She has never used smokeless tobacco. She reports that she does not drink alcohol and does not use drugs. Family History:   Family History   Problem Relation Age of Onset    Other Mother         fibromyalgia    Mult Sclerosis Sister     Breast Cancer Paternal Grandmother     Breast Cancer Paternal Aunt        REVIEW OF SYSTEMS:   For new problems, a full review of systems will be found scanned in the patient's chart. CONSTITUTIONAL: Denies unexplained weight loss, fevers, chills   NEUROLOGICAL: Denies unsteady gait or progressive weakness  SKIN: Denies skin changes, delayed healing, rash, itching       PHYSICAL EXAM:    Vitals: Height 5' 5\" (1.651 m), weight 118 lb (53.5 kg), not currently breastfeeding. GENERAL EXAM:  General Apparence: Patient is adequately groomed with no evidence of malnutrition. Orientation: The patient is oriented to time, place and person. Mood & Affect:The patient's mood and affect are appropriate       Left hip PHYSICAL EXAMINATION:  Inspection: No visual deformity. No erythema, ecchymosis or effusion. Palpation: No real tenderness to palpation. Range of Motion: Decreased motion with straight leg raise and internal rotation with pain in left hip. Strength: mild decrease with left hip flexion due to discomfort. Special Tests: Positive FADIR negative Marjo Cam. Negative Homans. Negative logroll. Skin:  There are no rashes, ulcerations or lesions. There are no dysvascular changes     Gait & station: Antalgic      Additional Examinations:        Right Lower Extremity: Examination of the right lower extremity does not show any tenderness, deformity or injury. Range of motion is unremarkable. There is no gross instability. There are no rashes, ulcerations or lesions. Strength and tone are normal.      Diagnostic Testing:   The following x rays were read and interpreted by myself      1.  2 views of left hip fluid 1 view of pelvis. These views were independently reviewed. No visual deformity. Joint spaces well-maintained. No visual acute or subacute fractures noted. No dislocation noted. Orders     Orders Placed This Encounter   Procedures    XR HIP LEFT (2-3 VIEWS)     Standing Status:   Future     Number of Occurrences:   1     Standing Expiration Date:   4/1/2023    Aluminum Crutches     Patient was prescribed Medline Aluminum Crutches. This mobility device is required for the following reasons:    Patient has mobility limitations that significantly impair their ability to participate in one or more mobility related activities of daily living. The patient is able to safely use the mobility device. Functional mobility deficit can be sufficiently resolved with the use of this device. Verbal and written instructions for the use of and application of this item were provided. The patient was educated and fit by a healthcare professional with expert knowledge and specialization in brace application while under the direct supervision of the treating physician. They were instructed to contact the office immediately should the equipment result in increased pain, decreased sensation, increased swelling or worsening of the condition. Assessment / Treatment Plan:     1. Left hip pain    2. Flexor strain left hip     Advised patient to rest.  Offered Medrol Dosepak and this was prescribed. Patient was fitted with crutches due to pain with weightbearing. Pain is to be her guide. I think it is reasonable for patient to follow-up in 1 to 2 weeks unless pain completely subsides for further evaluation including labral pathology due to exam and pain with weightbearing. All questions have been answered. Patient understands and agrees with plan of care.

## 2023-03-01 NOTE — TELEPHONE ENCOUNTER
Pt notified. States she already stopped the medication. Will call back once she has her work schedule figured out to schedule with Dr. Elsa Carter.

## 2023-04-17 ENCOUNTER — PROCEDURE VISIT (OUTPATIENT)
Dept: AUDIOLOGY | Age: 48
End: 2023-04-17

## 2023-04-17 ENCOUNTER — TELEPHONE (OUTPATIENT)
Dept: AUDIOLOGY | Age: 48
End: 2023-04-17

## 2023-04-17 ENCOUNTER — OFFICE VISIT (OUTPATIENT)
Dept: ENT CLINIC | Age: 48
End: 2023-04-17
Payer: COMMERCIAL

## 2023-04-17 VITALS — HEIGHT: 65 IN | TEMPERATURE: 98.1 F | BODY MASS INDEX: 19.16 KG/M2 | RESPIRATION RATE: 16 BRPM | WEIGHT: 115 LBS

## 2023-04-17 DIAGNOSIS — H90.6 MIXED HEARING LOSS, BILATERAL: ICD-10-CM

## 2023-04-17 DIAGNOSIS — H72.93 TYMPANIC MEMBRANE PERFORATION, BILATERAL: Primary | ICD-10-CM

## 2023-04-17 DIAGNOSIS — H71.92 CHOLESTEATOMA OF LEFT MIDDLE EAR: ICD-10-CM

## 2023-04-17 DIAGNOSIS — H90.0 CONDUCTIVE HEARING LOSS, BILATERAL: Primary | ICD-10-CM

## 2023-04-17 PROCEDURE — 99999 PR OFFICE/OUTPT VISIT,PROCEDURE ONLY: CPT | Performed by: AUDIOLOGIST

## 2023-04-17 PROCEDURE — 99214 OFFICE O/P EST MOD 30 MIN: CPT | Performed by: OTOLARYNGOLOGY

## 2023-04-17 NOTE — PROGRESS NOTES
Surgery  4/17/23    I have performed a head and neck physical exam personally or was physically present during the key or critical portions of the service. Medical Decision Making: The following items were considered in medical decision making:  Independent review of images  Review / order clinical lab tests  Review / order radiology tests  Decision to obtain old records  Review and summation of old records as accessed through [facility] (a summary of my findings in these old records: None)     Portions of this note were dictated using Dragon.  There may be linguistic errors secondary to the use of this program.

## 2023-04-17 NOTE — TELEPHONE ENCOUNTER
Left voicemail with patient to inform her on insurance benefit. Patient's insurance will cover 80% of hearing aids once deductible has been met. Patient will owe $1,180.00 at the hearing aid dispensing appointment. She was previously quoted $41.00 which is incorrect.

## 2023-04-20 ENCOUNTER — TELEPHONE (OUTPATIENT)
Dept: FAMILY MEDICINE CLINIC | Age: 48
End: 2023-04-20

## 2023-04-20 ENCOUNTER — TELEMEDICINE (OUTPATIENT)
Dept: FAMILY MEDICINE CLINIC | Age: 48
End: 2023-04-20
Payer: COMMERCIAL

## 2023-04-20 DIAGNOSIS — F41.9 ANXIETY: Primary | ICD-10-CM

## 2023-04-20 PROCEDURE — 99213 OFFICE O/P EST LOW 20 MIN: CPT | Performed by: NURSE PRACTITIONER

## 2023-04-20 RX ORDER — HYDROXYZINE HYDROCHLORIDE 25 MG/1
25 TABLET, FILM COATED ORAL EVERY 8 HOURS PRN
Qty: 60 TABLET | Refills: 2 | Status: SHIPPED | OUTPATIENT
Start: 2023-04-20 | End: 2023-04-30

## 2023-04-20 ASSESSMENT — PATIENT HEALTH QUESTIONNAIRE - PHQ9
SUM OF ALL RESPONSES TO PHQ QUESTIONS 1-9: 19
7. TROUBLE CONCENTRATING ON THINGS, SUCH AS READING THE NEWSPAPER OR WATCHING TELEVISION: 3
10. IF YOU CHECKED OFF ANY PROBLEMS, HOW DIFFICULT HAVE THESE PROBLEMS MADE IT FOR YOU TO DO YOUR WORK, TAKE CARE OF THINGS AT HOME, OR GET ALONG WITH OTHER PEOPLE: 1
4. FEELING TIRED OR HAVING LITTLE ENERGY: 3
SUM OF ALL RESPONSES TO PHQ9 QUESTIONS 1 & 2: 6
5. POOR APPETITE OR OVEREATING: 1
SUM OF ALL RESPONSES TO PHQ QUESTIONS 1-9: 19
2. FEELING DOWN, DEPRESSED OR HOPELESS: 3
1. LITTLE INTEREST OR PLEASURE IN DOING THINGS: 3
8. MOVING OR SPEAKING SO SLOWLY THAT OTHER PEOPLE COULD HAVE NOTICED. OR THE OPPOSITE, BEING SO FIGETY OR RESTLESS THAT YOU HAVE BEEN MOVING AROUND A LOT MORE THAN USUAL: 0
SUM OF ALL RESPONSES TO PHQ QUESTIONS 1-9: 19
9. THOUGHTS THAT YOU WOULD BE BETTER OFF DEAD, OR OF HURTING YOURSELF: 0
3. TROUBLE FALLING OR STAYING ASLEEP: 3
SUM OF ALL RESPONSES TO PHQ QUESTIONS 1-9: 19
6. FEELING BAD ABOUT YOURSELF - OR THAT YOU ARE A FAILURE OR HAVE LET YOURSELF OR YOUR FAMILY DOWN: 3

## 2023-04-20 ASSESSMENT — ENCOUNTER SYMPTOMS
GASTROINTESTINAL NEGATIVE: 1
RESPIRATORY NEGATIVE: 1

## 2023-04-20 NOTE — PROGRESS NOTES
Temperature-  Pulse oximetry-     Constitutional: [x] Appears well-developed and well-nourished [x] No apparent distress      [] Abnormal-   Mental status  [x] Alert and awake  [x] Oriented to person/place/time []Able to follow commands      Eyes:  EOM    []  Normal  [] Abnormal-  Sclera  []  Normal  [] Abnormal -         Discharge []  None visible  [] Abnormal -    HENT:   [x] Normocephalic, atraumatic. [] Abnormal   [] Mouth/Throat: Mucous membranes are moist.     External Ears [] Normal  [] Abnormal-     Neck: [] No visualized mass     Pulmonary/Chest: [x] Respiratory effort normal.  [] No visualized signs of difficulty breathing or respiratory distress        [] Abnormal-      Musculoskeletal:   [] Normal gait with no signs of ataxia         [] Normal range of motion of neck        [] Abnormal-       Neurological:        [] No Facial Asymmetry (Cranial nerve 7 motor function) (limited exam to video visit)          [] No gaze palsy        [] Abnormal-         Skin:        [x] No significant exanthematous lesions or discoloration noted on facial skin         [] Abnormal-            Psychiatric:       [] Normal Affect [] No Hallucinations        [x] Abnormal- tearful    Other pertinent observable physical exam findings-     ASSESSMENT/PLAN:  1. Anxiety  Will trial hydroxyzine for anxiety and sleep. Patient advised she can try 2 tablets at night for sleep. Patient will follow up with PCP.   - hydrOXYzine HCl (ATARAX) 25 MG tablet; Take 1 tablet by mouth every 8 hours as needed for Anxiety  Dispense: 60 tablet; Refill: 2          Kayla Pump is a 53 y.o. female being evaluated by a Virtual Visit (video visit) encounter to address concerns as mentioned above. A caregiver was present when appropriate. Due to this being a TeleHealth encounter (During VCXXX-86 public health emergency), evaluation of the following organ systems was limited: Vitals/Constitutional/EENT/Resp/CV/GI//MS/Neuro/Skin/Heme-Lymph-Imm.

## 2023-04-21 ENCOUNTER — TELEPHONE (OUTPATIENT)
Dept: FAMILY MEDICINE CLINIC | Age: 48
End: 2023-04-21

## 2023-04-21 NOTE — TELEPHONE ENCOUNTER
Spoke with Pt, informed Pt that Rs is out of the office today and RS may not look at the message until monday when she is  back in the office

## 2023-04-21 NOTE — TELEPHONE ENCOUNTER
Spoke with Pt today, she is scheduled with Dr. Yamini Aguilar. Pt saw Victor Hugo Salomon yesterday. Pt states that she cant stop crying at work and if she cant stop crying at work she will lose her job. Pt states that. Dr. Oral Chowdhury knows her situation.  Pt ask if Dr. Oral Chowdhury could call her something else in. Pklease advise

## 2023-04-21 NOTE — TELEPHONE ENCOUNTER
Patient needs an appointment with Dr. George Epperson to follow up on anxiety and medications.  Please schedule this for an in office visit

## 2023-04-21 NOTE — TELEPHONE ENCOUNTER
Pt states she is unable to leave work ,checking status of the 1st message left ,she had cancelled her 340 appt with Dr Enriqueta Zaman. please advised.

## 2023-04-24 ENCOUNTER — TELEPHONE (OUTPATIENT)
Dept: FAMILY MEDICINE CLINIC | Age: 48
End: 2023-04-24

## 2023-04-24 DIAGNOSIS — G43.109 MIGRAINE WITH AURA AND WITHOUT STATUS MIGRAINOSUS, NOT INTRACTABLE: ICD-10-CM

## 2023-04-24 RX ORDER — RIZATRIPTAN BENZOATE 10 MG/1
TABLET, ORALLY DISINTEGRATING ORAL
Qty: 12 TABLET | Refills: 3 | Status: SHIPPED | OUTPATIENT
Start: 2023-04-24

## 2023-04-26 ENCOUNTER — OFFICE VISIT (OUTPATIENT)
Dept: PSYCHOLOGY | Age: 48
End: 2023-04-26

## 2023-04-26 ENCOUNTER — OFFICE VISIT (OUTPATIENT)
Dept: FAMILY MEDICINE CLINIC | Age: 48
End: 2023-04-26
Payer: COMMERCIAL

## 2023-04-26 ENCOUNTER — TELEPHONE (OUTPATIENT)
Dept: ADMINISTRATIVE | Age: 48
End: 2023-04-26

## 2023-04-26 VITALS
BODY MASS INDEX: 20.16 KG/M2 | SYSTOLIC BLOOD PRESSURE: 100 MMHG | HEART RATE: 85 BPM | DIASTOLIC BLOOD PRESSURE: 70 MMHG | HEIGHT: 65 IN | WEIGHT: 121 LBS | OXYGEN SATURATION: 98 %

## 2023-04-26 DIAGNOSIS — N92.1 METRORRHAGIA: ICD-10-CM

## 2023-04-26 DIAGNOSIS — F50.89 BINGE-PURGE BEHAVIOR: ICD-10-CM

## 2023-04-26 DIAGNOSIS — N94.3 PMS (PREMENSTRUAL SYNDROME): ICD-10-CM

## 2023-04-26 DIAGNOSIS — F41.1 GAD (GENERALIZED ANXIETY DISORDER): ICD-10-CM

## 2023-04-26 DIAGNOSIS — F41.9 ANXIETY: ICD-10-CM

## 2023-04-26 DIAGNOSIS — F33.1 MDD (MAJOR DEPRESSIVE DISORDER), RECURRENT EPISODE, MODERATE (HCC): Primary | ICD-10-CM

## 2023-04-26 DIAGNOSIS — G43.109 MIGRAINE WITH AURA AND WITHOUT STATUS MIGRAINOSUS, NOT INTRACTABLE: ICD-10-CM

## 2023-04-26 DIAGNOSIS — F33.2 MDD (MAJOR DEPRESSIVE DISORDER), RECURRENT SEVERE, WITHOUT PSYCHOSIS (HCC): Primary | ICD-10-CM

## 2023-04-26 DIAGNOSIS — J02.9 SORE THROAT: ICD-10-CM

## 2023-04-26 PROCEDURE — 99215 OFFICE O/P EST HI 40 MIN: CPT | Performed by: FAMILY MEDICINE

## 2023-04-26 RX ORDER — BUPROPION HYDROCHLORIDE 150 MG/1
150 TABLET ORAL EVERY MORNING
COMMUNITY
End: 2023-04-26

## 2023-04-26 RX ORDER — CLONAZEPAM 0.5 MG/1
0.5 TABLET ORAL DAILY PRN
Qty: 30 TABLET | Refills: 0 | Status: SHIPPED | OUTPATIENT
Start: 2023-04-26 | End: 2023-05-26

## 2023-04-26 RX ORDER — BUPROPION HYDROCHLORIDE 150 MG/1
150 TABLET ORAL EVERY MORNING
Qty: 30 TABLET | Refills: 3 | Status: SHIPPED | OUTPATIENT
Start: 2023-04-26

## 2023-04-26 RX ORDER — PLECANATIDE 3 MG/1
3 TABLET ORAL
COMMUNITY

## 2023-04-26 NOTE — PATIENT INSTRUCTIONS
Please make an appointment with one of our [facility] Consultants, Dr. Ca Muhammad or Lokesh Arechiga. They will work with you to develop a plan to improve your overall well-being by addressing any behavioral or mental health factors that are influencing your health. You can schedule your appointment just like you schedule your other appointments here, at the  or over the phone. Each appointment will be 30 minutes long, and you will typically be seen every 2-4 weeks. Your insurance should cover the visit, but you may owe a specialist copay. If you would prefer to be seen by a therapist more frequently, or for a longer visit, please ask your Primary Care Provider for a recommendation.

## 2023-04-26 NOTE — PROGRESS NOTES
Assessment/Plan:    Renetta Dick was seen today for pharyngitis. Diagnoses and all orders for this visit:    MDD (major depressive disorder), recurrent severe, without psychosis ([facility] [address]  -     buPROPion (WELLBUTRIN XL) 150 MG extended release tablet; Take 1 tablet by mouth every morning. Per Solo, pt may require lower dose than others. Warm handoff to [facility], Dr La Benz. Migraine with aura and without status migrainosus, not intractable  -     Rimegepant Sulfate 75 MG TBDP; Take 75 mg by mouth every other day, new rx. Qod dosing for tx and prevention. OK to take maxalt if needed. Anxiety  -     clonazePAM (KLONOPIN) 0.5 MG tablet; Take 1 tablet by mouth daily as needed for Anxiety for up to 30 days. Max Daily Amount: 0.5 mg, new rx    45 min visit. Patient Instructions   Please make an appointment with one of our [facility] Consultants, Dr. La Benz or Flakita Vaughn. They will work with you to develop a plan to improve your overall well-being by addressing any behavioral or mental health factors that are influencing your health. You can schedule your appointment just like you schedule your other appointments here, at the  or over the phone. Each appointment will be 30 minutes long, and you will typically be seen every 2-4 weeks. Your insurance should cover the visit, but you may owe a specialist copay. If you would prefer to be seen by a therapist more frequently, or for a longer visit, please ask your Primary Care Provider for a recommendation. Patient: Lauro Wooten is a 53 y. o.female who presents today with the following Chief Complaint(s):  Chief Complaint   Patient presents with    Pharyngitis     For past 3 days. Non stop crying all the time         HPI: Pt with hx of anxiety/dep, bulemia, migraines, chronic back pain (Dr Madelaine Landis). Much stress.  Moved out of family home with youngest son (15) to apt 2 wk ago, 2 min from home with soon to be ex and 25 and 21 yr

## 2023-04-26 NOTE — PATIENT INSTRUCTIONS
Return to see Dr. Pool Wagoner in 2 weeks.                                                Depression Cycle         Thoughts        -There is no point in doing anything        -I dont have the energy        -I dont feel like it        -I will probably fail        -I need to rest more        -Ill do it later                          Depressive Symptoms     Behaviors  -Tired/Overwhelmed      -Stay in Bed  -Bored        -Avoid People  -Depressed       -Avoid Fun Activities  -Feeling Worthless      -Avoid Work  -Apathetic/Discouraged     -Guilty                                     Consequences of Behaviors      -Isolated from friends and family      -Decreased number of rewarding experiences      -Decreased sense of accomplishment and pleasure      -Discouraged      -Sink deeper and deeper into a state of paralysis      -Decreased productivity convinces you that you are inadequate

## 2023-04-26 NOTE — PROGRESS NOTES
Behavioral Health Consultation  Mark Mallory, Ph.D.  Psychologist  4/26/2023  11:11 AM EDT      Time spent with Patient: 25 minutes  This is patient's first [facility] appointment. Reason for Consult:    Chief Complaint   Patient presents with    Depression     Referring Provider: Veda Wagoner MD  [facility],  Kayla Crawfordneena 20    Pt provided informed consent for the behavioral health program. Discussed with patient model of service to include the limits of confidentiality (i.e. abuse reporting, suicide intervention, etc.) and short-term intervention focused approach. Pt indicated understanding. Feedback given to PCP. S:  Patient presents with concerns about depression and anxiety. Sx have been present for most of her adult life, but have been increased due to moving out of her house 2 weeks ago. She feels guilty about leaving her ex , not having all of her kids move with her. Her 15year-old son is living with her, but her 25and 21year-old sons still live with their father. Patient reports depressive symptoms, including  tearful, depressed mood, deactivation, anhedonia, poor self-worth, variable appetite, insomnia/hypersomnia, fatigue, psychomotor retardation, and poor concentration/focus. [place] SI/HI. She has also been engaging in binge/purge pattern. She describes feeling happy when eating, but later regrets and induces vomiting. Feels ashamed about this behavior. Sx are aggravated by relationship dynamics, situational stress. Goals for treatment include reducing tearfulness, feeling happier, increasing self-worth. \"I just don't want to be me anymore. \"     Patient lives with her son in an apartment. Patient works part-time at a car [employer]. Daily routine is variable. Daily caffeine use includes up to 4 cups of coffee, 2 20 oz bottles of Mt. Dew, and 1 caffeine \"fizz\". No cigarette use, no alcohol use, no illegal drug use. Patient spends 1-2 hours a day on social media or watching TV.

## 2023-05-02 ENCOUNTER — TELEPHONE (OUTPATIENT)
Dept: AUDIOLOGY | Age: 48
End: 2023-05-02

## 2023-05-02 NOTE — TELEPHONE ENCOUNTER
Called patient to inform her of updated amount due at fitting. Patient was initially quoted $41.00 for coverage; however, patient would owe $1,180.00 at the time of fitting. Patient stated she cannot afford that at this time but will be completing an interview for a new job tomorrow. Will call back whether she wants to reschedule or cancel hearing aid order.

## 2023-05-03 ENCOUNTER — TELEMEDICINE (OUTPATIENT)
Dept: PSYCHOLOGY | Age: 48
End: 2023-05-03
Payer: COMMERCIAL

## 2023-05-03 DIAGNOSIS — F41.1 GAD (GENERALIZED ANXIETY DISORDER): ICD-10-CM

## 2023-05-03 DIAGNOSIS — F33.1 MDD (MAJOR DEPRESSIVE DISORDER), RECURRENT EPISODE, MODERATE (HCC): Primary | ICD-10-CM

## 2023-05-03 PROCEDURE — 90832 PSYTX W PT 30 MINUTES: CPT | Performed by: PSYCHOLOGIST

## 2023-05-03 NOTE — PROGRESS NOTES
Behavioral Health Consultation  Rupinder Owens, Ph.D.  Psychologist  5/3/2023  10:43 AM EDT      Time spent with Patient: 20 minutes  This is patient's second [facility] appointment. Reason for Consult:    Chief Complaint   Patient presents with    Depression    Anxiety     Referring Provider: Braden Lee MD  [facility] Kayla Barahonarenéneena 20    Pt provided informed consent for the behavioral health program. Discussed with patient model of service to include the limits of confidentiality (i.e. abuse reporting, suicide intervention, etc.) and short-term intervention focused approach. Pt indicated understanding. Feedback given to PCP. TELEHEALTH VISIT -- Audio/Visual (During [facility]-44 public health emergency)    Pursuant to the emergency declaration under the [address] [place] [address], Critical access hospital5 waiver authority and the [employer] Act, this Virtual Visit was conducted, with patient's consent, to reduce the patient's risk of exposure to COVID-19 and provide continuity of care for an established patient. Services were provided through a video synchronous discussion virtually to substitute for in-person clinic visit. Pt gave verbal informed consent to participate in telehealth services. Conducted a risk-benefit analysis and determined that the patient's presenting problems are consistent with the use of telepsychology. Determined that the patient has sufficient knowledge and skills in the use of technology enabling them to adequately benefit from telepsychology. It was determined that this patient was able to be properly treated without an in-person session. Patient verified that they were currently located at the [facility] address that was provided during registration.     Verified the following information:  Patient's identification: Yes  Patient location: [address] [place] [address]   Patient's call back number: [phone]

## 2023-05-03 NOTE — PATIENT INSTRUCTIONS
A Letter of Self-Compassion  Youre going to start with choosing an aspect of yourself that you dislike and criticize. It may be an appearance, career, relationships, health, etc.  Write in detail about how this perceived inadequacy makes you feel. What thoughts, images, emotions, or stories come up when you think about it? Next, imagine someone who is unconditionally loving, accepting, and supportive. This friend sees your strengths and opportunities for growth, including the negative aspects about you. The friend accepts and forgives, embracing you kindly just as you are. Now write a letter to yourself from the perspective of this kind friend. What does he or she say to you? How does this friend encourage and support you in taking steps to change? Let the words flow and dont stress about structure or phrasing. After fully drafting the letter, put it aside for fifteen minutes. Then return to the letter and reread it. Let the words sink in. Feel the encouragement, support, compassion, and acceptance. Review the letter whenever you are feeling down about this aspect and remember that accepting yourself is the first step to change. Self-Compassion    Definition of Self-Compassion:    Having compassion for oneself is really no different than having compassion for others. Think about what the experience of compassion feels like. First, to have compassion for others you must notice that they are suffering. If you ignore that homeless person on the street, you cant feel compassion for how difficult his or her experience is. Second, compassion involves feeling moved by others suffering so that your heart responds to their pain (the word compassion literally means to suffer with). When this occurs, you feel warmth, caring, and the desire to help the suffering person in some way.  Having compassion also means that you offer understanding and kindness to others when they fail or make mistakes, rather than

## 2023-05-05 ENCOUNTER — TELEMEDICINE (OUTPATIENT)
Dept: FAMILY MEDICINE CLINIC | Age: 48
End: 2023-05-05
Payer: COMMERCIAL

## 2023-05-05 DIAGNOSIS — T19.2XXA RETAINED TAMPON, INITIAL ENCOUNTER: Primary | ICD-10-CM

## 2023-05-05 PROCEDURE — 99213 OFFICE O/P EST LOW 20 MIN: CPT | Performed by: STUDENT IN AN ORGANIZED HEALTH CARE EDUCATION/TRAINING PROGRAM

## 2023-05-05 NOTE — PROGRESS NOTES
Lorene Sparks (:  1975) is a Established patient, presenting virtually for evaluation of the following:    Assessment & Plan   Below is the assessment and plan developed based on review of pertinent history, physical exam, labs, studies, and medications. 1. Retained tampon, initial encounter  Discussed with the patient that she sounds like she is having mild symptoms. Discussed symptoms of toxic shock syndrome and what to watch out for. At this point, given the lack of continued malodorous discharge and given the lack of any of the more significant symptoms, do not think that treatment for BV or prophylaxis for some sort of staphylococcal infection is warranted at this time. Discussed with patient that there is limitations to audiovisual communication and evaluation of this concern. Discussed with patient that she should monitor her symptoms over the weekend, can call the on-call staff person if her symptoms get progressively worse, and give instructions for when to seek a higher level of care. It should be known, that if patient did have some more severe symptoms of staphylococcal style infection (one that could develop into Toxic shock syndrome), then my intention would be to treat with Keflex and clindamycin. No follow-ups on file. Subjective   Other  Patient is a 50-year-old female who presents over the telephone for A/V communication to discuss recent symptoms. Patient states that she had initially noticed a mild amount of vaginal odor and discovered that she had left a tampon in the vaginal vault after her most recent period approximately 2 weeks ago. She states that she removed the tampon and there was some odor at that time, but that the odor has since improved. She is not having any active clear discharge, thick white discharge. She is having some small amount of contents that is brown in appearance. She is having mild suprapubic pain.   She denies fever, nausea, vomiting,

## 2023-05-10 ENCOUNTER — TELEMEDICINE (OUTPATIENT)
Dept: FAMILY MEDICINE CLINIC | Age: 48
End: 2023-05-10
Payer: COMMERCIAL

## 2023-05-10 DIAGNOSIS — F41.9 ANXIETY: ICD-10-CM

## 2023-05-10 DIAGNOSIS — F50.89 BINGE-PURGE BEHAVIOR: ICD-10-CM

## 2023-05-10 DIAGNOSIS — N94.3 PMS (PREMENSTRUAL SYNDROME): ICD-10-CM

## 2023-05-10 DIAGNOSIS — F33.2 MDD (MAJOR DEPRESSIVE DISORDER), RECURRENT SEVERE, WITHOUT PSYCHOSIS (HCC): Primary | ICD-10-CM

## 2023-05-10 PROCEDURE — 99214 OFFICE O/P EST MOD 30 MIN: CPT | Performed by: FAMILY MEDICINE

## 2023-05-10 SDOH — ECONOMIC STABILITY: HOUSING INSECURITY
IN THE LAST 12 MONTHS, WAS THERE A TIME WHEN YOU DID NOT HAVE A STEADY PLACE TO SLEEP OR SLEPT IN A SHELTER (INCLUDING NOW)?: NO

## 2023-05-10 SDOH — ECONOMIC STABILITY: FOOD INSECURITY: WITHIN THE PAST 12 MONTHS, YOU WORRIED THAT YOUR FOOD WOULD RUN OUT BEFORE YOU GOT MONEY TO BUY MORE.: NEVER TRUE

## 2023-05-10 SDOH — ECONOMIC STABILITY: INCOME INSECURITY: HOW HARD IS IT FOR YOU TO PAY FOR THE VERY BASICS LIKE FOOD, HOUSING, MEDICAL CARE, AND HEATING?: SOMEWHAT HARD

## 2023-05-10 SDOH — ECONOMIC STABILITY: TRANSPORTATION INSECURITY
IN THE PAST 12 MONTHS, HAS LACK OF TRANSPORTATION KEPT YOU FROM MEETINGS, WORK, OR FROM GETTING THINGS NEEDED FOR DAILY LIVING?: NO

## 2023-05-10 SDOH — ECONOMIC STABILITY: FOOD INSECURITY: WITHIN THE PAST 12 MONTHS, THE FOOD YOU BOUGHT JUST DIDN'T LAST AND YOU DIDN'T HAVE MONEY TO GET MORE.: NEVER TRUE

## 2023-05-10 NOTE — PROGRESS NOTES
eletriptan (RELPAX) 40 MG tablet TAKE ONE TABLET BY MOUTH AT ONSET OF MIGRAINE; MAY REPEAT ONE TABLET IN 2 HOURS IF NEEDED. MAX OF 2 TABLETS IN 24 HOURS Yes Mo Nicholas MD   busPIRone (BUSPAR) 15 MG tablet Take 15 mg by mouth 3 times daily  Patient not taking: Reported on 4/20/2023  Mo Nicholas MD       Social History     Tobacco Use    Smoking status: Never    Smokeless tobacco: Never   Vaping Use    Vaping Use: Never used   Substance Use Topics    Alcohol use: No    Drug use: No            PHYSICAL EXAMINATION:  [ INSTRUCTIONS:  \"[x]\" Indicates a positive item  \"[]\" Indicates a negative item  -- DELETE ALL ITEMS NOT EXAMINED]      Constitutional: [x] Appears well-developed and well-nourished [x] No apparent distress      [] Abnormal-   Mental status  [x] Alert and awake  [x] Oriented to person/place/time [x]Able to follow commands      Eyes:  EOM    [x]  Normal  [] Abnormal-  Sclera  [x]  Normal  [] Abnormal -         Discharge [x]  None visible  [] Abnormal -    HENT:   [x] Normocephalic, atraumatic. [] Abnormal   [] Mouth/Throat: Mucous membranes are moist.     External Ears [x] Normal  [] Abnormal-     Neck: [x] No visualized mass     Pulmonary/Chest: [x] Respiratory effort normal.  [x] No visualized signs of difficulty breathing or respiratory distress        [] Abnormal-      Musculoskeletal:   [] Normal gait with no signs of ataxia         [x] Normal range of motion of neck        [] Abnormal-       Neurological:        [x] No Facial Asymmetry (Cranial nerve 7 motor function) (limited exam to video visit)          [x] No gaze palsy        [] Abnormal-         Skin:        [x] No significant exanthematous lesions or discoloration noted on facial skin         [] Abnormal-            Psychiatric:       [x] Normal Affect [] No Hallucinations        [] Abnormal-     Other pertinent observable physical exam findings-     ASSESSMENT/PLAN:  1.  MDD (major depressive disorder), recurrent severe, without

## 2023-05-18 DIAGNOSIS — F33.2 MDD (MAJOR DEPRESSIVE DISORDER), RECURRENT SEVERE, WITHOUT PSYCHOSIS (HCC): ICD-10-CM

## 2023-05-18 RX ORDER — BUPROPION HYDROCHLORIDE 150 MG/1
TABLET ORAL
Qty: 30 TABLET | Refills: 3 | OUTPATIENT
Start: 2023-05-18

## 2023-08-04 ENCOUNTER — TELEPHONE (OUTPATIENT)
Dept: FAMILY MEDICINE CLINIC | Age: 48
End: 2023-08-04

## 2023-08-04 NOTE — TELEPHONE ENCOUNTER
Pt states that she cannot submit evisit until after 5:00.  Pt notified that she can either submit the evisit to the pool as soon as she can or go to an urgent care to be evaluated if she cannot submit the evisit

## 2023-08-04 NOTE — TELEPHONE ENCOUNTER
Pt called to say she thinks she has a UTI. She would like something called in for her to her pharmacy. Pt offered an appt, but declined the appt saying \"I can't get off from work. \"     [facility]

## 2023-08-07 ENCOUNTER — CLINICAL DOCUMENTATION (OUTPATIENT)
Dept: AUDIOLOGY | Age: 48
End: 2023-08-07

## 2023-08-07 NOTE — PROGRESS NOTES
Placed reorder for hearing aids. Ordered the Oticon Real 1 miniRITE-R in the color 90, power 85, length 2, 8mm double bass domes, bilaterally.   Order number [id number] and PO number [id number]

## 2023-08-15 ENCOUNTER — TELEMEDICINE (OUTPATIENT)
Dept: FAMILY MEDICINE CLINIC | Age: 48
End: 2023-08-15
Payer: COMMERCIAL

## 2023-08-15 DIAGNOSIS — F33.2 MDD (MAJOR DEPRESSIVE DISORDER), RECURRENT SEVERE, WITHOUT PSYCHOSIS (HCC): Primary | ICD-10-CM

## 2023-08-15 DIAGNOSIS — F41.9 ANXIETY: ICD-10-CM

## 2023-08-15 PROCEDURE — 99214 OFFICE O/P EST MOD 30 MIN: CPT | Performed by: FAMILY MEDICINE

## 2023-08-15 RX ORDER — VALACYCLOVIR HYDROCHLORIDE 500 MG/1
TABLET, FILM COATED ORAL
COMMUNITY
Start: 2023-08-07

## 2023-08-15 RX ORDER — LINACLOTIDE 290 UG/1
CAPSULE, GELATIN COATED ORAL
COMMUNITY
Start: 2023-08-10

## 2023-08-23 NOTE — PROGRESS NOTES
moist    External Ears [x] Normal  [] Abnormal -    Neck: [x] No visualized mass [] Abnormal -     Pulmonary/Chest: [x] Respiratory effort normal   [x] No visualized signs of difficulty breathing or respiratory distress        [] Abnormal -      Musculoskeletal:   [x] Normal gait with no signs of ataxia         [x] Normal range of motion of neck        [] Abnormal -     Neurological:        [x] No Facial Asymmetry (Cranial nerve 7 motor function) (limited exam due to video visit)          [x] No gaze palsy        [] Abnormal -          Skin:        [x] No significant exanthematous lesions or discoloration noted on facial skin         [] Abnormal -            Psychiatric:       [x] Normal Affect [] Abnormal -        [x] No Hallucinations    Other pertinent observable physical exam findings:-         On this date 8/15/2023 I have spent 30 minutes reviewing previous notes, test results and face to face (virtual) with the patient discussing the diagnosis and importance of compliance with the treatment plan as well as documenting on the day of the visit. Rupert Reid, was evaluated through a synchronous (real-time) audio-video encounter. The patient (or guardian if applicable) is aware that this is a billable service, which includes applicable co-pays. This Virtual Visit was conducted with patient's (and/or legal guardian's) consent. Patient identification was verified, and a caregiver was present when appropriate. The patient was located at Home: [facility] [address]  [address]  Provider was located at [facility] (Appt Dept): [facility],  [address] [place] [address]    [address] [place] [address]! Confirm you are appropriately licensed, registered, or certified to deliver care in the state where the patient is located as indicated above. If you are not or unsure, please re-schedule the visit.     Are you appropriately licensed in the patient's state?: Yes         --Irene Mello MD

## 2023-08-25 ENCOUNTER — PROCEDURE VISIT (OUTPATIENT)
Dept: AUDIOLOGY | Age: 48
End: 2023-08-25

## 2023-08-25 ENCOUNTER — OFFICE VISIT (OUTPATIENT)
Dept: ENT CLINIC | Age: 48
End: 2023-08-25
Payer: COMMERCIAL

## 2023-08-25 VITALS
BODY MASS INDEX: 18.66 KG/M2 | DIASTOLIC BLOOD PRESSURE: 72 MMHG | WEIGHT: 112 LBS | HEART RATE: 64 BPM | SYSTOLIC BLOOD PRESSURE: 112 MMHG | OXYGEN SATURATION: 100 % | HEIGHT: 65 IN

## 2023-08-25 DIAGNOSIS — H90.0 CONDUCTIVE HEARING LOSS, BILATERAL: Primary | ICD-10-CM

## 2023-08-25 DIAGNOSIS — H69.83 DYSFUNCTION OF BOTH EUSTACHIAN TUBES: ICD-10-CM

## 2023-08-25 DIAGNOSIS — H71.92 CHOLESTEATOMA OF LEFT EAR: ICD-10-CM

## 2023-08-25 DIAGNOSIS — H72.93 PERFORATION OF BOTH TYMPANIC MEMBRANES: ICD-10-CM

## 2023-08-25 DIAGNOSIS — H93.11 TINNITUS OF RIGHT EAR: ICD-10-CM

## 2023-08-25 DIAGNOSIS — H90.6 MIXED CONDUCTIVE AND SENSORINEURAL HEARING LOSS OF BOTH EARS: Primary | ICD-10-CM

## 2023-08-25 PROCEDURE — 99214 OFFICE O/P EST MOD 30 MIN: CPT | Performed by: OTOLARYNGOLOGY

## 2023-08-25 NOTE — PROGRESS NOTES
membrane. There is no evidence of infection. Well-healed postauricular incision    On the left side the patient has a subtotal perforation of the tympanic membrane. You can see the malleus and it appears to be covered with cholesteatoma. I could not really make out the incudostapedial joint. There is a slight bit of moisture in the middle ear space. ASSESSMENT/PLAN  1. Mixed conductive and sensorineural hearing loss of both ears  This patient has a complicated past ear history has had multiple surgeries on both ears. Her underlying issue is her eustachian tube dysfunction. She has had a lifelong issues with her ears. She understands that there is really no way to completely eliminate all of her ear issues. I told her that in general people should be very conservative operating on her again. I do not think that the right tympanic membrane perforation should be addressed. I think that the underlying station tube dysfunction will make it very unlikely to be able to completely close the tympanic membrane. She does however have a cholesteatoma on the left side. A cholesteatoma is generally progressive and will cause additional hearing loss and perhaps even something worse like meningitis over time. For this reason I do think we need to intervene on the left ear. I have recommended a left tympanoplasty with possible mastoidectomy and ossicular chain reconstruction. She understands that the primary goal of the surgery would be to get rid of the cholesteatoma. The secondary goal will be improving her hearing. Again I think that there is a good chance that even if were successful with removal of the cholesteatoma she will have a persistent perforation afterwards given the amount of times she has had surgery on that ear. Nonetheless I think it is necessary to remove the cholesteatoma. She understands the additional risk of actually making the hearing worse, infection and need for a second look surgery.

## 2023-08-25 NOTE — PROGRESS NOTES
Dariel Garza   1975, 53 y.o. female   [id number]     HEARING AID FITTING      RIGHT EAR: /MODEL: Oticon Real 1 miniRITE-R  SN: [id number]  WIRE/DOME: 2(85)/6mm double bass    LEFT EAR: /MODEL: Oticon Real 1 miniRITE-R  SN: [id number]  WIRE/DOME: 2(85)/6mm double marie     SN: [id number]  HAF: 2023  WARRANTY: 2026    BATTERY: Rechargeable  BUTTONS: Volume Control  PROGRAMS: P1-General  PHONE CONNECTIVITY: Mickie Preston was seen today, 2023   to be fit with Oticon Real 1 miniRITE-R hearing aids. A repeat hearing evaluation was completed which showed stable hearing loss. Please see scanned audiogram in media tab. Patient is scheduled to have surgery on the left ear on . Advised patient there is the possibility of change in hearing post surgery and an audiologic evaluation will need to be completed once cleared for testing by Dr. Pat Donovan. Discussed patient will lose out on the use of her hearing aid during her 30 day trial period as surgery is scheduled during that time frame. Patient understands and wants to proceed with hearing aids at this time. Programmed to level 3 adaptation level. Counseled patient on use and care of devices and on realistic expectations. Device orientation was completed, includin. Hearing aid insertion/removal   2. Buttons/Indicators   3. Rechargeable battery and life expectancy     4. Hearing aid insertion/removal from      5. Cleaning/maintenance of the device     6. Loss & Damage/Repair warranty information   7. 30-day right-to-return period    Tanner Harvey noted good sound quality and comfort with hearing aids. Patient demonstrated proper insertion and removal of devices in office. Completed delivery statement and reviewed 30 day trial period and recall process for programming adjustments.       PATIENT EDUCATION:       Dariel Garza was provided with the Hearing Aid

## 2023-08-28 ENCOUNTER — HOSPITAL ENCOUNTER (OUTPATIENT)
Dept: WOMENS IMAGING | Age: 48
Discharge: HOME | End: 2023-08-28
Payer: COMMERCIAL

## 2023-08-28 VITALS — BODY MASS INDEX: 19.99 KG/M2 | HEIGHT: 65 IN | WEIGHT: 120 LBS

## 2023-08-28 DIAGNOSIS — Z12.31 ENCOUNTER FOR SCREENING MAMMOGRAM FOR BREAST CANCER: ICD-10-CM

## 2023-08-28 PROCEDURE — 77063 BREAST TOMOSYNTHESIS BI: CPT

## 2023-08-28 NOTE — PROGRESS NOTES
Yes, there is plenty of room to increase the dose. I'm glad she is tolerating it, and I suspect she'll find the next dose or 40 mg will be helpful. I sent in new dose to her [facility]. Pls ask pt to see me in 1 mo.  Thx.

## 2023-08-29 NOTE — PROGRESS NOTES
Yadira Riser    Age 53 y.o.    female    1975    MRN [id number]    9/8/2023  Arrival Time_____________  OR Time____________175 Min     Procedure(s):  LEFT TYMPANOPLASTY WITH OSSICULAR CHAIN RECONSTRUCTION; BILATERAL EUSTACHIAN TUBE DILATION                      General   Surgeon(s):  Chapin Cooper, MD      DAY ADMIT ___  SDS/OP ___  OUTPT IN BED ___        Phone [phone] (home)     PCP _____________________ Phone_________________ Epic ( ) Epic CE ( ) Appt ________    ADDITIONAL INFO __________________________________ Cardio/Consult _____________    NOTES _____________________________________________________________________    ____________________________________________________________________________    PAT APPT DATE:________ TIME: ________  FAXED QAD: _______  (__) H&P w/ Hospitalist  __________________________________________________________________________  Preop Nurse phone screen complete: _____________  (__) CBC     (__) W/ DIFF ___________     (__) Hgb A1C    ___________  (__) CHEST X RAY   __________  (__) LIPID PROFILE  ___________  (__) EKG   __________  (__) PT-INR / APTT  ___________  (__) PFT's   __________  (__) BMP   ___________  (__) CAROTIDS  __________  (__) CMP   ___________  (__) VEIN MAPPING  __________  (__) U/A   ___________  (__) HISTORY & PHYSICAL __________  (__) URINE C & S  ___________  (__) CARDIAC CLEARANCE __________  (__) U/A W/ FLEX  ___________  (__) PULM.  CLEARANCE __________  (__) SERUM PREGNANCY ___________  (__) Check Epic DOS orders __________  (__) TYPE & SCREEN __________repeat ( ) (__)  __________________ __________  (__) Albumin / Prealbumin ___________  (__)  __________________ __________  (__) TRANSFERRIN  ___________  (__)  __________________ __________  (__) LIVER PROFILE  ___________  (__)  __________________ __________  (__) MRSA NASAL SWAB ___________  (__) URINE PREG DOS __________  (__) SED RATE  ___________  (__) BLOOD SUGAR

## 2023-08-31 ENCOUNTER — HOSPITAL ENCOUNTER (OUTPATIENT)
Dept: CT IMAGING | Age: 48
Discharge: HOME | End: 2023-08-31
Attending: OTOLARYNGOLOGY
Payer: COMMERCIAL

## 2023-08-31 DIAGNOSIS — H71.92 CHOLESTEATOMA OF LEFT EAR: ICD-10-CM

## 2023-08-31 PROCEDURE — 70480 CT ORBIT/EAR/FOSSA W/O DYE: CPT

## 2023-09-05 ENCOUNTER — PROCEDURE VISIT (OUTPATIENT)
Dept: AUDIOLOGY | Age: 48
End: 2023-09-05

## 2023-09-05 DIAGNOSIS — H90.0 CONDUCTIVE HEARING LOSS, BILATERAL: Primary | ICD-10-CM

## 2023-09-05 PROCEDURE — NBSRV NON-BILLABLE SERVICE: Performed by: AUDIOLOGIST

## 2023-09-05 RX ORDER — PLECANATIDE 3 MG/1
TABLET ORAL PRN
COMMUNITY

## 2023-09-05 RX ORDER — RIMEGEPANT SULFATE 75 MG/75MG
TABLET, ORALLY DISINTEGRATING ORAL PRN
COMMUNITY

## 2023-09-05 NOTE — PROGRESS NOTES
Surgery Date and [email or web address]             Arrival Time:0830    The instructions given when and if a patient needs to stop oral intake prior to surgery varies. Follow the instructions you were given by your surgeon or RN during   preop call. _X_Nothing to eat or to drink after Midnight the night before the surgery. NO gum, mints, candy or ice chips day of surgery. Only take the following medications with a small sip of water the morning of surgery:NA                 Hold the following medications (per anesthesia or surgeon request):NA                 Last Dose:      Aspirin, Ibuprofen, Advil, Naproxen, Vitamin E and other Anti-inflammatory products and supplements should be stopped for 5 -7days before surgery or as  directed     by your physician. Check with your Surgeon/PCP/Cardiologist regarding stopping Plavix, Coumadin, Eliquis, Lovenox, Effient, Pradaxa, Xarelto, Fragmin or other blood thinners and     follow their instructions. Medication:                      Last dose: If you take a long acting-insulin, please ask your Primary Care Physician if you should decrease your dose the night before surgery. Do not smoke or vape, and do not drink any alcoholic beverages 24 hours prior to surgery, this includes NA Beer. Refrain from using any recreational drugs,    including non-prescribed prescription drugs. You may brush your teeth and gargle the morning of surgery. DO NOT SWALLOW WATER. You MUST plan for a responsible adult to stay on site while you are here and take you home after your surgery. You will not be allowed to leave alone or drive               yourself home. It is requested someone stay with you the first 24 hrs. Your surgery will be cancelled if you do not have a ride home with a responsible adult.      A parent/legal guardian must accompany a child scheduled for surgery and plan to stay at the hospital until the child

## 2023-09-05 NOTE — PROGRESS NOTES
Yosef Leahy   1975, 53 y.o. female   [id number]     HEARING AID FITTING FOLLOW-UP    Yosef Leahy was seen today, 9/5/2023,  for first follow-up after being fit with the Oticon Real 1 miniRITE-R hearing aids. She noted discomfort from the left hearing aid in the ear canal and  constantly coming out of the ear canal.  Patient is undergoing left ear surgery tomorrow. Will complete real ear measures after updated audiologic evaluation post surgery. RIGHT EAR: /MODEL: Oticon Real 1 miniRITE-R  SN: [id number]  WIRE/DOME: 2(85)/6mm double bass    LEFT EAR: /MODEL: Oticon Real 1 miniRITE-R  SN: [id number]  WIRE/DOME: 2(85)/5mm double marie     SN: [id number]  HAF: 08/25/2023  WARRANTY: 09/06/2026     BATTERY: Rechargeable  BUTTONS: Volume Control  PROGRAMS: P1-General  PHONE CONNECTIVITY: iPhone    PROCEDURES:     Switched from a 6mm to a 5mm openbass dome and added a retention cord in the left hearing aid    Reviewed cleaning and maintenance of hearing aids. Patient demonstrated she was able to change wax guards and domes in office. Patient Education:       - Verbally and visually reviewed care and maintenance of devices. - Reviewed Hearing Aid Fitting checklist and Troubleshooting document given to patient. Information was shared verbally with patient during appointment. RECOMMENDATIONS:     1. Continue wearing both HAs during all waking hours. 2. Return for follow-up as scheduled. 3. Retest hearing as medically indicated and/or sooner if a change in hearing is noted. No charge for today's appointment.     Kalpesh Washington  Audiologist

## 2023-09-07 ENCOUNTER — ANESTHESIA EVENT (OUTPATIENT)
Dept: OPERATING ROOM | Age: 48
End: 2023-09-07
Payer: COMMERCIAL

## 2023-09-08 ENCOUNTER — ANESTHESIA (OUTPATIENT)
Dept: OPERATING ROOM | Age: 48
End: 2023-09-08
Payer: COMMERCIAL

## 2023-09-08 ENCOUNTER — HOSPITAL ENCOUNTER (OUTPATIENT)
Age: 48
Setting detail: OUTPATIENT SURGERY
Discharge: HOME | End: 2023-09-08
Attending: OTOLARYNGOLOGY | Admitting: OTOLARYNGOLOGY
Payer: COMMERCIAL

## 2023-09-08 VITALS
DIASTOLIC BLOOD PRESSURE: 61 MMHG | WEIGHT: 118 LBS | HEIGHT: 65 IN | BODY MASS INDEX: 19.66 KG/M2 | HEART RATE: 61 BPM | RESPIRATION RATE: 16 BRPM | OXYGEN SATURATION: 100 % | TEMPERATURE: 98.1 F | SYSTOLIC BLOOD PRESSURE: 98 MMHG

## 2023-09-08 DIAGNOSIS — H90.6 MIXED CONDUCTIVE AND SENSORINEURAL HEARING LOSS, BILATERAL: ICD-10-CM

## 2023-09-08 DIAGNOSIS — H71.92 CHOLESTEATOMA OF LEFT MIDDLE EAR: Primary | ICD-10-CM

## 2023-09-08 DIAGNOSIS — H71.92 CHOLESTEATOMA OF LEFT EAR: ICD-10-CM

## 2023-09-08 DIAGNOSIS — R11.0 POSTOPERATIVE NAUSEA: Primary | ICD-10-CM

## 2023-09-08 DIAGNOSIS — H69.83 DYSFUNCTION OF BOTH EUSTACHIAN TUBES: ICD-10-CM

## 2023-09-08 DIAGNOSIS — H72.93: ICD-10-CM

## 2023-09-08 DIAGNOSIS — Z98.890 POSTOPERATIVE NAUSEA: Primary | ICD-10-CM

## 2023-09-08 LAB — HCG UR QL: NEGATIVE

## 2023-09-08 PROCEDURE — 84703 CHORIONIC GONADOTROPIN ASSAY: CPT

## 2023-09-08 PROCEDURE — 2709999900 HC NON-CHARGEABLE SUPPLY: Performed by: OTOLARYNGOLOGY

## 2023-09-08 PROCEDURE — 3600000014 HC SURGERY LEVEL 4 ADDTL 15MIN: Performed by: OTOLARYNGOLOGY

## 2023-09-08 PROCEDURE — C1726 CATH, BAL DIL, NON-VASCULAR: HCPCS | Performed by: OTOLARYNGOLOGY

## 2023-09-08 PROCEDURE — 2500000003 HC RX 250 WO HCPCS: Performed by: NURSE ANESTHETIST, CERTIFIED REGISTERED

## 2023-09-08 PROCEDURE — 21235 EAR CARTILAGE GRAFT: CPT | Performed by: OTOLARYNGOLOGY

## 2023-09-08 PROCEDURE — 7100000001 HC PACU RECOVERY - ADDTL 15 MIN: Performed by: OTOLARYNGOLOGY

## 2023-09-08 PROCEDURE — 3700000000 HC ANESTHESIA ATTENDED CARE: Performed by: OTOLARYNGOLOGY

## 2023-09-08 PROCEDURE — 88304 TISSUE EXAM BY PATHOLOGIST: CPT

## 2023-09-08 PROCEDURE — 69706 NPS SURG DILAT EUST TUBE BI: CPT | Performed by: OTOLARYNGOLOGY

## 2023-09-08 PROCEDURE — 6370000000 HC RX 637 (ALT 250 FOR IP): Performed by: NURSE ANESTHETIST, CERTIFIED REGISTERED

## 2023-09-08 PROCEDURE — 6360000002 HC RX W HCPCS: Performed by: NURSE ANESTHETIST, CERTIFIED REGISTERED

## 2023-09-08 PROCEDURE — 2580000003 HC RX 258: Performed by: NURSE ANESTHETIST, CERTIFIED REGISTERED

## 2023-09-08 PROCEDURE — 2500000003 HC RX 250 WO HCPCS: Performed by: OTOLARYNGOLOGY

## 2023-09-08 PROCEDURE — 6370000000 HC RX 637 (ALT 250 FOR IP): Performed by: ANESTHESIOLOGY

## 2023-09-08 PROCEDURE — 6360000002 HC RX W HCPCS: Performed by: ANESTHESIOLOGY

## 2023-09-08 PROCEDURE — 3600000004 HC SURGERY LEVEL 4 BASE: Performed by: OTOLARYNGOLOGY

## 2023-09-08 PROCEDURE — 3700000001 HC ADD 15 MINUTES (ANESTHESIA): Performed by: OTOLARYNGOLOGY

## 2023-09-08 PROCEDURE — 69631 REPAIR EARDRUM STRUCTURES: CPT | Performed by: OTOLARYNGOLOGY

## 2023-09-08 PROCEDURE — 7100000010 HC PHASE II RECOVERY - FIRST 15 MIN: Performed by: OTOLARYNGOLOGY

## 2023-09-08 PROCEDURE — 7100000000 HC PACU RECOVERY - FIRST 15 MIN: Performed by: OTOLARYNGOLOGY

## 2023-09-08 PROCEDURE — 7100000011 HC PHASE II RECOVERY - ADDTL 15 MIN: Performed by: OTOLARYNGOLOGY

## 2023-09-08 PROCEDURE — 2580000003 HC RX 258: Performed by: ANESTHESIOLOGY

## 2023-09-08 PROCEDURE — 6370000000 HC RX 637 (ALT 250 FOR IP): Performed by: OTOLARYNGOLOGY

## 2023-09-08 RX ORDER — SODIUM CHLORIDE, SODIUM LACTATE, POTASSIUM CHLORIDE, CALCIUM CHLORIDE 600; 310; 30; 20 MG/100ML; MG/100ML; MG/100ML; MG/100ML
INJECTION, SOLUTION INTRAVENOUS CONTINUOUS
Status: DISCONTINUED | OUTPATIENT
Start: 2023-09-08 | End: 2023-09-08 | Stop reason: HOSPADM

## 2023-09-08 RX ORDER — PHENYLEPHRINE HCL IN 0.9% NACL 1 MG/10 ML
SYRINGE (ML) INTRAVENOUS PRN
Status: DISCONTINUED | OUTPATIENT
Start: 2023-09-08 | End: 2023-09-08 | Stop reason: SDUPTHER

## 2023-09-08 RX ORDER — MEPERIDINE HYDROCHLORIDE 50 MG/ML
12.5 INJECTION INTRAMUSCULAR; INTRAVENOUS; SUBCUTANEOUS EVERY 5 MIN PRN
Status: DISCONTINUED | OUTPATIENT
Start: 2023-09-08 | End: 2023-09-08 | Stop reason: HOSPADM

## 2023-09-08 RX ORDER — ONDANSETRON 4 MG/1
4 TABLET, FILM COATED ORAL 3 TIMES DAILY PRN
Qty: 15 TABLET | Refills: 0 | Status: SHIPPED | OUTPATIENT
Start: 2023-09-08

## 2023-09-08 RX ORDER — OXYCODONE HYDROCHLORIDE 5 MG/1
5 TABLET ORAL PRN
Status: COMPLETED | OUTPATIENT
Start: 2023-09-08 | End: 2023-09-08

## 2023-09-08 RX ORDER — CIPROFLOXACIN AND DEXAMETHASONE 3; 1 MG/ML; MG/ML
4 SUSPENSION/ DROPS AURICULAR (OTIC) ONCE
Status: COMPLETED | OUTPATIENT
Start: 2023-09-08 | End: 2023-09-08

## 2023-09-08 RX ORDER — GLYCOPYRROLATE 0.2 MG/ML
INJECTION INTRAMUSCULAR; INTRAVENOUS PRN
Status: DISCONTINUED | OUTPATIENT
Start: 2023-09-08 | End: 2023-09-08 | Stop reason: SDUPTHER

## 2023-09-08 RX ORDER — BACITRACIN ZINC AND POLYMYXIN B SULFATE 500; 1000 [USP'U]/G; [USP'U]/G
OINTMENT TOPICAL
Qty: 1 EACH | Refills: 0 | Status: SHIPPED | OUTPATIENT
Start: 2023-09-08 | End: 2023-09-15

## 2023-09-08 RX ORDER — LIDOCAINE HYDROCHLORIDE 20 MG/ML
INJECTION, SOLUTION EPIDURAL; INFILTRATION; INTRACAUDAL; PERINEURAL PRN
Status: DISCONTINUED | OUTPATIENT
Start: 2023-09-08 | End: 2023-09-08 | Stop reason: SDUPTHER

## 2023-09-08 RX ORDER — SUCCINYLCHOLINE CHLORIDE 20 MG/ML
INJECTION INTRAMUSCULAR; INTRAVENOUS PRN
Status: DISCONTINUED | OUTPATIENT
Start: 2023-09-08 | End: 2023-09-08 | Stop reason: SDUPTHER

## 2023-09-08 RX ORDER — LIDOCAINE HYDROCHLORIDE 40 MG/ML
SOLUTION TOPICAL PRN
Status: DISCONTINUED | OUTPATIENT
Start: 2023-09-08 | End: 2023-09-08 | Stop reason: SDUPTHER

## 2023-09-08 RX ORDER — PROPOFOL 10 MG/ML
INJECTION, EMULSION INTRAVENOUS PRN
Status: DISCONTINUED | OUTPATIENT
Start: 2023-09-08 | End: 2023-09-08 | Stop reason: SDUPTHER

## 2023-09-08 RX ORDER — LIDOCAINE HYDROCHLORIDE 10 MG/ML
2 INJECTION, SOLUTION INFILTRATION; PERINEURAL
Status: DISCONTINUED | OUTPATIENT
Start: 2023-09-08 | End: 2023-09-08 | Stop reason: HOSPADM

## 2023-09-08 RX ORDER — CIPROFLOXACIN AND DEXAMETHASONE 3; 1 MG/ML; MG/ML
4 SUSPENSION/ DROPS AURICULAR (OTIC) 2 TIMES DAILY
Qty: 1 EACH | Refills: 0 | Status: SHIPPED | OUTPATIENT
Start: 2023-09-08 | End: 2023-09-08 | Stop reason: HOSPADM

## 2023-09-08 RX ORDER — DIPHENHYDRAMINE HYDROCHLORIDE 50 MG/ML
12.5 INJECTION INTRAMUSCULAR; INTRAVENOUS
Status: DISCONTINUED | OUTPATIENT
Start: 2023-09-08 | End: 2023-09-08 | Stop reason: HOSPADM

## 2023-09-08 RX ORDER — ONDANSETRON 2 MG/ML
INJECTION INTRAMUSCULAR; INTRAVENOUS PRN
Status: DISCONTINUED | OUTPATIENT
Start: 2023-09-08 | End: 2023-09-08 | Stop reason: SDUPTHER

## 2023-09-08 RX ORDER — OFLOXACIN 3 MG/ML
4 SOLUTION/ DROPS OPHTHALMIC 2 TIMES DAILY
Qty: 1 EACH | Refills: 0 | Status: SHIPPED | OUTPATIENT
Start: 2023-09-08 | End: 2023-09-22

## 2023-09-08 RX ORDER — DEXAMETHASONE SODIUM PHOSPHATE 4 MG/ML
INJECTION, SOLUTION INTRA-ARTICULAR; INTRALESIONAL; INTRAMUSCULAR; INTRAVENOUS; SOFT TISSUE PRN
Status: DISCONTINUED | OUTPATIENT
Start: 2023-09-08 | End: 2023-09-08 | Stop reason: SDUPTHER

## 2023-09-08 RX ORDER — SODIUM CHLORIDE 0.9 % (FLUSH) 0.9 %
5-40 SYRINGE (ML) INJECTION EVERY 12 HOURS SCHEDULED
Status: DISCONTINUED | OUTPATIENT
Start: 2023-09-08 | End: 2023-09-08 | Stop reason: HOSPADM

## 2023-09-08 RX ORDER — OXYCODONE HYDROCHLORIDE 5 MG/1
10 TABLET ORAL PRN
Status: COMPLETED | OUTPATIENT
Start: 2023-09-08 | End: 2023-09-08

## 2023-09-08 RX ORDER — SODIUM CHLORIDE 0.9 % (FLUSH) 0.9 %
5-40 SYRINGE (ML) INJECTION PRN
Status: DISCONTINUED | OUTPATIENT
Start: 2023-09-08 | End: 2023-09-08 | Stop reason: HOSPADM

## 2023-09-08 RX ORDER — REMIFENTANIL HYDROCHLORIDE 1 MG/ML
INJECTION, POWDER, LYOPHILIZED, FOR SOLUTION INTRAVENOUS CONTINUOUS PRN
Status: DISCONTINUED | OUTPATIENT
Start: 2023-09-08 | End: 2023-09-08 | Stop reason: SDUPTHER

## 2023-09-08 RX ORDER — LIDOCAINE HYDROCHLORIDE AND EPINEPHRINE 10; 10 MG/ML; UG/ML
INJECTION, SOLUTION INFILTRATION; PERINEURAL PRN
Status: DISCONTINUED | OUTPATIENT
Start: 2023-09-08 | End: 2023-09-08 | Stop reason: ALTCHOICE

## 2023-09-08 RX ORDER — LABETALOL HYDROCHLORIDE 5 MG/ML
5 INJECTION, SOLUTION INTRAVENOUS EVERY 10 MIN PRN
Status: DISCONTINUED | OUTPATIENT
Start: 2023-09-08 | End: 2023-09-08 | Stop reason: HOSPADM

## 2023-09-08 RX ORDER — FENTANYL CITRATE 50 UG/ML
INJECTION, SOLUTION INTRAMUSCULAR; INTRAVENOUS PRN
Status: DISCONTINUED | OUTPATIENT
Start: 2023-09-08 | End: 2023-09-08 | Stop reason: SDUPTHER

## 2023-09-08 RX ORDER — ONDANSETRON 2 MG/ML
4 INJECTION INTRAMUSCULAR; INTRAVENOUS
Status: DISCONTINUED | OUTPATIENT
Start: 2023-09-08 | End: 2023-09-08 | Stop reason: HOSPADM

## 2023-09-08 RX ORDER — SODIUM CHLORIDE, SODIUM LACTATE, POTASSIUM CHLORIDE, CALCIUM CHLORIDE 600; 310; 30; 20 MG/100ML; MG/100ML; MG/100ML; MG/100ML
INJECTION, SOLUTION INTRAVENOUS CONTINUOUS PRN
Status: DISCONTINUED | OUTPATIENT
Start: 2023-09-08 | End: 2023-09-08 | Stop reason: SDUPTHER

## 2023-09-08 RX ORDER — SODIUM CHLORIDE 9 MG/ML
INJECTION, SOLUTION INTRAVENOUS PRN
Status: DISCONTINUED | OUTPATIENT
Start: 2023-09-08 | End: 2023-09-08 | Stop reason: HOSPADM

## 2023-09-08 RX ORDER — EPHEDRINE SULFATE 50 MG/ML
INJECTION, SOLUTION INTRAVENOUS PRN
Status: DISCONTINUED | OUTPATIENT
Start: 2023-09-08 | End: 2023-09-08 | Stop reason: SDUPTHER

## 2023-09-08 RX ORDER — HYDROCODONE BITARTRATE AND ACETAMINOPHEN 5; 325 MG/1; MG/1
1 TABLET ORAL EVERY 6 HOURS PRN
Qty: 20 TABLET | Refills: 0 | Status: SHIPPED | OUTPATIENT
Start: 2023-09-08 | End: 2023-09-13

## 2023-09-08 RX ADMIN — OXYCODONE HYDROCHLORIDE 10 MG: 5 TABLET ORAL at 13:19

## 2023-09-08 RX ADMIN — DEXMEDETOMIDINE HYDROCHLORIDE 4 MCG: 100 INJECTION, SOLUTION INTRAVENOUS at 10:06

## 2023-09-08 RX ADMIN — SUCCINYLCHOLINE CHLORIDE 120 MG: 20 INJECTION, SOLUTION INTRAMUSCULAR; INTRAVENOUS at 09:52

## 2023-09-08 RX ADMIN — DEXMEDETOMIDINE HYDROCHLORIDE 4 MCG: 100 INJECTION, SOLUTION INTRAVENOUS at 11:26

## 2023-09-08 RX ADMIN — LIDOCAINE HYDROCHLORIDE 40 MG: 20 INJECTION, SOLUTION EPIDURAL; INFILTRATION; INTRACAUDAL; PERINEURAL at 09:52

## 2023-09-08 RX ADMIN — Medication 100 MCG: at 10:24

## 2023-09-08 RX ADMIN — PROPOFOL 120 MG: 10 INJECTION, EMULSION INTRAVENOUS at 09:52

## 2023-09-08 RX ADMIN — DEXAMETHASONE SODIUM PHOSPHATE 10 MG: 4 INJECTION, SOLUTION INTRAMUSCULAR; INTRAVENOUS at 09:54

## 2023-09-08 RX ADMIN — DEXMEDETOMIDINE HYDROCHLORIDE 8 MCG: 100 INJECTION, SOLUTION INTRAVENOUS at 09:55

## 2023-09-08 RX ADMIN — DEXMEDETOMIDINE HYDROCHLORIDE 4 MCG: 100 INJECTION, SOLUTION INTRAVENOUS at 11:20

## 2023-09-08 RX ADMIN — FENTANYL CITRATE 50 MCG: 50 INJECTION, SOLUTION INTRAMUSCULAR; INTRAVENOUS at 09:56

## 2023-09-08 RX ADMIN — HYDROMORPHONE HYDROCHLORIDE 0.5 MG: 1 INJECTION, SOLUTION INTRAMUSCULAR; INTRAVENOUS; SUBCUTANEOUS at 12:14

## 2023-09-08 RX ADMIN — GLYCOPYRROLATE 0.2 MG: 0.2 INJECTION, SOLUTION INTRAMUSCULAR; INTRAVENOUS at 10:31

## 2023-09-08 RX ADMIN — EPHEDRINE SULFATE 5 MG: 50 INJECTION INTRAMUSCULAR; INTRAVENOUS; SUBCUTANEOUS at 10:45

## 2023-09-08 RX ADMIN — LIDOCAINE HYDROCHLORIDE 2.5 ML: 40 SOLUTION TOPICAL at 09:53

## 2023-09-08 RX ADMIN — DEXMEDETOMIDINE HYDROCHLORIDE 4 MCG: 100 INJECTION, SOLUTION INTRAVENOUS at 11:23

## 2023-09-08 RX ADMIN — FENTANYL CITRATE 50 MCG: 50 INJECTION, SOLUTION INTRAMUSCULAR; INTRAVENOUS at 09:47

## 2023-09-08 RX ADMIN — Medication 0.15 MCG/KG/MIN: at 09:52

## 2023-09-08 RX ADMIN — SODIUM CHLORIDE, SODIUM LACTATE, POTASSIUM CHLORIDE, AND CALCIUM CHLORIDE: .6; .31; .03; .02 INJECTION, SOLUTION INTRAVENOUS at 09:56

## 2023-09-08 RX ADMIN — FENTANYL CITRATE 100 MCG: 50 INJECTION, SOLUTION INTRAMUSCULAR; INTRAVENOUS at 09:52

## 2023-09-08 RX ADMIN — DEXMEDETOMIDINE HYDROCHLORIDE 4 MCG: 100 INJECTION, SOLUTION INTRAVENOUS at 09:50

## 2023-09-08 RX ADMIN — Medication 100 MCG: at 10:30

## 2023-09-08 RX ADMIN — SODIUM CHLORIDE, SODIUM LACTATE, POTASSIUM CHLORIDE, AND CALCIUM CHLORIDE: .6; .31; .03; .02 INJECTION, SOLUTION INTRAVENOUS at 09:47

## 2023-09-08 RX ADMIN — Medication 50 MCG: at 10:21

## 2023-09-08 RX ADMIN — Medication 100 MCG: at 10:41

## 2023-09-08 RX ADMIN — DEXMEDETOMIDINE HYDROCHLORIDE 4 MCG: 100 INJECTION, SOLUTION INTRAVENOUS at 10:03

## 2023-09-08 RX ADMIN — EPHEDRINE SULFATE 5 MG: 50 INJECTION INTRAMUSCULAR; INTRAVENOUS; SUBCUTANEOUS at 11:00

## 2023-09-08 RX ADMIN — Medication 100 MCG: at 10:13

## 2023-09-08 RX ADMIN — DEXMEDETOMIDINE HYDROCHLORIDE 4 MCG: 100 INJECTION, SOLUTION INTRAVENOUS at 09:59

## 2023-09-08 RX ADMIN — DEXMEDETOMIDINE HYDROCHLORIDE 4 MCG: 100 INJECTION, SOLUTION INTRAVENOUS at 09:47

## 2023-09-08 RX ADMIN — ONDANSETRON 4 MG: 2 INJECTION INTRAMUSCULAR; INTRAVENOUS at 13:18

## 2023-09-08 RX ADMIN — ONDANSETRON 4 MG: 2 INJECTION INTRAMUSCULAR; INTRAVENOUS at 11:20

## 2023-09-08 ASSESSMENT — PAIN DESCRIPTION - ORIENTATION
ORIENTATION: LEFT
ORIENTATION: RIGHT
ORIENTATION: LEFT

## 2023-09-08 ASSESSMENT — PAIN DESCRIPTION - LOCATION
LOCATION: EAR

## 2023-09-08 ASSESSMENT — PAIN SCALES - GENERAL
PAINLEVEL_OUTOF10: 7
PAINLEVEL_OUTOF10: 5
PAINLEVEL_OUTOF10: 10

## 2023-09-08 ASSESSMENT — PAIN - FUNCTIONAL ASSESSMENT: PAIN_FUNCTIONAL_ASSESSMENT: 0-10

## 2023-09-08 NOTE — ANESTHESIA POSTPROCEDURE EVALUATION
Department of Anesthesiology  Postprocedure Note    Patient: Cindy Alvarez  MRN: [id number]  YOB: 1975  Date of evaluation: 9/8/2023      Procedure Summary     Date: 09/08/23 Room / Location: [facility] OR [address] / [address] [place] [address]    Anesthesia Start: 8469 Anesthesia Stop: 1019    Procedure: LEFT TYMPANOPLASTY WITH AURICULAR CARTILAGE GRAFT; BILATERAL EUSTACHIAN TUBE DILATION (Ear) Diagnosis:       Mixed conductive and sensorineural hearing loss, bilateral      Cholesteatoma of left ear      Rupture of both tympanic membranes      Dysfunction of both eustachian tubes      (Mixed conductive and sensorineural hearing loss, bilateral [H90.6])      (Cholesteatoma of left ear [H71.92])      (Rupture of both tympanic membranes [H72.93])      (Dysfunction of both eustachian tubes [H69.83])    Surgeons: Poppy Garcia MD Responsible Provider: Abigail Mota MD    Anesthesia Type: general ASA Status: 2          Anesthesia Type: No value filed. Tracy Phase I: Tracy Score: 10    Tracy Phase II: Tracy Score: 10      Anesthesia Post Evaluation    Patient location during evaluation: PACU  Patient participation: complete - patient participated  Level of consciousness: awake and alert  Airway patency: patent  Nausea & Vomiting: no nausea and no vomiting  Complications: no  Cardiovascular status: blood pressure returned to baseline  Respiratory status: acceptable  Hydration status: euvolemic  Comments: VSS on transfer to phase 2 recovery. No anesthetic complications.   Pain management: adequate

## 2023-09-08 NOTE — DISCHARGE INSTRUCTIONS
Discharge Instructions    Steps to [address] may take the head wrap off tomorrow and shower, but place a fresh cotton ball to keep water out  Do not scrub incision  Physical Activity   During your recovery:   Light activity (no lifting more than 15 pounds) for 1 week. Plan on 1 week off of work. No nose blowing for 1 week    Medications   Dr. Pat Donovan   Antibiotic ointment to incision behind the ear and antibiotic drops in ear twice a day starting tomorrow. Follow these general medication guidelines:   Take your medication as directed. Do not change the amount or schedule. Do not share your medication with anyone. Medications can be dangerous when mixed. Talk to your doctor if you are taking more than one medication, including over-the-counter products and supplements. If you had to stop medications before surgery, ask your doctor when you can start again. Follow-up  2 weeks  Call Your Doctor If Any of the Following Occur ([phone])  Contact your doctor if your recovery is not progressing as expected or you develop complications, such as:  Signs of infection, including fever and chills  Pain that you cannot control with the medications that you have been given  Redness, swelling, increasing pain, excessive bleeding from ear  Excessive dizziness (room spinning)    If you think you have an emergency, call for medical help right away. ANESTHESIA DISCHARGE INSTRUCTIONS    You are under the influence of drugs- do not drink alcohol, drive a car, operate machinery(such as power tools, kitchen appliances, etc), sign legal documents, or make any important decisions for 24 hours (or while on pain medications). Children should not ride bikes or [place] or play on gym sets  for 24 hours after surgery. A responsible adult should be with you for 24 hours. Rest at home today- increase activity as tolerated. Progress slowly to a regular diet unless your physician has instructed you otherwise. instructions are to be followed if you have a known history or diagnosis of sleep apnea: For all sleep apnea patients:  ? Sleep on your side or sitting up in a chair whenever possible, especially the first 24 hours after surgery. ? Use only medicines prescribed by your doctor. ? Do not drink alcohol. ? If you have a dental device to assist you while at rest, use it at all times for the first 24 hours. For patients using CPAP machines:  ? Use your CPAP machine during all periods of sleep as usual.  ? Use your CPAP machine during all periods of daytime rest while on pain medicines. ** Follow up with your primary care doctor for continued care. IF YOU DO NOT TAKE ALL OF YOUR NARCOTIC PAIN MEDICATION, please dispose of them responsibly. There are drop off boxes in the Emergency Departments 24/7 at both [facility] and [facility]. If these locations are not convenient, other options for discarding them can be found at:  [email or web address]    Hospital or office staff may NOT accept any medications to drop off in the cabinet for you.

## 2023-09-08 NOTE — OP NOTE
[facility] SURGERY  OPERATIVE REPORT    Patient Name: Corky Landaverde  YOB: 1975  Medical Record Number:  [id number]  Billing Number:  [id number]  Date of Procedure: [unfilled]  Time: 4866    Pre Operative Diagnoses:   Problem List Items Addressed This Visit          Other    Cholesteatoma of left middle ear - Primary    Relevant Medications    HYDROcodone-acetaminophen (NORCO) 5-325 MG per tablet     Other Visit Diagnoses       Mixed conductive and sensorineural hearing loss, bilateral        Relevant Orders    Surgical Pathology    Cholesteatoma of left ear        Relevant Orders    Surgical Pathology    Rupture of both tympanic membranes        Relevant Orders    Surgical Pathology    Dysfunction of both eustachian tubes        Relevant Orders    Surgical Pathology          Post Operative Diagnoses:    Problem List Items Addressed This Visit          Other    Cholesteatoma of left middle ear - Primary    Relevant Medications    HYDROcodone-acetaminophen ([address]) 5-325 MG per tablet     Other Visit Diagnoses       Mixed conductive and sensorineural hearing loss, bilateral        Relevant Orders    Surgical Pathology    Cholesteatoma of left ear        Relevant Orders    Surgical Pathology    Rupture of both tympanic membranes        Relevant Orders    Surgical Pathology    Dysfunction of both eustachian tubes        Relevant Orders    Surgical Pathology                   Procedure:  left tympanoplasty (81541)   Left auricular cartilage graft (29237)  Bilateral eustachian tube dilation (09709)       Surgeon: Carlos Delcid MD    OR Staff/ Assistant:  Circulator: Paul Nguyễn RN  Surgical Assistant: Mallika Wyatt Person First: Janak De Anda; Mick Simons    Anesthesia:  General anesthesia. Findings:  1) subtotal left tympanic membrane perforation  2.   Cholesteatoma that was essentially isolated to the manubrium of the malleus anteriorly. It appears as though the cholesteatoma was mostly isolated to a small area in the mesotympanum as well as the malleus. The manubrium of the malleus was clipped and removed just inferior to the tensor tympani tendon. This seemed to remove all of the cholesteatoma with it. Palpation of the residual malleus suggested that the ossicular chain was still intact. An incision was then made into the superior aspect of the postauricular region through a previous scar. A temporalis graft was harvested and freshened. Antibiotic drops Gelfoam were placed in the middle ear and the temporalis graft was placed medial to the residual tympanic membrane to cover the perforation. Given the number of different surgeries and the fact that we removed a lot of the malleus a separate incision was made into the posterior aspect of the bailey and a circular chondral cartilage graft was taken. This was split. This was placed medial to the temporalis fascia to bolster the reconstruction. Next the cartilage, fascial and residual tympanic membrane were reflected into place. The circumference of the perforation was covered. Antibiotic soaked Gelfoam were placed lateral to this followed by Bactroban ointment. The postauricular incision was closed with simple interrupted deep 3-0 Vicryl sutures followed by running 5-0 fast.  The posterior bailey incision was closed with a simple running 3-0 Vicryl suture. The head of bed was then rotated back 180 degrees. The patient was allowed to awaken, extubated and taken recovery in stable condition. I attest that I was present for and did the entire procedure myself. Estimated Blood Loss: 25 mL                 Specimens:   ID Type Source Tests Collected by Time Destination   A : left middle ear content Specimen Ear SURGICAL PATHOLOGY Tammy Donnelly MD 9/8/2023 5427         Complications: There were no complications.     Tammy Donnelly MD

## 2023-09-08 NOTE — PROGRESS NOTES
Received pt from PACU to [facility] phase 2. Pt c/o level 7/10 post op pain left ear and nausea. Medicated with zofran and oxycodone as ordered. Taking po fluids and crackers. Family member at bedside.

## 2023-09-08 NOTE — PROGRESS NOTES
Patient arrived to PACU bay 4, phase one initiated. Placed on bedside monitor, VSS. Report obtained from OR RN and anesthesia. Patient on O2 via simple face make at 6 L and oral airway in place. Side rails in place. Warm blankets applied.

## 2023-09-08 NOTE — H&P
[facility]  H&P        Patient Name: Sarai [facility] Record Number:  [id number]  Primary Care Physician:  Miranda Garcia MD  Date of Consultation: 8/25/2023     Chief Complaint: Ear issues       Update 9/8/23:  No changes since last visit. She understands the risk of a left tympanoplasty and eustachian tube dilation. She has agreed to proceed. Interval History     This is a patient with a complicated past ear history. She has been followed by my partner Nakia Lang recently, but has seen multiple different ear nose and throat doctors. She has had multiple surgeries on both ears. Her last surgery was around 2018 and it was a tympanoplasty on the left side. She has a history of a mastoidectomy and tympanoplasty on the right side when she was a kid. She has a persistent perforation on both ears. She also has a cholesteatoma on the left side. She is here to discuss possible surgical intervention. Overall she has occasional discomfort on the left side and some drainage. She thinks her hearing probably is getting worse slowly. REVIEW OF SYSTEMS  As above     PHYSICAL EXAM  GENERAL: No Acute Distress, Alert and Oriented, no Hoarseness, strong voice  EYES: EOMI, Anti-icteric  HENT:   Head: Normocephalic and atraumatic. Face:  Symmetric, facial nerve intact, no sinus tenderness  Ears: See below  Mouth/Oral Cavity:  normal lips, Uvula is midline, no mucosal lesions, no trismus  Oropharynx/Larynx:  normal oropharynx,  Nose:Normal external nasal appearance. NECK: Normal range of motion, no thyromegaly, trachea is midline, no lymphadenopathy, no neck masses, no crepitus           REVIEW OF MEDICAL RECORDS  I reviewed her records from Dr. Maryann Miranda and Nakia Lang.           Her last audiogram showed moderate sloping to almost normal and then back to moderate severe bilateral mixed hearing loss with type B tympanograms Procedure  Bilateral ear exam  The right ear was visualized binocular scope. She has a small posterior perforation with a dry middle ear. She has scarring the rest tympanic membrane. There is no evidence of infection. Well-healed postauricular incision     On the left side the patient has a subtotal perforation of the tympanic membrane. You can see the malleus and it appears to be covered with cholesteatoma. I could not really make out the incudostapedial joint. There is a slight bit of moisture in the middle ear space. ASSESSMENT/PLAN  1. Mixed conductive and sensorineural hearing loss of both ears  This patient has a complicated past ear history has had multiple surgeries on both ears. Her underlying issue is her eustachian tube dysfunction. She has had a lifelong issues with her ears. She understands that there is really no way to completely eliminate all of her ear issues. I told her that in general people should be very conservative operating on her again. I do not think that the right tympanic membrane perforation should be addressed. I think that the underlying station tube dysfunction will make it very unlikely to be able to completely close the tympanic membrane. She does however have a cholesteatoma on the left side. A cholesteatoma is generally progressive and will cause additional hearing loss and perhaps even something worse like meningitis over time. For this reason I do think we need to intervene on the left ear. I have recommended a left tympanoplasty with possible mastoidectomy and ossicular chain reconstruction. She understands that the primary goal of the surgery would be to get rid of the cholesteatoma. The secondary goal will be improving her hearing. Again I think that there is a good chance that even if were successful with removal of the cholesteatoma she will have a persistent perforation afterwards given the amount of times she has had surgery on that ear.

## 2023-09-13 ENCOUNTER — TELEMEDICINE (OUTPATIENT)
Dept: FAMILY MEDICINE CLINIC | Age: 48
End: 2023-09-13
Payer: COMMERCIAL

## 2023-09-13 DIAGNOSIS — F41.9 ANXIETY: Primary | ICD-10-CM

## 2023-09-13 DIAGNOSIS — F33.9 EPISODE OF RECURRENT MAJOR DEPRESSIVE DISORDER, UNSPECIFIED DEPRESSION EPISODE SEVERITY (HCC): ICD-10-CM

## 2023-09-13 PROCEDURE — 99213 OFFICE O/P EST LOW 20 MIN: CPT | Performed by: STUDENT IN AN ORGANIZED HEALTH CARE EDUCATION/TRAINING PROGRAM

## 2023-09-13 RX ORDER — FLUOXETINE HYDROCHLORIDE 20 MG/1
20 CAPSULE ORAL DAILY
Qty: 30 CAPSULE | Refills: 3 | Status: SHIPPED | OUTPATIENT
Start: 2023-09-13

## 2023-09-13 NOTE — PROGRESS NOTES
Jose Casey, was evaluated through a synchronous (real-time) audio-video encounter. The patient (or guardian if applicable) is aware that this is a billable service, which includes applicable co-pays. This Virtual Visit was conducted with patient's (and/or legal guardian's) consent. Patient identification was verified, and a caregiver was present when appropriate. The patient was located at Home: [address] [place] [address]. [address]  Provider was located at [facility] (Appt Dept): Thomas Alireza,  [address] [place] [address] (:  1975) is a Established patient, presenting virtually for evaluation of the following:    Assessment & Plan   Below is the assessment and plan developed based on review of pertinent history, physical exam, labs, studies, and medications. 1. Anxiety  -     FLUoxetine (PROZAC) 20 MG capsule; Take 1 capsule by mouth daily, Disp-30 capsule, R-3Normal  2. Episode of recurrent major depressive disorder, unspecified depression episode severity (HCC)  -     FLUoxetine (PROZAC) 20 MG capsule; Take 1 capsule by mouth daily, Disp-30 capsule, R-3Normal  Chronic. Not controlled. Patient's anxiety and depression may benefit from restarting Prozac. We will start at a moderate dose. Patient will follow-up in 4 to 6 weeks with her PCP. No follow-ups on file. Subjective   Depression  Anxiety        Patient is a 71-year-old female who presents over the telephone for A/V communication to discuss recent mood symptoms. Patient has a history of major depressive disorder, but has not been on medication for this for several years. Patient has had a lot of recent stressors in her life including divorce, moving, and recently having ear surgery. She finds that baseline coping strategies have not been able to work given all of these recent life stressors and she feels like her depression and anxiety is getting the better of her.   The patient states that she

## 2023-09-21 ENCOUNTER — OFFICE VISIT (OUTPATIENT)
Dept: ENT CLINIC | Age: 48
End: 2023-09-21

## 2023-09-21 VITALS — HEIGHT: 65 IN | BODY MASS INDEX: 19.64 KG/M2

## 2023-09-21 DIAGNOSIS — H72.93 PERFORATION OF BOTH TYMPANIC MEMBRANES: ICD-10-CM

## 2023-09-21 DIAGNOSIS — H71.92 CHOLESTEATOMA OF LEFT EAR: Primary | ICD-10-CM

## 2023-09-21 PROCEDURE — 99024 POSTOP FOLLOW-UP VISIT: CPT | Performed by: OTOLARYNGOLOGY

## 2023-09-21 NOTE — PROGRESS NOTES
Patient is following up from her left tympanoplasty and eustachian tube dilation. We did this on September 8, 2023. Overall she is doing okay. She does taste the eardrops when she uses them on the left side. Exam  The right ear she has a stable perforation with a dry middle ear. On the left side I removed the stitches in the posterior bailey area. She has some packing medially that I left in place. There is no evidence of infection    Plan  She appears to be doing well. I would like for her to use eardrops once daily until the bottle is empty.   I would like to see her in 3 months with a new audiogram.

## 2023-10-09 DIAGNOSIS — F33.9 EPISODE OF RECURRENT MAJOR DEPRESSIVE DISORDER, UNSPECIFIED DEPRESSION EPISODE SEVERITY (HCC): ICD-10-CM

## 2023-10-09 DIAGNOSIS — F41.9 ANXIETY: ICD-10-CM

## 2023-10-09 RX ORDER — FLUOXETINE HYDROCHLORIDE 20 MG/1
20 CAPSULE ORAL DAILY
Qty: 30 CAPSULE | Refills: 5 | Status: SHIPPED | OUTPATIENT
Start: 2023-10-09

## 2023-10-09 NOTE — TELEPHONE ENCOUNTER
Last Office Visit  -  9/13/2023  Next Office Visit  -  110/23/2023    Last Filled  -  9/13/2023  Last UDS -    Contract -

## 2023-10-29 ENCOUNTER — PATIENT MESSAGE (OUTPATIENT)
Dept: FAMILY MEDICINE CLINIC | Age: 48
End: 2023-10-29

## 2023-10-30 RX ORDER — BUPROPION HYDROCHLORIDE 150 MG/1
150 TABLET ORAL EVERY MORNING
Qty: 30 TABLET | Refills: 2 | Status: SHIPPED | OUTPATIENT
Start: 2023-10-30

## 2023-10-30 NOTE — TELEPHONE ENCOUNTER
From: Mira Cruz  To: Dr. Maryann Dobson: 10/29/2023 4:15 PM EDT  Subject: Ozempic/Welbutrin    I wanted to see if I could start on Ozempic and welbutrin I have an appointment with Dr. Trudy Garcia on the 13th    my obsessive compulsive behaviors with binge eating and cravings are out of control.     i am currently not taking anything

## 2023-11-08 ENCOUNTER — TELEPHONE (OUTPATIENT)
Dept: FAMILY MEDICINE CLINIC | Age: 48
End: 2023-11-08

## 2023-11-08 NOTE — TELEPHONE ENCOUNTER
Patient contacted the office very emotional and crying on the phone. States that her son does not want to live with her anymore. States that her Wellbutrin is not working for her. Patient is very depressed. Expressed that she may need an appt to adjust medications, since patient was so emotional on phone  a phone call may be advisable  at this time, please advise [phone]

## 2023-11-08 NOTE — TELEPHONE ENCOUNTER
Patient phoned checking on status of message. She was given Dr Guidry's response. Patient was scheduled with Dr Goodman on 11/9/23.    Patient states the Wellbutrin is helping her get out of bed but not with the extreme sadness. She feels like she need another medication added.

## 2023-11-08 NOTE — TELEPHONE ENCOUNTER
Yakelin, pls give pt a call and let her know that I suggest she see Dr Goodman, our new psychiatrist who works in our office. She is very kind, and I think Suzanne will relate to her. Pls offer appt. Pls also let pt know that I'd like her to reconnect with Dr Hernandez, unless she is seeing another therapist. Pls arrange Dr Hernandez appt also. Thanks!

## 2023-11-09 ENCOUNTER — TELEMEDICINE (OUTPATIENT)
Dept: PSYCHIATRY | Age: 48
End: 2023-11-09
Payer: COMMERCIAL

## 2023-11-09 DIAGNOSIS — F50.2 BULIMIA NERVOSA, PURGING TYPE: ICD-10-CM

## 2023-11-09 DIAGNOSIS — F33.2 MDD (MAJOR DEPRESSIVE DISORDER), RECURRENT SEVERE, WITHOUT PSYCHOSIS (HCC): Primary | ICD-10-CM

## 2023-11-09 DIAGNOSIS — F50.2 BULIMIA NERVOSA: ICD-10-CM

## 2023-11-09 PROCEDURE — 99205 OFFICE O/P NEW HI 60 MIN: CPT | Performed by: STUDENT IN AN ORGANIZED HEALTH CARE EDUCATION/TRAINING PROGRAM

## 2023-11-09 RX ORDER — ESCITALOPRAM OXALATE 10 MG/1
10 TABLET ORAL DAILY
Qty: 30 TABLET | Refills: 1 | Status: SHIPPED | OUTPATIENT
Start: 2023-11-09

## 2023-11-09 ASSESSMENT — PATIENT HEALTH QUESTIONNAIRE - PHQ9
4. FEELING TIRED OR HAVING LITTLE ENERGY: 3
2. FEELING DOWN, DEPRESSED OR HOPELESS: 3
9. THOUGHTS THAT YOU WOULD BE BETTER OFF DEAD, OR OF HURTING YOURSELF: 2
1. LITTLE INTEREST OR PLEASURE IN DOING THINGS: 3
SUM OF ALL RESPONSES TO PHQ9 QUESTIONS 1 & 2: 6
8. MOVING OR SPEAKING SO SLOWLY THAT OTHER PEOPLE COULD HAVE NOTICED. OR THE OPPOSITE, BEING SO FIGETY OR RESTLESS THAT YOU HAVE BEEN MOVING AROUND A LOT MORE THAN USUAL: 0
SUM OF ALL RESPONSES TO PHQ QUESTIONS 1-9: 23
5. POOR APPETITE OR OVEREATING: 3
SUM OF ALL RESPONSES TO PHQ QUESTIONS 1-9: 23
SUM OF ALL RESPONSES TO PHQ QUESTIONS 1-9: 23
SUM OF ALL RESPONSES TO PHQ QUESTIONS 1-9: 21
3. TROUBLE FALLING OR STAYING ASLEEP: 3
7. TROUBLE CONCENTRATING ON THINGS, SUCH AS READING THE NEWSPAPER OR WATCHING TELEVISION: 3
6. FEELING BAD ABOUT YOURSELF - OR THAT YOU ARE A FAILURE OR HAVE LET YOURSELF OR YOUR FAMILY DOWN: 3

## 2023-11-09 ASSESSMENT — ANXIETY QUESTIONNAIRES
2. NOT BEING ABLE TO STOP OR CONTROL WORRYING: 3
5. BEING SO RESTLESS THAT IT IS HARD TO SIT STILL: 3
1. FEELING NERVOUS, ANXIOUS, OR ON EDGE: 0
GAD7 TOTAL SCORE: 15
7. FEELING AFRAID AS IF SOMETHING AWFUL MIGHT HAPPEN: 0
3. WORRYING TOO MUCH ABOUT DIFFERENT THINGS: 3
4. TROUBLE RELAXING: 3
6. BECOMING EASILY ANNOYED OR IRRITABLE: 3

## 2023-11-09 NOTE — PROGRESS NOTES
New Patient is establishing virtually today, and would like to address depression. Referred By: Dr. Brady Fishman   Work:   Family:  Children:  Education:  Legal History or Arrest:  Social Support:  Access to Firearms or Weapons:  Pets:  Hobbies:  Abuse Trauma History:   Plans or Goals:   New Symptoms:   Medicinal Marijuana or Controlled Substances:  Any Attempted Suicide:   Any Previous Psychiatric Admissions:  Previous Diagnosis:   Family Psychiatric History:     PHQ:  NICHOL:    Psychiatric Medications tried in past :
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
## 2023-11-13 ENCOUNTER — TELEMEDICINE (OUTPATIENT)
Dept: PSYCHOLOGY | Age: 48
End: 2023-11-13
Payer: COMMERCIAL

## 2023-11-13 DIAGNOSIS — F41.1 GAD (GENERALIZED ANXIETY DISORDER): ICD-10-CM

## 2023-11-13 DIAGNOSIS — F33.1 MDD (MAJOR DEPRESSIVE DISORDER), RECURRENT EPISODE, MODERATE (HCC): Primary | ICD-10-CM

## 2023-11-13 PROCEDURE — 90832 PSYTX W PT 30 MINUTES: CPT | Performed by: PSYCHOLOGIST

## 2023-11-13 NOTE — PATIENT INSTRUCTIONS
Aim to get out of the house at least once a day. Maintain social contact daily. Return to see Dr. Basilio Morales next week.

## 2023-11-13 NOTE — PROGRESS NOTES
Behavioral Health Consultation  Erma Ahumada, Ph.D.  Psychologist  11/13/2023  4:38 PM EST      Time spent with Patient: 25 minutes  This is patient's third [facility] appointment. Reason for Consult:    Chief Complaint   Patient presents with    Depression    Anxiety     Referring Provider: Priyanka Deal MD  [address]  [address] [place] [address],  [address] [place] [address]    Pt provided informed consent for the behavioral health program. Discussed with patient model of service to include the limits of confidentiality (i.e. abuse reporting, suicide intervention, etc.) and short-term intervention focused approach. Pt indicated understanding. Feedback given to PCP. TELEHEALTH VISIT -- Audio/Visual (During [facility] public health emergency)    Services were provided through a video synchronous discussion virtually to substitute for in-person clinic visit. Pt gave verbal informed consent to participate in telehealth services. Conducted a risk-benefit analysis and determined that the patient's presenting problems are consistent with the use of telepsychology. Determined that the patient has sufficient knowledge and skills in the use of technology enabling them to adequately benefit from telepsychology. It was determined that this patient was able to be properly treated without an in-person session. Patient verified that they were currently located at the [place] address that was provided during registration. Verified the following information:  Patient's identification: Yes  Patient location: [address] [place] [address].    [id number]  [address] [place] [address]   Patient's call back number: [phone]   Patient's emergency contact's name and number, as well as permission to contact them if needed: Extended Emergency Contact Information  Primary Emergency Contact: Heriberto Casper  Address: [address] [place] [address] of [address] [facility] Phone: [phone]  Mobile Phone: [phone]  Relation: Spouse     Provider location: [place], [place]

## 2023-12-04 ENCOUNTER — TELEPHONE (OUTPATIENT)
Dept: FAMILY MEDICINE CLINIC | Age: 48
End: 2023-12-04

## 2023-12-04 RX ORDER — ESCITALOPRAM OXALATE 20 MG/1
20 TABLET ORAL DAILY
Qty: 90 TABLET | Refills: 1 | Status: SHIPPED | OUTPATIENT
Start: 2023-12-04 | End: 2023-12-12 | Stop reason: SINTOL

## 2023-12-04 NOTE — TELEPHONE ENCOUNTER
I called Tommie John and scheduled her follow up appointment, in the meantime the pharmacy is asking for a 90 day supply of the new dose, 20?

## 2023-12-12 ENCOUNTER — OFFICE VISIT (OUTPATIENT)
Dept: PSYCHIATRY | Age: 48
End: 2023-12-12

## 2023-12-12 VITALS
BODY MASS INDEX: 20.83 KG/M2 | WEIGHT: 125 LBS | HEIGHT: 65 IN | DIASTOLIC BLOOD PRESSURE: 64 MMHG | HEART RATE: 72 BPM | SYSTOLIC BLOOD PRESSURE: 90 MMHG

## 2023-12-12 DIAGNOSIS — F33.2 MDD (MAJOR DEPRESSIVE DISORDER), RECURRENT SEVERE, WITHOUT PSYCHOSIS (HCC): ICD-10-CM

## 2023-12-12 DIAGNOSIS — F50.2 BULIMIA NERVOSA: Primary | ICD-10-CM

## 2023-12-12 RX ORDER — FLUOXETINE HYDROCHLORIDE 20 MG/1
CAPSULE ORAL
Qty: 60 CAPSULE | Refills: 2 | Status: SHIPPED | OUTPATIENT
Start: 2023-12-12

## 2023-12-12 ASSESSMENT — PATIENT HEALTH QUESTIONNAIRE - PHQ9
SUM OF ALL RESPONSES TO PHQ QUESTIONS 1-9: 21
7. TROUBLE CONCENTRATING ON THINGS, SUCH AS READING THE NEWSPAPER OR WATCHING TELEVISION: 3
SUM OF ALL RESPONSES TO PHQ QUESTIONS 1-9: 21
6. FEELING BAD ABOUT YOURSELF - OR THAT YOU ARE A FAILURE OR HAVE LET YOURSELF OR YOUR FAMILY DOWN: 3
9. THOUGHTS THAT YOU WOULD BE BETTER OFF DEAD, OR OF HURTING YOURSELF: 0
8. MOVING OR SPEAKING SO SLOWLY THAT OTHER PEOPLE COULD HAVE NOTICED. OR THE OPPOSITE, BEING SO FIGETY OR RESTLESS THAT YOU HAVE BEEN MOVING AROUND A LOT MORE THAN USUAL: 0
4. FEELING TIRED OR HAVING LITTLE ENERGY: 3
3. TROUBLE FALLING OR STAYING ASLEEP: 3
2. FEELING DOWN, DEPRESSED OR HOPELESS: 3
5. POOR APPETITE OR OVEREATING: 3
1. LITTLE INTEREST OR PLEASURE IN DOING THINGS: 3
SUM OF ALL RESPONSES TO PHQ9 QUESTIONS 1 & 2: 6
SUM OF ALL RESPONSES TO PHQ QUESTIONS 1-9: 21
SUM OF ALL RESPONSES TO PHQ QUESTIONS 1-9: 21

## 2023-12-12 ASSESSMENT — ANXIETY QUESTIONNAIRES
2. NOT BEING ABLE TO STOP OR CONTROL WORRYING: 3
4. TROUBLE RELAXING: 3
6. BECOMING EASILY ANNOYED OR IRRITABLE: 3
3. WORRYING TOO MUCH ABOUT DIFFERENT THINGS: 3
1. FEELING NERVOUS, ANXIOUS, OR ON EDGE: 3
5. BEING SO RESTLESS THAT IT IS HARD TO SIT STILL: 3
7. FEELING AFRAID AS IF SOMETHING AWFUL MIGHT HAPPEN: 0
GAD7 TOTAL SCORE: 18
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
## 2023-12-12 NOTE — PATIENT INSTRUCTIONS
Therapy services that might have later appointments:  [facility] [phone]  [facility] [phone] or [phone]

## 2023-12-12 NOTE — PROGRESS NOTES
Patient is following up today via in person and would like to address: 1. Medication not effective and irritability   2. Eating disorder regressing  3. Migraines     PHQ:21  NICHOL:18    Previous Scores from 11.9.23  PHQ:23  NICHOL:15

## 2023-12-29 ENCOUNTER — TELEPHONE (OUTPATIENT)
Dept: FAMILY MEDICINE CLINIC | Age: 48
End: 2023-12-29

## 2023-12-29 NOTE — TELEPHONE ENCOUNTER
I left Suzanne a VM message stating that since the Zoloft is not yet prescribed, I cannot call the pharmacy to authorize and her Primary Care PCP is not currently in the office either. However, this will be addressed on Tuesday once Dr. MERCADO is in office. I also offered moving up her appointment if need be.

## 2023-12-29 NOTE — TELEPHONE ENCOUNTER
Patient called in stating that Dr. Goodman put her on a depression medication and stated she was going to start her on a 2nd medication (she thinks it was called Zoloft) after a few weeks of taking the current one. Patient is wanting to know if there is any update on getting this 2nd rx prescribed?    Advise     Patient stated she is at work and a voicemail can be left on her cell phone.

## 2024-01-02 NOTE — TELEPHONE ENCOUNTER
My instructions are for her to increase the dose of her Prozac to 40 mg daily after two weeks if she is tolerating the initial dose of 20 mg daily. I will not be starting her on Zoloft on top of the Prozac. The choice was either Prozac or Zoloft, but not both. First we need to work on increasing the Prozac. We did discuss possibly adding Buspar on top of the Prozac later on, but not until we see how the Prozac goes for a month or two first.

## 2024-01-10 ENCOUNTER — OFFICE VISIT (OUTPATIENT)
Dept: ENT CLINIC | Age: 49
End: 2024-01-10
Payer: COMMERCIAL

## 2024-01-10 VITALS
BODY MASS INDEX: 19.66 KG/M2 | HEIGHT: 65 IN | SYSTOLIC BLOOD PRESSURE: 98 MMHG | WEIGHT: 118 LBS | RESPIRATION RATE: 16 BRPM | DIASTOLIC BLOOD PRESSURE: 70 MMHG | HEART RATE: 70 BPM

## 2024-01-10 DIAGNOSIS — H72.93 PERFORATION OF BOTH TYMPANIC MEMBRANES: ICD-10-CM

## 2024-01-10 DIAGNOSIS — H71.92 CHOLESTEATOMA OF LEFT EAR: Primary | ICD-10-CM

## 2024-01-10 PROCEDURE — 99213 OFFICE O/P EST LOW 20 MIN: CPT | Performed by: OTOLARYNGOLOGY

## 2024-01-10 NOTE — PROGRESS NOTES
[facility]  DIVISION OF OTOLARYNGOLOGY- HEAD & NECK SURGERY  Follow up      Patient Name: Bessie Casper  Medical Record Number:  [id number]  Primary Care Physician:  Hailey Guidry MD  Date of Consultation: 1/10/2024    Chief Complaint: Ear issues        Interval History    Patient is following up for her ears.  She has known bilateral tympanic membrane perforations.  She has had multiple attempts at repair.  She developed a cholesteatoma on the left side and she is status post left tympanoplasty for this.  She said that the left ear feels little plugged.  She does not think there is a significant change in the hearing          REVIEW OF SYSTEMS  As above    PHYSICAL EXAM  GENERAL: No Acute Distress, Alert and Oriented, no Hoarseness, strong voice  EYES: EOMI, Anti-icteric  HENT:   Head: Normocephalic and atraumatic.   Face:  Symmetric, facial nerve intact, no sinus tenderness  Ears: See below          PROCEDURE  The right ear was visualized with the binocular scope.  She has a stable small inferior perforation with a dry middle ear.  On the left side there was some wax removed from the ear canal.  Unfortunately she still has a persistent perforation.  There is no evidence of cholesteatoma        ASSESSMENT/PLAN  1. Cholesteatoma of left ear  I do not see any evidence of cholesteatoma, but unfortunately she still has the perforation.  This is not surprising given that she has had multiple failed surgeries.  I would like for her to get an audiogram to see if there is been any changes to her hearing.  Otherwise I think should follow-up yearly.    2. Perforation of both tympanic membranes  She has had multiple attempts at repair on both sides.  I do not think attempting to repair the tympanic membrane perforation is a good idea unless she develops chronic otorrhea or another cholesteatoma.             I have performed a head and neck physical exam personally or was physically present during the key or

## 2024-01-16 RX ORDER — RIMEGEPANT SULFATE 75 MG/75MG
TABLET, ORALLY DISINTEGRATING ORAL
Qty: 16 TABLET | Refills: 0 | Status: SHIPPED | OUTPATIENT
Start: 2024-01-16

## 2024-01-16 NOTE — TELEPHONE ENCOUNTER
Last Office Visit  -  9/13/23  Next Office Visit  -      Last Filled  -    Last UDS -    Contract -

## 2024-01-30 ENCOUNTER — TELEPHONE (OUTPATIENT)
Dept: ENT CLINIC | Age: 49
End: 2024-01-30

## 2024-01-30 NOTE — TELEPHONE ENCOUNTER
Patient had post op ike w dr ward on 1/10 surgery on 9/8  She states she is in a lot of pain and her antibiotic ear drops are making it worse.   She is asking if another doctor would be willing to see her in [place] today.   She states she called dr santana office and he is on vacation.   Please advise [phone]

## 2024-02-01 ENCOUNTER — OFFICE VISIT (OUTPATIENT)
Dept: ENT CLINIC | Age: 49
End: 2024-02-01
Payer: COMMERCIAL

## 2024-02-01 VITALS
HEART RATE: 65 BPM | DIASTOLIC BLOOD PRESSURE: 73 MMHG | HEIGHT: 65 IN | OXYGEN SATURATION: 98 % | BODY MASS INDEX: 21.66 KG/M2 | WEIGHT: 130 LBS | SYSTOLIC BLOOD PRESSURE: 112 MMHG | TEMPERATURE: 97.8 F | RESPIRATION RATE: 16 BRPM

## 2024-02-01 DIAGNOSIS — H71.92 CHOLESTEATOMA OF LEFT EAR: Primary | ICD-10-CM

## 2024-02-01 DIAGNOSIS — H72.93 PERFORATION OF BOTH TYMPANIC MEMBRANES: ICD-10-CM

## 2024-02-01 DIAGNOSIS — H92.02 LEFT EAR PAIN: ICD-10-CM

## 2024-02-01 PROCEDURE — 99214 OFFICE O/P EST MOD 30 MIN: CPT | Performed by: OTOLARYNGOLOGY

## 2024-02-01 RX ORDER — AMOXICILLIN AND CLAVULANATE POTASSIUM 875; 125 MG/1; MG/1
1 TABLET, FILM COATED ORAL 2 TIMES DAILY
Qty: 14 TABLET | Refills: 0 | Status: SHIPPED | OUTPATIENT
Start: 2024-02-01 | End: 2024-02-08

## 2024-02-01 NOTE — PROGRESS NOTES
[facility]  DIVISION OF OTOLARYNGOLOGY- HEAD & NECK SURGERY  Follow up      Patient Name: Bessie Casper  Medical Record Number:  [id number]  Primary Care Physician:  Hailey Guidry MD  Date of Consultation: 2/1/2024    Chief Complaint: Ear pain        Interval History    The patient says she has had pretty severe left-sided ear pain for the last few days.  When I last saw her on January 10 she was doing okay.  She had the right ear was hurting a little bit, mostly left side.  She has been trying the eardrops, but they also seem to hurt.  In addition she said that she does not had her insurance restored so she was out of her migraine medications          REVIEW OF SYSTEMS    As above  PHYSICAL EXAM  GENERAL: No Acute Distress, Alert and Oriented, no Hoarseness, strong voice  EYES: EOMI, Anti-icteric  HENT:   Head: Normocephalic and atraumatic.   Face:  Symmetric, facial nerve intact, no sinus tenderness  Ear: See below  Mouth/Oral Cavity:  normal lips, Uvula is midline, no mucosal lesions, no trismus, no TMJ pain  Oropharynx/Larynx:  normal oropharynx,  Nose:Normal external nasal appearance.   NECK: Normal range of motion, no thyromegaly, trachea is midline, no lymphadenopathy, no neck masses, no crepitus        PROCEDURE  Bilateral ear exam  Ear was visualized microscope.  She has a stable perforation with a dry middle ear.  The left side she has a perforation with slight moisture in the middle ear and ear canal.        ASSESSMENT/PLAN  1. Cholesteatoma of left ear  She is having a lot of ear pain on the left side over the last few days.  There is a little bit of moisture, but not a severe infection.  Given her history I think is reasonable give her a round of antibiotics and drops just in case.  I think the other potential etiology is related to her migraines.  She has been off all of these medications for a little while.  She is just restarting them today.  If she has persistent pain I told her to

## 2024-02-02 ENCOUNTER — TELEPHONE (OUTPATIENT)
Dept: ENT CLINIC | Age: 49
End: 2024-02-02

## 2024-02-02 NOTE — TELEPHONE ENCOUNTER
Pt calling because the new ear drops that were prescribed hurt worse than the original prescription. She is calling because she would like to know what her options are. Pt says it's okay to leave a voicemail

## 2024-02-06 ENCOUNTER — TELEPHONE (OUTPATIENT)
Dept: ENT CLINIC | Age: 49
End: 2024-02-06

## 2024-02-06 NOTE — TELEPHONE ENCOUNTER
8/25 dispensing appointment patient paid $1180  Patient received a bill for over $3,000 - she states her mail was being thrown away unknowingly so she just noticed it.   Please advise [phone] - can leave detailed VM if she doesn't answer

## 2024-02-12 ENCOUNTER — TELEPHONE (OUTPATIENT)
Dept: ENT CLINIC | Age: 49
End: 2024-02-12

## 2024-02-12 NOTE — TELEPHONE ENCOUNTER
Pt called back, relayed Dr Luther's message that he would want to see her. Offered to work her in on Wed AM, offered open appt 11:45 on Thurs; pt declined both appts, stating she is only off tomorrow (tues.).

## 2024-02-12 NOTE — TELEPHONE ENCOUNTER
I do not really know what else to do for the ear pain.  It could be related to her migraines.  I would need to examine her again.

## 2024-02-12 NOTE — TELEPHONE ENCOUNTER
Pt calling because she was given an antibiotic and eardops on 2/1. She states she is still in excruciating pain. Her ears are itching, bleeding and draining. Please advise

## 2024-02-21 ENCOUNTER — TELEPHONE (OUTPATIENT)
Dept: FAMILY MEDICINE CLINIC | Age: 49
End: 2024-02-21

## 2024-02-21 DIAGNOSIS — M54.2 NECK PAIN: ICD-10-CM

## 2024-02-21 RX ORDER — CYCLOBENZAPRINE HCL 10 MG
TABLET ORAL
Qty: 30 TABLET | Refills: 1 | Status: SHIPPED | OUTPATIENT
Start: 2024-02-21

## 2024-02-21 NOTE — TELEPHONE ENCOUNTER
Pt called office stating she pulled a muscle in her back, near her ribs. She states she is in a lot of pain and would like a rx flexeril. Pt states that Dr. Guidry has seen her in the past for back issues. Pt was informed that she will likely need an appt to get a new rx but she refused. Please advise and call pt back

## 2024-02-23 ENCOUNTER — TELEMEDICINE (OUTPATIENT)
Dept: FAMILY MEDICINE CLINIC | Age: 49
End: 2024-02-23

## 2024-02-23 DIAGNOSIS — R05.1 ACUTE COUGH: ICD-10-CM

## 2024-02-23 DIAGNOSIS — G43.111 INTRACTABLE MIGRAINE WITH AURA WITH STATUS MIGRAINOSUS: Primary | ICD-10-CM

## 2024-02-23 DIAGNOSIS — F50.2 BULIMIA NERVOSA, PURGING TYPE: ICD-10-CM

## 2024-02-23 DIAGNOSIS — F33.2 MDD (MAJOR DEPRESSIVE DISORDER), RECURRENT SEVERE, WITHOUT PSYCHOSIS (HCC): ICD-10-CM

## 2024-02-23 LAB
INFLUENZA A ANTIGEN, POC: NEGATIVE
INFLUENZA B ANTIGEN, POC: NEGATIVE
LOT EXPIRE DATE: NORMAL
LOT KIT NUMBER: NORMAL
RSV RAPID ANTIGEN: NORMAL
SARS-COV-2, POC: NORMAL
VALID INTERNAL CONTROL: NORMAL
VENDOR AND KIT NAME POC: NORMAL

## 2024-02-23 ASSESSMENT — ENCOUNTER SYMPTOMS
EYES NEGATIVE: 1
GASTROINTESTINAL NEGATIVE: 1
SHORTNESS OF BREATH: 0

## 2024-02-23 NOTE — PROGRESS NOTES
discoloration noted on facial skin         [] Abnormal -            Psychiatric:       [x] Normal Affect [] Abnormal -        [x] No Hallucinations    Other pertinent observable physical exam findings:- none       On this date 2/23/2024 I have spent 30 minutes reviewing previous notes, test results and face to face (virtual) with the patient discussing the diagnosis and importance of compliance with the treatment plan as well as documenting on the day of the visit.    Bessie Casper, was evaluated through a synchronous (real-time) audio-video encounter. The patient (or guardian if applicable) is aware that this is a billable service, which includes applicable co-pays. This Virtual Visit was conducted with patient's (and/or legal guardian's) consent. Patient identification was verified, and a caregiver was present when appropriate.   The patient was located at Home: [address] [place], [place] [address]carlos [place].   [id number]  [facility] [address]  Provider was located at Facility (Appt Dept): [address] [place] [address]       --LOR Wilde - CNP

## 2024-02-27 ENCOUNTER — TELEPHONE (OUTPATIENT)
Dept: FAMILY MEDICINE CLINIC | Age: 49
End: 2024-02-27

## 2024-02-27 NOTE — TELEPHONE ENCOUNTER
Received alternate request for Aimovig. Insurance requires Emgality or Ajovy     VV 2/23/24  Next office visit   Visit date not found

## 2024-02-28 RX ORDER — GALCANEZUMAB 120 MG/ML
120 INJECTION, SOLUTION SUBCUTANEOUS
Qty: 1 ADJUSTABLE DOSE PRE-FILLED PEN SYRINGE | Refills: 5 | Status: SHIPPED | OUTPATIENT
Start: 2024-02-28

## 2024-02-29 ENCOUNTER — TELEPHONE (OUTPATIENT)
Dept: ADMINISTRATIVE | Age: 49
End: 2024-02-29

## 2024-02-29 NOTE — TELEPHONE ENCOUNTER
Submitted PA for Emgality 120MG/ML auto-injectors (migraine)      Via CMM Key: [id number]  STATUS: PENDING.    Follow up done daily; if no decision with in three days we will refax.  If another three days goes by with no decision will call the insurance for status.

## 2024-04-10 ENCOUNTER — TELEMEDICINE (OUTPATIENT)
Dept: FAMILY MEDICINE CLINIC | Age: 49
End: 2024-04-10
Payer: COMMERCIAL

## 2024-04-10 DIAGNOSIS — L08.9 INFECTED FINGER: Primary | ICD-10-CM

## 2024-04-10 PROCEDURE — 99213 OFFICE O/P EST LOW 20 MIN: CPT | Performed by: NURSE PRACTITIONER

## 2024-04-10 RX ORDER — CEPHALEXIN 500 MG/1
500 CAPSULE ORAL 2 TIMES DAILY
Qty: 14 CAPSULE | Refills: 0 | Status: SHIPPED | OUTPATIENT
Start: 2024-04-10 | End: 2024-04-17

## 2024-04-10 ASSESSMENT — PATIENT HEALTH QUESTIONNAIRE - PHQ9
7. TROUBLE CONCENTRATING ON THINGS, SUCH AS READING THE NEWSPAPER OR WATCHING TELEVISION: NOT AT ALL
SUM OF ALL RESPONSES TO PHQ QUESTIONS 1-9: 12
3. TROUBLE FALLING OR STAYING ASLEEP: NEARLY EVERY DAY
SUM OF ALL RESPONSES TO PHQ9 QUESTIONS 1 & 2: 4
SUM OF ALL RESPONSES TO PHQ QUESTIONS 1-9: 12
SUM OF ALL RESPONSES TO PHQ QUESTIONS 1-9: 12
2. FEELING DOWN, DEPRESSED OR HOPELESS: SEVERAL DAYS
5. POOR APPETITE OR OVEREATING: NOT AT ALL
6. FEELING BAD ABOUT YOURSELF - OR THAT YOU ARE A FAILURE OR HAVE LET YOURSELF OR YOUR FAMILY DOWN: SEVERAL DAYS
SUM OF ALL RESPONSES TO PHQ QUESTIONS 1-9: 12
9. THOUGHTS THAT YOU WOULD BE BETTER OFF DEAD, OR OF HURTING YOURSELF: NOT AT ALL
8. MOVING OR SPEAKING SO SLOWLY THAT OTHER PEOPLE COULD HAVE NOTICED. OR THE OPPOSITE, BEING SO FIGETY OR RESTLESS THAT YOU HAVE BEEN MOVING AROUND A LOT MORE THAN USUAL: SEVERAL DAYS
10. IF YOU CHECKED OFF ANY PROBLEMS, HOW DIFFICULT HAVE THESE PROBLEMS MADE IT FOR YOU TO DO YOUR WORK, TAKE CARE OF THINGS AT HOME, OR GET ALONG WITH OTHER PEOPLE: NOT DIFFICULT AT ALL
4. FEELING TIRED OR HAVING LITTLE ENERGY: NEARLY EVERY DAY
1. LITTLE INTEREST OR PLEASURE IN DOING THINGS: NEARLY EVERY DAY

## 2024-04-10 ASSESSMENT — ENCOUNTER SYMPTOMS
RESPIRATORY NEGATIVE: 1
GASTROINTESTINAL NEGATIVE: 1

## 2024-04-10 NOTE — PROGRESS NOTES
effort normal   [x] No visualized signs of difficulty breathing or respiratory distress        [] Abnormal -      Musculoskeletal:   [x] Normal gait with no signs of ataxia         [x] Normal range of motion of neck        [] Abnormal -     Neurological:        [x] No Facial Asymmetry (Cranial nerve 7 motor function) (limited exam due to video visit)          [x] No gaze palsy        [] Abnormal -          Skin:        [x] No significant exanthematous lesions or discoloration noted on facial skin         [] Abnormal -            Psychiatric:       [x] Normal Affect [] Abnormal -        [x] No Hallucinations    Other pertinent observable physical exam findings:-             Bessie Casper, was evaluated through a synchronous (real-time) audio-video encounter. The patient (or guardian if applicable) is aware that this is a billable service, which includes applicable co-pays. This Virtual Visit was conducted with patient's (and/or legal guardian's) consent. Patient identification was verified, and a caregiver was present when appropriate.   The patient was located at Home: [address] [place] [address].   [id number]  [place] [id number]  Provider was located at Facility (Appt Dept): [address] [place] [address]  Confirm you are appropriately licensed, registered, or certified to deliver care in the state where the patient is located as indicated above. If you are not or unsure, please re-schedule the visit: Yes, I confirm.        --LOR Pollack - CNP

## 2024-04-29 RX ORDER — FLUOXETINE HYDROCHLORIDE 20 MG/1
CAPSULE ORAL
Qty: 180 CAPSULE | Refills: 1 | Status: SHIPPED | OUTPATIENT
Start: 2024-04-29 | End: 2024-05-02 | Stop reason: SINTOL

## 2024-05-02 ENCOUNTER — TELEMEDICINE (OUTPATIENT)
Dept: PSYCHIATRY | Age: 49
End: 2024-05-02

## 2024-05-02 DIAGNOSIS — F33.2 MDD (MAJOR DEPRESSIVE DISORDER), RECURRENT SEVERE, WITHOUT PSYCHOSIS (HCC): Primary | ICD-10-CM

## 2024-05-02 DIAGNOSIS — F50.2 BULIMIA NERVOSA: ICD-10-CM

## 2024-05-02 PROCEDURE — 90833 PSYTX W PT W E/M 30 MIN: CPT | Performed by: STUDENT IN AN ORGANIZED HEALTH CARE EDUCATION/TRAINING PROGRAM

## 2024-05-02 PROCEDURE — 99214 OFFICE O/P EST MOD 30 MIN: CPT | Performed by: STUDENT IN AN ORGANIZED HEALTH CARE EDUCATION/TRAINING PROGRAM

## 2024-05-02 RX ORDER — VENLAFAXINE HYDROCHLORIDE 37.5 MG/1
CAPSULE, EXTENDED RELEASE ORAL
Qty: 60 CAPSULE | Refills: 2 | Status: SHIPPED | OUTPATIENT
Start: 2024-05-02

## 2024-05-02 ASSESSMENT — PATIENT HEALTH QUESTIONNAIRE - PHQ9
7. TROUBLE CONCENTRATING ON THINGS, SUCH AS READING THE NEWSPAPER OR WATCHING TELEVISION: NEARLY EVERY DAY
1. LITTLE INTEREST OR PLEASURE IN DOING THINGS: NEARLY EVERY DAY
SUM OF ALL RESPONSES TO PHQ QUESTIONS 1-9: 20
SUM OF ALL RESPONSES TO PHQ QUESTIONS 1-9: 20
2. FEELING DOWN, DEPRESSED OR HOPELESS: NEARLY EVERY DAY
8. MOVING OR SPEAKING SO SLOWLY THAT OTHER PEOPLE COULD HAVE NOTICED. OR THE OPPOSITE, BEING SO FIGETY OR RESTLESS THAT YOU HAVE BEEN MOVING AROUND A LOT MORE THAN USUAL: NOT AT ALL
SUM OF ALL RESPONSES TO PHQ QUESTIONS 1-9: 19
SUM OF ALL RESPONSES TO PHQ QUESTIONS 1-9: 20
5. POOR APPETITE OR OVEREATING: SEVERAL DAYS
9. THOUGHTS THAT YOU WOULD BE BETTER OFF DEAD, OR OF HURTING YOURSELF: SEVERAL DAYS
4. FEELING TIRED OR HAVING LITTLE ENERGY: NEARLY EVERY DAY
3. TROUBLE FALLING OR STAYING ASLEEP: NEARLY EVERY DAY
SUM OF ALL RESPONSES TO PHQ9 QUESTIONS 1 & 2: 6
6. FEELING BAD ABOUT YOURSELF - OR THAT YOU ARE A FAILURE OR HAVE LET YOURSELF OR YOUR FAMILY DOWN: NEARLY EVERY DAY

## 2024-05-02 ASSESSMENT — ANXIETY QUESTIONNAIRES
6. BECOMING EASILY ANNOYED OR IRRITABLE: NEARLY EVERY DAY
5. BEING SO RESTLESS THAT IT IS HARD TO SIT STILL: NEARLY EVERY DAY
2. NOT BEING ABLE TO STOP OR CONTROL WORRYING: NEARLY EVERY DAY
7. FEELING AFRAID AS IF SOMETHING AWFUL MIGHT HAPPEN: NOT AT ALL
3. WORRYING TOO MUCH ABOUT DIFFERENT THINGS: NEARLY EVERY DAY
4. TROUBLE RELAXING: NEARLY EVERY DAY
1. FEELING NERVOUS, ANXIOUS, OR ON EDGE: MORE THAN HALF THE DAYS
GAD7 TOTAL SCORE: 17

## 2024-05-02 NOTE — TELEPHONE ENCOUNTER
I was able to get a hold of her and she was tearful, said that she wanted to maybe admit herself. I added her on this afternoon at 2:30.

## 2024-05-02 NOTE — PROGRESS NOTES
Patient is following up today via virtual and would like to address constant crying, stopped taking Prozac:    PHQ:20  NICHOL:17    Previous Scores from OV on  12.12.2023  PHQ:21  NICHOL:18

## 2024-05-02 NOTE — PROGRESS NOTES
Outpatient Psychiatry Integrated Care   [facility]     Patient name: Bessie Casper  YOB: 1975  MRN: [id number]  Day of Service: 5/2/2024      Referring Primary Care Clinician: Hailey Guidry MD    The patient was evaluated through a synchronous (real-time) audio-video encounter. The patient (or guardian if applicable) is aware that this is a billable service, which includes applicable co-pays. This Virtual Visit was conducted with patient's (and/or legal guardian's) consent. Patient was deemed to be appropriate for Virtual Visit. Patient identification was verified, and a caregiver was present when appropriate. This provider is licensed in the [facility].    The patient was located at: her car at work in [facility]       Reason for Visit:     Chief Complaint   Patient presents with    Follow-up       CC: depression    HPI:     Bessie Casper is a 49 y.o. White (non-) female with a history of MDD, bulimia nervosa, fibromyalgia who presents to outpatient primary care psychiatry on 5/2/2024 for psychiatric medication management.      Today, the patient reports she completed her divorce in early December. Shortly after, her son wanted to go back to living with his dad. This is the main cause of her current emotional pain. Her son won't talk to her or answer her calls/texts. Feels shut out and ignored. She feels crushed. Feels like she can't get over it.    Says she would rather go to sleep and not wake up; does not want to kill herself and has no plans or intent for suicide or self-harm.     She stopped the Prozac due to uncontrollable crying. Seemed to make her more tearful sometimes. It has worked for the bulimia in the past but does not seem to help for mood. Her bulimia is under control right now, even without the Prozac. Is eating more healthily right now.    Is still working at [employer]. She is crying every day, feels so irritable.

## 2024-05-16 ENCOUNTER — TELEMEDICINE (OUTPATIENT)
Dept: PSYCHIATRY | Age: 49
End: 2024-05-16

## 2024-05-16 DIAGNOSIS — F33.2 MDD (MAJOR DEPRESSIVE DISORDER), RECURRENT SEVERE, WITHOUT PSYCHOSIS (HCC): Primary | ICD-10-CM

## 2024-05-16 DIAGNOSIS — F50.2 BULIMIA NERVOSA: ICD-10-CM

## 2024-05-16 ASSESSMENT — PATIENT HEALTH QUESTIONNAIRE - PHQ9
SUM OF ALL RESPONSES TO PHQ QUESTIONS 1-9: 15
5. POOR APPETITE OR OVEREATING: MORE THAN HALF THE DAYS
6. FEELING BAD ABOUT YOURSELF - OR THAT YOU ARE A FAILURE OR HAVE LET YOURSELF OR YOUR FAMILY DOWN: MORE THAN HALF THE DAYS
3. TROUBLE FALLING OR STAYING ASLEEP: MORE THAN HALF THE DAYS
SUM OF ALL RESPONSES TO PHQ9 QUESTIONS 1 & 2: 4
4. FEELING TIRED OR HAVING LITTLE ENERGY: SEVERAL DAYS
SUM OF ALL RESPONSES TO PHQ QUESTIONS 1-9: 15
1. LITTLE INTEREST OR PLEASURE IN DOING THINGS: MORE THAN HALF THE DAYS
SUM OF ALL RESPONSES TO PHQ QUESTIONS 1-9: 15
7. TROUBLE CONCENTRATING ON THINGS, SUCH AS READING THE NEWSPAPER OR WATCHING TELEVISION: MORE THAN HALF THE DAYS
SUM OF ALL RESPONSES TO PHQ QUESTIONS 1-9: 15
2. FEELING DOWN, DEPRESSED OR HOPELESS: MORE THAN HALF THE DAYS
9. THOUGHTS THAT YOU WOULD BE BETTER OFF DEAD, OR OF HURTING YOURSELF: NOT AT ALL
8. MOVING OR SPEAKING SO SLOWLY THAT OTHER PEOPLE COULD HAVE NOTICED. OR THE OPPOSITE, BEING SO FIGETY OR RESTLESS THAT YOU HAVE BEEN MOVING AROUND A LOT MORE THAN USUAL: MORE THAN HALF THE DAYS

## 2024-05-16 ASSESSMENT — ANXIETY QUESTIONNAIRES
2. NOT BEING ABLE TO STOP OR CONTROL WORRYING: MORE THAN HALF THE DAYS
4. TROUBLE RELAXING: MORE THAN HALF THE DAYS
6. BECOMING EASILY ANNOYED OR IRRITABLE: MORE THAN HALF THE DAYS
3. WORRYING TOO MUCH ABOUT DIFFERENT THINGS: MORE THAN HALF THE DAYS
GAD7 TOTAL SCORE: 14
1. FEELING NERVOUS, ANXIOUS, OR ON EDGE: MORE THAN HALF THE DAYS
5. BEING SO RESTLESS THAT IT IS HARD TO SIT STILL: MORE THAN HALF THE DAYS
7. FEELING AFRAID AS IF SOMETHING AWFUL MIGHT HAPPEN: MORE THAN HALF THE DAYS

## 2024-05-16 NOTE — PROGRESS NOTES
Patient is following up today via virtual and would like to address depression:    PHQ:15  NICHOL:14    Previous Scores from OV on 5.2.2024  PHQ:20  NICHOL:17              
More than half the days Nearly every day   Becoming easily annoyed or irritable More than half the days Nearly every day   Feeling afraid as if something awful might happen More than half the days Not at all   NICHOL-7 Total Score 14 17        AIMS Scale    N/A      Assessment and Plan:     Bessie Casper is a 49 y.o. White (non-) female with a history of depression, bulimia nervosa, and fobromyalgia who presents to outpatient primary care psychiatry on 5/16/2024 for psychiatric medication management.    Assessment:   The patient has recurrent MDD and bulimia nervosa. Has a hx of childhood trauma, which is likely a significant source of her chronic depression and eating disordered behavior. Does not quite meet full criteria for PTSD, but she also has a difficult time talking about her trauma and related symptoms, so it is difficult to assess whether or not she meets certain criteria for PTSD. She also just went through a divorce in late 2023/early 2024, and is experiencing worsening of her depression in the context of fallout from this process. She oscillates between bulimia nervosa and anorexia nervosa - binging-purging subtype, based on whether or not her BMI is underweight. Her BMI is currently within normal range, so she does not qualify as anorexic; but her weight is at the very low end of normal. Wellbutrin is contraindicated in bulimia nervosa (and anorexia) d/t increased risk for seizures, so will not re-prescribe this medication in the future. SSRIs are indicated for treatment of bulimia nervosa and associated mood/anxiety symptoms. Prozac has historically worked well for her bulimia, but not her depression (has possibly made her depression worse). After discussing different antidepressants, she would like to try Effexor again (never completed a full trial of an adequate dose). If depression symptoms are not fully controlled after max dose of an SSRI or SNRI, we can add on an adjunctive agent such

## 2024-05-22 ENCOUNTER — PATIENT MESSAGE (OUTPATIENT)
Dept: PSYCHIATRY | Age: 49
End: 2024-05-22

## 2024-05-23 RX ORDER — VENLAFAXINE HYDROCHLORIDE 150 MG/1
150 CAPSULE, EXTENDED RELEASE ORAL DAILY
Qty: 30 CAPSULE | Refills: 5 | Status: SHIPPED | OUTPATIENT
Start: 2024-05-23

## 2024-05-23 NOTE — TELEPHONE ENCOUNTER
Plan: Increase Effexor XR to 150 mg daily for depression.        From: Bessie Casper  To: Dr. Vanessa Goodman  Sent: 5/22/2024 12:05 PM EDT  Subject: Meds    Can we up my dose or add another med. I don't want to do the IOP but need to feel better I am still super sad

## 2024-05-30 ENCOUNTER — TELEPHONE (OUTPATIENT)
Dept: ADMINISTRATIVE | Age: 49
End: 2024-05-30

## 2024-05-30 NOTE — TELEPHONE ENCOUNTER
Submitted PA for Galcanezumab-gnlm (EMGALITY) 120 MG/ML SOAJ   Via CMM (Key: [id number]) STATUS: PENDING.    Follow up done daily; if no decision with in three days we will refax.  If another three days goes by with no decision will call the insurance for status.

## 2024-05-31 NOTE — TELEPHONE ENCOUNTER
Your PA has been resolved, no additional PA is required.      If this requires a response please respond to the pool ( P MHCX P[facility] MEDICATION PRE-AUTH).      Thank you please advise patient.

## 2024-06-03 ENCOUNTER — OFFICE VISIT (OUTPATIENT)
Dept: PSYCHIATRY | Age: 49
End: 2024-06-03
Payer: COMMERCIAL

## 2024-06-03 VITALS
BODY MASS INDEX: 21.66 KG/M2 | WEIGHT: 130 LBS | DIASTOLIC BLOOD PRESSURE: 70 MMHG | HEART RATE: 74 BPM | SYSTOLIC BLOOD PRESSURE: 106 MMHG | HEIGHT: 65 IN

## 2024-06-03 DIAGNOSIS — F50.2 BULIMIA NERVOSA: ICD-10-CM

## 2024-06-03 DIAGNOSIS — F33.2 MDD (MAJOR DEPRESSIVE DISORDER), RECURRENT SEVERE, WITHOUT PSYCHOSIS (HCC): Primary | ICD-10-CM

## 2024-06-03 PROCEDURE — 90833 PSYTX W PT W E/M 30 MIN: CPT | Performed by: STUDENT IN AN ORGANIZED HEALTH CARE EDUCATION/TRAINING PROGRAM

## 2024-06-03 PROCEDURE — 99214 OFFICE O/P EST MOD 30 MIN: CPT | Performed by: STUDENT IN AN ORGANIZED HEALTH CARE EDUCATION/TRAINING PROGRAM

## 2024-06-03 RX ORDER — VENLAFAXINE HYDROCHLORIDE 150 MG/1
CAPSULE, EXTENDED RELEASE ORAL
Qty: 30 CAPSULE | Refills: 5 | Status: SHIPPED | OUTPATIENT
Start: 2024-06-03

## 2024-06-03 RX ORDER — VENLAFAXINE HYDROCHLORIDE 75 MG/1
CAPSULE, EXTENDED RELEASE ORAL
Qty: 30 CAPSULE | Refills: 5 | Status: SHIPPED | OUTPATIENT
Start: 2024-06-03

## 2024-06-03 ASSESSMENT — PATIENT HEALTH QUESTIONNAIRE - PHQ9
8. MOVING OR SPEAKING SO SLOWLY THAT OTHER PEOPLE COULD HAVE NOTICED. OR THE OPPOSITE, BEING SO FIGETY OR RESTLESS THAT YOU HAVE BEEN MOVING AROUND A LOT MORE THAN USUAL: NOT AT ALL
7. TROUBLE CONCENTRATING ON THINGS, SUCH AS READING THE NEWSPAPER OR WATCHING TELEVISION: NEARLY EVERY DAY
SUM OF ALL RESPONSES TO PHQ QUESTIONS 1-9: 22
6. FEELING BAD ABOUT YOURSELF - OR THAT YOU ARE A FAILURE OR HAVE LET YOURSELF OR YOUR FAMILY DOWN: NEARLY EVERY DAY
2. FEELING DOWN, DEPRESSED OR HOPELESS: NEARLY EVERY DAY
SUM OF ALL RESPONSES TO PHQ QUESTIONS 1-9: 21
SUM OF ALL RESPONSES TO PHQ9 QUESTIONS 1 & 2: 6
5. POOR APPETITE OR OVEREATING: NEARLY EVERY DAY
SUM OF ALL RESPONSES TO PHQ QUESTIONS 1-9: 22
SUM OF ALL RESPONSES TO PHQ QUESTIONS 1-9: 22
9. THOUGHTS THAT YOU WOULD BE BETTER OFF DEAD, OR OF HURTING YOURSELF: SEVERAL DAYS
3. TROUBLE FALLING OR STAYING ASLEEP: NEARLY EVERY DAY
4. FEELING TIRED OR HAVING LITTLE ENERGY: NEARLY EVERY DAY
1. LITTLE INTEREST OR PLEASURE IN DOING THINGS: NEARLY EVERY DAY

## 2024-06-03 ASSESSMENT — ANXIETY QUESTIONNAIRES
6. BECOMING EASILY ANNOYED OR IRRITABLE: NEARLY EVERY DAY
2. NOT BEING ABLE TO STOP OR CONTROL WORRYING: NEARLY EVERY DAY
GAD7 TOTAL SCORE: 19
5. BEING SO RESTLESS THAT IT IS HARD TO SIT STILL: NEARLY EVERY DAY
1. FEELING NERVOUS, ANXIOUS, OR ON EDGE: NEARLY EVERY DAY
4. TROUBLE RELAXING: NEARLY EVERY DAY
7. FEELING AFRAID AS IF SOMETHING AWFUL MIGHT HAPPEN: SEVERAL DAYS
3. WORRYING TOO MUCH ABOUT DIFFERENT THINGS: NEARLY EVERY DAY

## 2024-06-03 NOTE — PROGRESS NOTES
Patient is following up today via in person, very tearful today:    PHQ:22  NICHOL:19    Previous Scores from OV on 5.16.2024  PHQ:15  NICHOL:14            
dilated perivascular space. There is no other  area of abnormal brain attenuation. The ventricles and sulci are  normal in size and configuration.    Changes from right mastoidectomy are again noted. The visualized  paranasal sinuses as well as the orbits are clear.    IMPRESSION- No acute intracranial abnormality.    --------------------------     MRI Brain: No results found for this or any previous visit.          Psychiatric Rating Scales:    PHQ-9 and NICHOL-7      6/3/2024    12:10 PM 5/16/2024    11:47 AM   PHQ-9 Questionaire   Little interest or pleasure in doing things 3 2   Feeling down, depressed, or hopeless 3 2   Trouble falling or staying asleep, or sleeping too much 3 2   Feeling tired or having little energy 3 1   Poor appetite or overeating 3 2   Feeling bad about yourself - or that you are a failure or have let yourself or your family down 3 2   Trouble concentrating on things, such as reading the newspaper or watching television 3 2   Moving or speaking so slowly that other people could have noticed. Or the opposite - being so fidgety or restless that you have been moving around a lot more than usual 0 2   Thoughts that you would be better off dead, or of hurting yourself in some way 1 0   PHQ-9 Total Score 22 15         6/3/2024    12:00 PM 5/16/2024    11:00 AM   NICHOL-7 SCREENING   Feeling nervous, anxious, or on edge Nearly every day More than half the days   Not being able to stop or control worrying Nearly every day More than half the days   Worrying too much about different things Nearly every day More than half the days   Trouble relaxing Nearly every day More than half the days   Being so restless that it is hard to sit still Nearly every day More than half the days   Becoming easily annoyed or irritable Nearly every day More than half the days   Feeling afraid as if something awful might happen Several days More than half the days   NICHOL-7 Total Score 19 14        AIMS Scale

## 2024-06-18 ENCOUNTER — TELEPHONE (OUTPATIENT)
Dept: PSYCHIATRY | Age: 49
End: 2024-06-18

## 2024-06-18 NOTE — TELEPHONE ENCOUNTER
Suzanne called and left a VM message stating that she has no more motivation to exercise. Her only outlet was exercising and she is feeling no urge to do that now. She would like options please. She was due to follow back in 2 weeks after last appointment date therefore, I scheduled a virtual visit for her on Thursday to further discuss with you.

## 2024-06-18 NOTE — TELEPHONE ENCOUNTER
Thank you, yes we will discuss med options at her upcoming followup appt. As a reminder to patient, she will need more than medications alone to help her get back on her feet, and I will again recommend psychotherapy to help with her mood and motivation. I gave her the phone number for [facility] at her last visit. We are happy to provide this phone number to her again if she needs it.

## 2024-06-21 NOTE — TELEPHONE ENCOUNTER
I submitted an inquiry to the client opportunity tracker to find out why they only applied 890.31 to the claim.  Also insurance only paid 1892.00 towards the hearing aids.  When we checked her hearing aid benefits, they showed a benefit of $4720.00 so we need to find out why they didn't pay the balance in full.   20

## 2024-06-24 ENCOUNTER — TELEMEDICINE (OUTPATIENT)
Dept: PSYCHIATRY | Age: 49
End: 2024-06-24
Payer: COMMERCIAL

## 2024-06-24 DIAGNOSIS — F50.2 BULIMIA NERVOSA: ICD-10-CM

## 2024-06-24 DIAGNOSIS — F33.2 MDD (MAJOR DEPRESSIVE DISORDER), RECURRENT SEVERE, WITHOUT PSYCHOSIS (HCC): Primary | ICD-10-CM

## 2024-06-24 PROCEDURE — 99214 OFFICE O/P EST MOD 30 MIN: CPT | Performed by: STUDENT IN AN ORGANIZED HEALTH CARE EDUCATION/TRAINING PROGRAM

## 2024-06-24 PROCEDURE — 90833 PSYTX W PT W E/M 30 MIN: CPT | Performed by: STUDENT IN AN ORGANIZED HEALTH CARE EDUCATION/TRAINING PROGRAM

## 2024-06-24 RX ORDER — VENLAFAXINE HYDROCHLORIDE 150 MG/1
CAPSULE, EXTENDED RELEASE ORAL
Qty: 90 CAPSULE | Refills: 3 | Status: SHIPPED | OUTPATIENT
Start: 2024-06-24

## 2024-06-24 RX ORDER — VENLAFAXINE HYDROCHLORIDE 75 MG/1
CAPSULE, EXTENDED RELEASE ORAL
Qty: 90 CAPSULE | Refills: 3 | Status: SHIPPED | OUTPATIENT
Start: 2024-06-24

## 2024-06-24 RX ORDER — ARIPIPRAZOLE 2 MG/1
2 TABLET ORAL DAILY
Qty: 30 TABLET | Refills: 3 | Status: SHIPPED | OUTPATIENT
Start: 2024-06-24

## 2024-06-24 ASSESSMENT — ANXIETY QUESTIONNAIRES
7. FEELING AFRAID AS IF SOMETHING AWFUL MIGHT HAPPEN: MORE THAN HALF THE DAYS
3. WORRYING TOO MUCH ABOUT DIFFERENT THINGS: NEARLY EVERY DAY
5. BEING SO RESTLESS THAT IT IS HARD TO SIT STILL: MORE THAN HALF THE DAYS
6. BECOMING EASILY ANNOYED OR IRRITABLE: MORE THAN HALF THE DAYS
GAD7 TOTAL SCORE: 18
4. TROUBLE RELAXING: NEARLY EVERY DAY
1. FEELING NERVOUS, ANXIOUS, OR ON EDGE: NEARLY EVERY DAY
2. NOT BEING ABLE TO STOP OR CONTROL WORRYING: NEARLY EVERY DAY

## 2024-06-24 ASSESSMENT — PATIENT HEALTH QUESTIONNAIRE - PHQ9
5. POOR APPETITE OR OVEREATING: NEARLY EVERY DAY
7. TROUBLE CONCENTRATING ON THINGS, SUCH AS READING THE NEWSPAPER OR WATCHING TELEVISION: NEARLY EVERY DAY
1. LITTLE INTEREST OR PLEASURE IN DOING THINGS: NEARLY EVERY DAY
8. MOVING OR SPEAKING SO SLOWLY THAT OTHER PEOPLE COULD HAVE NOTICED. OR THE OPPOSITE, BEING SO FIGETY OR RESTLESS THAT YOU HAVE BEEN MOVING AROUND A LOT MORE THAN USUAL: NOT AT ALL
6. FEELING BAD ABOUT YOURSELF - OR THAT YOU ARE A FAILURE OR HAVE LET YOURSELF OR YOUR FAMILY DOWN: NEARLY EVERY DAY
3. TROUBLE FALLING OR STAYING ASLEEP: NEARLY EVERY DAY
9. THOUGHTS THAT YOU WOULD BE BETTER OFF DEAD, OR OF HURTING YOURSELF: NOT AT ALL
SUM OF ALL RESPONSES TO PHQ QUESTIONS 1-9: 21
SUM OF ALL RESPONSES TO PHQ9 QUESTIONS 1 & 2: 6
4. FEELING TIRED OR HAVING LITTLE ENERGY: NEARLY EVERY DAY
SUM OF ALL RESPONSES TO PHQ QUESTIONS 1-9: 21

## 2024-06-24 NOTE — PROGRESS NOTES
Outpatient Psychiatry Integrated Care   [facility]     Patient name: Bessie Casper  YOB: 1975  MRN: [id number]  Day of Service: 6/24/2024      Referring Primary Care Clinician: Hailey Guidry MD    The patient was evaluated through a synchronous (real-time) audio-video encounter. The patient (or guardian if applicable) is aware that this is a billable service, which includes applicable co-pays. This Virtual Visit was conducted with patient's (and/or legal guardian's) consent. Patient was deemed to be appropriate for Virtual Visit. Patient identification was verified, and a caregiver was present when appropriate. This provider is licensed in the [facility].    The patient was located at: home in the [facility]     Reason for Visit:     Chief Complaint   Patient presents with    Follow-up       CC: depression    HPI:     Bessie Casper is a 49 y.o. White (non-) female with a history of MDD, bulimia nervosa, fibromyalgia who presents to outpatient primary care psychiatry on 6/24/2024 for psychiatric medication management.      Today, the patient reports she is crying less with the Effexor, which is good. Is noticing low motivation which has gotten worse over time, maybe more noticeable now that she isn't crying all the time. Having a hard time getting up to exercise, no energy. This feels like part of her depression and not a side effect of the medication.     She is tolerating the increased dose of Effexor okay. Is open to an augmenting medication. We discuss Abilify and Rexulti and she is open to either one.     Is still thinking about her son all the time, how he won't talk to her or see her. This makes her incredibly sad.    Is planning to call [facility] today and is ready to see someone on weekly basis for psychotherapy. Is not on board with IOP at this time.    Still struggling with eating disordered behaviors, binge-eating and compensating

## 2024-06-24 NOTE — PROGRESS NOTES
Patient is following up today via virtual and would like to address still feeling tearful and is concerned about how much caffeine and soda she is drinking:    PHQ:21  NICHOL:18    Previous Scores from OV on 6.3.2024  PHQ:22  NICHOL:19

## 2024-07-03 ENCOUNTER — OFFICE VISIT (OUTPATIENT)
Dept: ENT CLINIC | Age: 49
End: 2024-07-03
Payer: COMMERCIAL

## 2024-07-03 VITALS
HEART RATE: 80 BPM | HEIGHT: 65 IN | BODY MASS INDEX: 19.99 KG/M2 | WEIGHT: 120 LBS | DIASTOLIC BLOOD PRESSURE: 75 MMHG | SYSTOLIC BLOOD PRESSURE: 110 MMHG

## 2024-07-03 DIAGNOSIS — H61.22 IMPACTED CERUMEN OF LEFT EAR: Primary | ICD-10-CM

## 2024-07-03 DIAGNOSIS — H71.92 CHOLESTEATOMA OF LEFT EAR: ICD-10-CM

## 2024-07-03 DIAGNOSIS — H72.02 CENTRAL PERFORATION OF TYMPANIC MEMBRANE OF LEFT EAR: ICD-10-CM

## 2024-07-03 PROCEDURE — 69210 REMOVE IMPACTED EAR WAX UNI: CPT | Performed by: STUDENT IN AN ORGANIZED HEALTH CARE EDUCATION/TRAINING PROGRAM

## 2024-07-03 PROCEDURE — 99214 OFFICE O/P EST MOD 30 MIN: CPT | Performed by: STUDENT IN AN ORGANIZED HEALTH CARE EDUCATION/TRAINING PROGRAM

## 2024-07-03 RX ORDER — CIPROFLOXACIN AND DEXAMETHASONE 3; 1 MG/ML; MG/ML
4 SUSPENSION/ DROPS AURICULAR (OTIC) 2 TIMES DAILY
Qty: 1 EACH | Refills: 0 | Status: SHIPPED | OUTPATIENT
Start: 2024-07-03 | End: 2024-07-10

## 2024-07-03 NOTE — PROGRESS NOTES
[facility]  DIVISION OF OTOLARYNGOLOGY- HEAD & NECK SURGERY  Follow up      Patient Name: Bessie Casper  Medical Record Number:  [id number]  Primary Care Physician:  Hailey Guidry MD  Date of Consultation: 7/3/2024    Chief Complaint: Ear pain    Interval History    The patient says she has had pretty severe left-sided ear pain for the last few days.  When I last saw her on January 10 she was doing okay.  She had the right ear was hurting a little bit, mostly left side.  She has been trying the eardrops, but they also seem to hurt.  In addition she said that she does not had her insurance restored so she was out of her migraine medications    Interval History 07/03/24  Suzanne presents today for left-sided ear pain.  She states that this is present all the time but recently has been getting worse.      REVIEW OF SYSTEMS    As above  PHYSICAL EXAM  GENERAL: No Acute Distress, Alert and Oriented, no Hoarseness, strong voice  EYES: EOMI, Anti-icteric  HENT:   Head: Normocephalic and atraumatic.   Face:  Symmetric, facial nerve intact, no sinus tenderness  Ear: See below  Mouth/Oral Cavity:  normal lips, Uvula is midline, no mucosal lesions, no trismus, no TMJ pain  Oropharynx/Larynx:  normal oropharynx,  Nose:Normal external nasal appearance.   NECK: Normal range of motion, no thyromegaly, trachea is midline, no lymphadenopathy, no neck masses, no crepitus    PROCEDURE  Bilateral ear exam  Ear was visualized microscope.  Cerumen is removed in the left ear so that full visualization could be accomplished.  She has a stable perforation with a dry middle ear.  The left side she has a perforation with slight moisture in the middle ear and ear canal.    ASSESSMENT/PLAN  Bessie is a very pleasant 49 y.o. female with     1. Impacted cerumen of left ear  Cerumen removed from the left ear under microscopic visualization  - WI REMOVAL IMPACTED CERUMEN INSTRUMENTATION UNILAT    2. Cholesteatoma of left

## 2024-07-10 ENCOUNTER — TELEPHONE (OUTPATIENT)
Dept: FAMILY MEDICINE CLINIC | Age: 49
End: 2024-07-10

## 2024-07-10 NOTE — TELEPHONE ENCOUNTER
Pt called in to request her medication ARIPiprazole (ABILIFY) 2 MG tablet  be increased to a higher MG dosage.

## 2024-07-11 NOTE — TELEPHONE ENCOUNTER
Pt returned call to office medical assistant is out of the office please advise. Pt stated she is having trouble with her phone so please leave a message per pt request

## 2024-07-12 NOTE — TELEPHONE ENCOUNTER
MATTHEW.. I was able to speak to Suzanne on Friday morning as she called back very tearful. She agreed moving forward with IOP. I called Marianne and she will be following up with her.

## 2024-07-15 RX ORDER — ARIPIPRAZOLE 2 MG/1
4 TABLET ORAL DAILY
Qty: 60 TABLET | Refills: 3 | Status: SHIPPED | OUTPATIENT
Start: 2024-07-15

## 2024-07-15 NOTE — TELEPHONE ENCOUNTER
She may start taking 4 mg of Abilify daily. This will be two of her 2 mg pills together. I sent more Abilify to the pharmacy today.

## 2024-07-16 NOTE — TELEPHONE ENCOUNTER
On 7/15/24, pt was a NC/NS for IOP intake. Writer was able to reach pt by phone who stated she was busy at work and return call.

## 2024-07-18 NOTE — TELEPHONE ENCOUNTER
MATTHEW... I called and spoke to Suzanne and she mentioned that she had to work therefore, she apologized for missing IOP. I got her scheduled for a follow up on Monday.

## 2024-07-22 ENCOUNTER — TELEMEDICINE (OUTPATIENT)
Dept: PSYCHIATRY | Age: 49
End: 2024-07-22
Payer: COMMERCIAL

## 2024-07-22 DIAGNOSIS — F50.2 BULIMIA NERVOSA: ICD-10-CM

## 2024-07-22 DIAGNOSIS — F33.2 MDD (MAJOR DEPRESSIVE DISORDER), RECURRENT SEVERE, WITHOUT PSYCHOSIS (HCC): Primary | ICD-10-CM

## 2024-07-22 PROCEDURE — 90833 PSYTX W PT W E/M 30 MIN: CPT | Performed by: STUDENT IN AN ORGANIZED HEALTH CARE EDUCATION/TRAINING PROGRAM

## 2024-07-22 PROCEDURE — 99214 OFFICE O/P EST MOD 30 MIN: CPT | Performed by: STUDENT IN AN ORGANIZED HEALTH CARE EDUCATION/TRAINING PROGRAM

## 2024-07-22 RX ORDER — NORTRIPTYLINE HYDROCHLORIDE 25 MG/1
CAPSULE ORAL
Qty: 60 CAPSULE | Refills: 3 | Status: SHIPPED | OUTPATIENT
Start: 2024-07-22

## 2024-07-22 NOTE — PATIENT INSTRUCTIONS
Cross-taper from Effexor XR (venlafaxine) to Pamelor (nortriptyline) as follows:  --Decrease Effexor XR to 150 mg daily for 1 week  --Decrease Effexor XR to 75 mg daily for 1 week. Then stop Effexor.  --Start Pamelor 25 mg nightly for 1 week  --If tolerating, increase Pamelor to 50 mg nightly and stay at this dose until next appointment.      Resources for Psychotherapy:  [facility] [phone]  [email or web address]://[email or web address]/    [facility]  [phone]       Resources for Trauma-Focused Therapy:    [facility]: [phone]  https://www.[employer]/stress-center/  The  Stress [place] offers multiple forms of therapy for PTSD and Acute Stress Disorder.    [facility]: [phone]  [email or web address]://www.[facility]/  Offers Eye Movement Desensitization and Reprocessing (EMDR) therapy.

## 2024-07-22 NOTE — PROGRESS NOTES
Patient is following up today via virtual:    1.Effexor, too much sweating and insomnia (also experiencing nightmares).  2.Abilify, working ok        PHQ:21  NICHOL:21    Previous Scores from OV on 6.24.2024  PHQ:21  NICHOL:18            
different things Nearly every day Nearly every day   Trouble relaxing Nearly every day Nearly every day   Being so restless that it is hard to sit still More than half the days Nearly every day   Becoming easily annoyed or irritable More than half the days Nearly every day   Feeling afraid as if something awful might happen More than half the days Several days   NICHOL-7 Total Score 18 19        AIMS Scale    N/A      Assessment and Plan:     Bessie Casper is a 49 y.o. White (non-) female with a history of depression, bulimia nervosa, and fobromyalgia who presents to outpatient primary care psychiatry on 7/22/2024 for psychiatric medication management.    Assessment:   The patient has recurrent MDD and bulimia nervosa. Has a hx of childhood trauma, which is likely a significant source of her chronic depression and eating disordered behavior. Does not quite meet full criteria for PTSD, but she also has a difficult time talking about her trauma and related symptoms, so it is difficult to assess whether or not she meets certain criteria for PTSD. She also just went through a divorce in late 2023/early 2024, and is experiencing worsening of her depression in the context of fallout from this process. She oscillates between bulimia nervosa and anorexia nervosa - binging-purging subtype, based on whether or not her BMI is underweight. Her BMI is currently within normal range, so she does not qualify as anorexic. Wellbutrin is contraindicated in bulimia nervosa (and anorexia) d/t increased risk for seizures.     After discussing different antidepressants including re-trialing SSRIs/SNRIs or trying TCAs for the first time, she would like to try a TCA. Future adjunctive agents for depression could include: Buspar, Rexulti, or Vraylar (taking care to avoid agents with sig increased chance for weight gain, given her sig fear of weight gain).     Cross-taper from Effexor to Pamelor, d/t side effects on Effexor and not

## 2024-08-09 NOTE — TELEPHONE ENCOUNTER
Last OV was on 7.22.24 No follow up appt made as of yet, I sent a My Chart message reminding her to schedule.

## 2024-08-12 RX ORDER — ARIPIPRAZOLE 2 MG/1
4 TABLET ORAL DAILY
Qty: 180 TABLET | Refills: 2 | OUTPATIENT
Start: 2024-08-12

## 2024-08-13 ENCOUNTER — TELEMEDICINE (OUTPATIENT)
Dept: PSYCHIATRY | Age: 49
End: 2024-08-13

## 2024-08-13 DIAGNOSIS — F50.2 BULIMIA NERVOSA: ICD-10-CM

## 2024-08-13 DIAGNOSIS — F33.2 MDD (MAJOR DEPRESSIVE DISORDER), RECURRENT SEVERE, WITHOUT PSYCHOSIS (HCC): Primary | ICD-10-CM

## 2024-08-13 ASSESSMENT — PATIENT HEALTH QUESTIONNAIRE - PHQ9
3. TROUBLE FALLING OR STAYING ASLEEP: NOT AT ALL
1. LITTLE INTEREST OR PLEASURE IN DOING THINGS: NEARLY EVERY DAY
7. TROUBLE CONCENTRATING ON THINGS, SUCH AS READING THE NEWSPAPER OR WATCHING TELEVISION: MORE THAN HALF THE DAYS
5. POOR APPETITE OR OVEREATING: NOT AT ALL
2. FEELING DOWN, DEPRESSED OR HOPELESS: MORE THAN HALF THE DAYS
4. FEELING TIRED OR HAVING LITTLE ENERGY: NOT AT ALL
SUM OF ALL RESPONSES TO PHQ9 QUESTIONS 1 & 2: 5
SUM OF ALL RESPONSES TO PHQ QUESTIONS 1-9: 10
8. MOVING OR SPEAKING SO SLOWLY THAT OTHER PEOPLE COULD HAVE NOTICED. OR THE OPPOSITE, BEING SO FIGETY OR RESTLESS THAT YOU HAVE BEEN MOVING AROUND A LOT MORE THAN USUAL: NOT AT ALL
6. FEELING BAD ABOUT YOURSELF - OR THAT YOU ARE A FAILURE OR HAVE LET YOURSELF OR YOUR FAMILY DOWN: NEARLY EVERY DAY
SUM OF ALL RESPONSES TO PHQ QUESTIONS 1-9: 10
9. THOUGHTS THAT YOU WOULD BE BETTER OFF DEAD, OR OF HURTING YOURSELF: NOT AT ALL

## 2024-08-13 ASSESSMENT — ANXIETY QUESTIONNAIRES
5. BEING SO RESTLESS THAT IT IS HARD TO SIT STILL: SEVERAL DAYS
1. FEELING NERVOUS, ANXIOUS, OR ON EDGE: NEARLY EVERY DAY
4. TROUBLE RELAXING: NEARLY EVERY DAY
GAD7 TOTAL SCORE: 16
2. NOT BEING ABLE TO STOP OR CONTROL WORRYING: NEARLY EVERY DAY
7. FEELING AFRAID AS IF SOMETHING AWFUL MIGHT HAPPEN: NOT AT ALL
3. WORRYING TOO MUCH ABOUT DIFFERENT THINGS: NEARLY EVERY DAY
6. BECOMING EASILY ANNOYED OR IRRITABLE: NEARLY EVERY DAY

## 2024-08-13 NOTE — PROGRESS NOTES
Patient is following up today via virtual:    PHQ:10  NICHOL:16    Previous Scores from OV on 7.22.2024  PHQ:21  NICHOL:18

## 2024-08-13 NOTE — PROGRESS NOTES
Outpatient Psychiatry Integrated Care   [facility]     Patient name: Bessie Casper  YOB: 1975  MRN: [id number]  Day of Service: 8/13/2024      Referring Primary Care Clinician: Hailey Guidry MD    Bessie Casper, was evaluated through a synchronous (real-time) audio-video encounter. The patient (or guardian if applicable) is aware that this is a billable service, which includes applicable co-pays. This Virtual Visit was conducted with patient's (and/or legal guardian's) consent. Patient identification was verified, and a caregiver was present when appropriate.   The patient was located at Home: [address] [place] [address]   [address]  [facility] [address]  Provider was located at Facility (Appt Dept): [address] [place] [address]  Confirm you are appropriately licensed, registered, or certified to deliver care in the [facility] where the patient is located as indicated above. If you are not or unsure, please re-schedule the visit: Yes, I confirm.      Total time spent for this encounter: Not billed by time    An electronic signature was used to authenticate this note.      Reason for Visit:     Chief Complaint   Patient presents with    Follow-up       CC: depression    HPI:     Bessie Casper is a 49 y.o. White (non-) female with a history of MDD, bulimia nervosa, fibromyalgia who presents to outpatient primary care psychiatry on 8/13/2024 for psychiatric medication management.      Today, the patient reports she is taking the Pamelor 50 mg qhs for the past 3-4 days, following a week of taking the 25 mg qhs. She is noticing her sleep is better and she crying less and has better energy. Feeling like she is motivated to exercise now, which is nice.     Her situational irritability is still bad, d/t her nerves. Her legs feel a little restless when she lies down, but this does not impact her ability to sleep. Had some mild lightheadedness at first

## 2024-08-16 ENCOUNTER — PATIENT MESSAGE (OUTPATIENT)
Dept: PSYCHIATRY | Age: 49
End: 2024-08-16

## 2024-08-16 RX ORDER — GALCANEZUMAB 120 MG/ML
120 INJECTION, SOLUTION SUBCUTANEOUS
Refills: 5 | OUTPATIENT
Start: 2024-08-16

## 2024-08-16 RX ORDER — GALCANEZUMAB 120 MG/ML
120 INJECTION, SOLUTION SUBCUTANEOUS
Qty: 1 ADJUSTABLE DOSE PRE-FILLED PEN SYRINGE | Refills: 0 | Status: SHIPPED | OUTPATIENT
Start: 2024-08-16 | End: 2024-09-17

## 2024-08-16 NOTE — TELEPHONE ENCOUNTER
Last Office Visit  -  04/10/2024  Next Office Visit  - n/a    Last Filled  -    Last UDS -    Contract -

## 2024-08-16 NOTE — TELEPHONE ENCOUNTER
Last Office Visit  -  4/10/24  Next Office Visit  -  n.a    Last Filled  -  2/28/24  Last UDS -    Contract -

## 2024-08-19 RX ORDER — NORTRIPTYLINE HYDROCHLORIDE 75 MG/1
75 CAPSULE ORAL NIGHTLY
Qty: 30 CAPSULE | Refills: 5 | Status: SHIPPED | OUTPATIENT
Start: 2024-08-19

## 2024-08-19 NOTE — TELEPHONE ENCOUNTER
Increase Pamelor to 75 mg qhs for depression/anxiety. Patient has been on 50 mg qhs for over a week and ready to go up on the dose again. Her GeneSight shows she may need higher doses than average, so it reasonable to go ahead and up-titrate the dose by 25-50 mg weekly, if tolerating.

## 2024-09-03 ENCOUNTER — PATIENT MESSAGE (OUTPATIENT)
Dept: PSYCHIATRY | Age: 49
End: 2024-09-03

## 2024-09-05 RX ORDER — NORTRIPTYLINE HYDROCHLORIDE 50 MG/1
50 CAPSULE ORAL NIGHTLY
Qty: 30 CAPSULE | Refills: 3 | Status: SHIPPED | OUTPATIENT
Start: 2024-09-05

## 2024-09-17 RX ORDER — GALCANEZUMAB 120 MG/ML
120 INJECTION, SOLUTION SUBCUTANEOUS
Qty: 1 ADJUSTABLE DOSE PRE-FILLED PEN SYRINGE | Refills: 0 | Status: SHIPPED | OUTPATIENT
Start: 2024-09-17

## 2024-09-19 DIAGNOSIS — G43.109 MIGRAINE WITH AURA AND WITHOUT STATUS MIGRAINOSUS, NOT INTRACTABLE: ICD-10-CM

## 2024-09-19 RX ORDER — RIZATRIPTAN BENZOATE 10 MG/1
TABLET, ORALLY DISINTEGRATING ORAL
Qty: 12 TABLET | Refills: 5 | Status: SHIPPED | OUTPATIENT
Start: 2024-09-19

## 2024-09-26 DIAGNOSIS — M54.2 NECK PAIN: ICD-10-CM

## 2024-09-26 RX ORDER — CYCLOBENZAPRINE HCL 10 MG
TABLET ORAL
Qty: 30 TABLET | Refills: 5 | Status: SHIPPED | OUTPATIENT
Start: 2024-09-26

## 2024-10-22 RX ORDER — GALCANEZUMAB 120 MG/ML
120 INJECTION, SOLUTION SUBCUTANEOUS
Qty: 1 ADJUSTABLE DOSE PRE-FILLED PEN SYRINGE | Refills: 0 | Status: SHIPPED | OUTPATIENT
Start: 2024-10-22

## 2024-10-22 NOTE — TELEPHONE ENCOUNTER
Pt requesting refill    Galcanezumab-gnlm (EMGALITY) 120 MG/ML       [facility]

## 2024-10-22 NOTE — TELEPHONE ENCOUNTER
Last Office Visit  -  5/10/2023  Next Office Visit  -      Last Filled  -  9/17/2024  Last UDS -    Contract -

## 2024-10-23 ENCOUNTER — PATIENT MESSAGE (OUTPATIENT)
Dept: PSYCHIATRY | Age: 49
End: 2024-10-23

## 2024-10-24 NOTE — TELEPHONE ENCOUNTER
I agree with letting pt know Dr Goodman will be back 11/11.     As for her piercing infx, pls ask pt to do an e-visit under rash. This way, she can upload a pic of the infected site. Thx.

## 2024-10-24 NOTE — TELEPHONE ENCOUNTER
For Psychiatry     Pt called back inquiring about getting FMLA paperwork done. She feels she is having a hard time with her depression and finds it hard to work. I advised pt to get the paperwork from employer and then to office. She was also advised that Dr Goodman is out of the office until next month. Please advise pt next steps         For Brea     Pt is requesting a medication for a piercing infection. I advised an appt but pt states she is unable to get off work. Please advise pt

## 2024-10-25 ENCOUNTER — TELEPHONE (OUTPATIENT)
Dept: FAMILY MEDICINE CLINIC | Age: 49
End: 2024-10-25

## 2024-10-25 NOTE — TELEPHONE ENCOUNTER
Per provider, VV or urgent care recommended for piercing. Contacted pt- Pt was notified that urgent care was recommended. Pt states that she cannot go to urgent care because she has to work. Pt was offered an appt with a virtualist but declined. Pt stated that she just had a doctor visit and cannot afford another visit and will just take the piercing out.

## 2024-11-07 ENCOUNTER — HOSPITAL ENCOUNTER (OUTPATIENT)
Dept: WOMENS IMAGING | Age: 49
Discharge: HOME | End: 2024-11-07
Payer: COMMERCIAL

## 2024-11-07 VITALS — HEIGHT: 65 IN | BODY MASS INDEX: 19.66 KG/M2 | WEIGHT: 118 LBS

## 2024-11-07 DIAGNOSIS — Z12.31 VISIT FOR SCREENING MAMMOGRAM: ICD-10-CM

## 2024-11-07 PROCEDURE — 77063 BREAST TOMOSYNTHESIS BI: CPT

## 2024-11-14 ENCOUNTER — TELEPHONE (OUTPATIENT)
Dept: WOMENS IMAGING | Age: 49
End: 2024-11-14

## 2024-11-18 RX ORDER — GALCANEZUMAB 120 MG/ML
120 INJECTION, SOLUTION SUBCUTANEOUS
Qty: 1 ADJUSTABLE DOSE PRE-FILLED PEN SYRINGE | Refills: 5 | Status: SHIPPED | OUTPATIENT
Start: 2024-11-18

## 2024-11-18 NOTE — TELEPHONE ENCOUNTER
Last Office Visit  -  4/10/24  Next Office Visit  -  n/a    Last Filled  -  10/22/24  Last UDS -    Contract -

## 2024-11-27 ENCOUNTER — HOSPITAL ENCOUNTER (OUTPATIENT)
Dept: WOMENS IMAGING | Age: 49
Discharge: HOME | End: 2024-11-27
Payer: COMMERCIAL

## 2024-11-27 ENCOUNTER — TELEPHONE (OUTPATIENT)
Dept: WOMENS IMAGING | Age: 49
End: 2024-11-27

## 2024-11-27 DIAGNOSIS — R92.8 ABNORMAL MAMMOGRAM: ICD-10-CM

## 2024-11-27 DIAGNOSIS — R92.8 ABNORMAL MAMMOGRAM: Primary | ICD-10-CM

## 2024-11-27 PROCEDURE — G0279 TOMOSYNTHESIS, MAMMO: HCPCS

## 2024-11-27 PROCEDURE — 76642 ULTRASOUND BREAST LIMITED: CPT

## 2024-11-27 NOTE — PROGRESS NOTES
[facility]  [facility]   [address], [address]  [place], [place] [address]   Phone: [phone]         ULTRASOUND BIOPSY EDUCATION    NAME:  Bessie Casper   YOB: 1975   MEDICAL RECORD NUMBER:  [id number]   TODAY'S DATE:  11/27/2024    Referring Physician: Dr. Guidry    Procedure: Ultrasound-guided Core Needle Breast Biopsy    Left Breast    Date of biopsy: 12/13/24    Patient taking blood thinners: no    Medicine allergies: yes - see chart    Special Instructions: n/a    Biopsy order form faxed to referring MD.          What is an Ultrasound Guided Breast Biopsy?  Ultrasound guided breast biopsy is a test that uses ultrasound to find an area of your breast where a tissue sample will be taken. The sample is then looked at under a microscope to check for signs of breast cancer.     Why is it done?   An Ultrasound biopsy is usually done to check for cancer in a lump or cyst found during a mammogram or ultrasound.   Preparing for the test?     * Take your medications as prescribed    You may eat and drink fluids before the test    Take a shower the evening or morning before the biopsy.  What happens before the test?  Images are taken to find the exact site to be biopsied.    Your skin is washed with an alcohol prep.      You will be given an injection of medication to numb your breast.   What happens during the test?     Once your breast is numb, a small cut (incision) is made.     Using the imaging, the doctor will guide the needle into the biopsy area.     A sample of breast tissue is taken through the needle.     A small \"Clip\" or Marker is inserted into your breast to ru the biopsy site.      The needle is removed and pressure put on the needle site to stop any bleeding.     A bandage will be placed over the site.     A post mammogram picture will be taken to document the clip placement.  How long does the test take? Approximately 60 minutes.  Most of

## 2024-11-27 NOTE — TELEPHONE ENCOUNTER
Dr. Guidry,    Your patient had breast imaging and was recommended for a breast biopsy.  She is aware, educated, and scheduled on: 12/13/24    Please sign the attached/pended order. Click \"Unsigned Order\" at bottom right. The order will open and show order information. Click \"Sign orders\".    Patient has had abnormal Breast Ultrasound.  Biopsy indicated for Left Breast.  Biopsy order pended below for provider signature.        Carol Conteh RN   Nurse Navigator   [facility]  [phone]

## 2024-12-13 ENCOUNTER — HOSPITAL ENCOUNTER (OUTPATIENT)
Dept: WOMENS IMAGING | Age: 49
Discharge: HOME | End: 2024-12-13
Payer: COMMERCIAL

## 2024-12-13 DIAGNOSIS — R92.8 ABNORMAL MAMMOGRAM: ICD-10-CM

## 2024-12-13 PROCEDURE — 19083 BX BREAST 1ST LESION US IMAG: CPT

## 2024-12-13 PROCEDURE — G0279 TOMOSYNTHESIS, MAMMO: HCPCS

## 2024-12-13 NOTE — PROGRESS NOTES
Patient in [facility] for breast biopsy. Radiologist reviewed procedure with patient, consent signed. Patient tolerated procedure well. Compression held. Site cleansed with chloraprep, steri strips and dry dressing applied. Ice pack provided. Reviewed discharge instructions with patient. Patient verbalized understanding and agreed to contact the Breast Navigator with any questions. Patient was A&Ox3 and steady on feet and discharged to waiting area.      [facility]   Discharge Instructions  [facility]  [address]  [address]  Telephone: [phone]   FAX [phone]    NAME:  Bessie Casper  YOB: 1975  GENDER: female  MEDICAL RECORD NUMBER:  [id number]  TODAY'S DATE:  12/13/2024    Discharge Instructions Breast Center:    Post Breast Biopsy Instructions     [x] You may remove your outer dressing in 24 hours and you may shower. The surgical glue will fall off after 7 days. Do not pick, scratch, or rub the film.     [x] Place a cold pack inside your bra on top of the dressing for at least 3 hours, removing it every 15 minutes for 15 minutes after your biopsy.     [x] Wear a firm fitting bra for at least 24 hours after your biopsy, including while you sleep.    [x] Place an ice pack inside your bra on top of the dressing for at least 3 hours, removing it every 15 minutes for 15 minutes after your biopsy.    [x] You may resume your held medications tomorrow, unless otherwise directed by your physician.    [x] Your physician has instructed you to take Tylenol (Acetaminophen) the day of your biopsy for any discomfort.    [x] Watch for excessive bleeding. If bleeding occurs apply pressure to site. Watch for signs of infection, increased pain, redness, swelling and heat.  If this occurs call your physician.     [x] Do not participate in any strenuous exercise for 48 hours after your biopsy and do not lift, pull or push anything over 5-10 lbs.      [x]

## 2024-12-17 ENCOUNTER — TELEPHONE (OUTPATIENT)
Dept: WOMENS IMAGING | Age: 49
End: 2024-12-17

## 2024-12-17 NOTE — TELEPHONE ENCOUNTER
Pathology results complete from breast biopsy. Radiologist confirms concordance.     Breast Navigator reviewed results of breast biopsy on patient's VM.  Results are negative for any malignancy on the pathology report.  The radiologist is recommending a bilateral breast mammogram in 6 months. Patient to call NN with any questions.  Path report faxed to referring physician.     Carol Conteh RN

## 2025-01-16 RX ORDER — VENLAFAXINE HYDROCHLORIDE 150 MG/1
CAPSULE, EXTENDED RELEASE ORAL
Qty: 90 CAPSULE | Refills: 1 | OUTPATIENT
Start: 2025-01-16

## 2025-01-29 ENCOUNTER — TELEMEDICINE (OUTPATIENT)
Dept: PSYCHIATRY | Age: 50
End: 2025-01-29
Payer: COMMERCIAL

## 2025-01-29 DIAGNOSIS — F50.20 BULIMIA NERVOSA, UNSPECIFIED SEVERITY: ICD-10-CM

## 2025-01-29 DIAGNOSIS — F33.2 MDD (MAJOR DEPRESSIVE DISORDER), RECURRENT SEVERE, WITHOUT PSYCHOSIS (HCC): Primary | ICD-10-CM

## 2025-01-29 PROCEDURE — 99213 OFFICE O/P EST LOW 20 MIN: CPT | Performed by: STUDENT IN AN ORGANIZED HEALTH CARE EDUCATION/TRAINING PROGRAM

## 2025-01-29 RX ORDER — PROPRANOLOL HYDROCHLORIDE 10 MG/1
10 TABLET ORAL 2 TIMES DAILY
Qty: 60 TABLET | Refills: 2 | Status: SHIPPED | OUTPATIENT
Start: 2025-01-29

## 2025-01-29 ASSESSMENT — ANXIETY QUESTIONNAIRES
2. NOT BEING ABLE TO STOP OR CONTROL WORRYING: NEARLY EVERY DAY
1. FEELING NERVOUS, ANXIOUS, OR ON EDGE: MORE THAN HALF THE DAYS
3. WORRYING TOO MUCH ABOUT DIFFERENT THINGS: NEARLY EVERY DAY
7. FEELING AFRAID AS IF SOMETHING AWFUL MIGHT HAPPEN: NOT AT ALL
5. BEING SO RESTLESS THAT IT IS HARD TO SIT STILL: NEARLY EVERY DAY
4. TROUBLE RELAXING: NEARLY EVERY DAY
6. BECOMING EASILY ANNOYED OR IRRITABLE: NEARLY EVERY DAY
GAD7 TOTAL SCORE: 17

## 2025-01-29 ASSESSMENT — PATIENT HEALTH QUESTIONNAIRE - PHQ9
SUM OF ALL RESPONSES TO PHQ QUESTIONS 1-9: 16
SUM OF ALL RESPONSES TO PHQ QUESTIONS 1-9: 16
3. TROUBLE FALLING OR STAYING ASLEEP: MORE THAN HALF THE DAYS
7. TROUBLE CONCENTRATING ON THINGS, SUCH AS READING THE NEWSPAPER OR WATCHING TELEVISION: NEARLY EVERY DAY
6. FEELING BAD ABOUT YOURSELF - OR THAT YOU ARE A FAILURE OR HAVE LET YOURSELF OR YOUR FAMILY DOWN: NEARLY EVERY DAY
5. POOR APPETITE OR OVEREATING: NOT AT ALL
SUM OF ALL RESPONSES TO PHQ QUESTIONS 1-9: 16
8. MOVING OR SPEAKING SO SLOWLY THAT OTHER PEOPLE COULD HAVE NOTICED. OR THE OPPOSITE, BEING SO FIGETY OR RESTLESS THAT YOU HAVE BEEN MOVING AROUND A LOT MORE THAN USUAL: NOT AT ALL
SUM OF ALL RESPONSES TO PHQ QUESTIONS 1-9: 16
2. FEELING DOWN, DEPRESSED OR HOPELESS: NEARLY EVERY DAY
1. LITTLE INTEREST OR PLEASURE IN DOING THINGS: NEARLY EVERY DAY
4. FEELING TIRED OR HAVING LITTLE ENERGY: MORE THAN HALF THE DAYS
SUM OF ALL RESPONSES TO PHQ9 QUESTIONS 1 & 2: 6
9. THOUGHTS THAT YOU WOULD BE BETTER OFF DEAD, OR OF HURTING YOURSELF: NOT AT ALL

## 2025-01-29 NOTE — PROGRESS NOTES
Outpatient Psychiatry Integrated Care   [facility]     Patient name: Bessie Casper  YOB: 1975  MRN: [id number]  Day of Service: 1/29/2025      Referring Primary Care Clinician: Hailey Guidry MD      Total Time: minutes: 21-30 minutes    Bessie Casper was evaluated through a synchronous (real-time) audio encounter. Patient identification was verified at the start of the visit. She (or guardian if applicable) is aware that this is a billable service, which includes applicable co-pays. This visit was conducted with the patient's (and/or legal guardian's) verbal consent. She has not had a related appointment within my department in the past 7 days or scheduled within the next 24 hours.   The patient was located at Other: her local pharmacy in [place], [place] .  The provider was located at Home (Appt Dept State): [place].  Confirm you are appropriately licensed, registered, or certified to deliver care in the state where the patient is located as indicated above. If you are not or unsure, please re-schedule the visit: Yes, I confirm.     Reason for Visit:     Chief Complaint   Patient presents with    Follow-up       CC: depression    HPI:     Bessie Casper is a 49 y.o. White (non-) female with a history of MDD, bulimia nervosa, fibromyalgia who presents to outpatient primary care psychiatry on 1/29/2025 for psychiatric medication management.      Today, the patient reports her son started talking to her again. She thought this would solve everything and she would feel better again, but has felt very tearful and worried about losing him again, ever since they reconnected. Has been taking the Pamelor 75 mg qhs since August. She felt better for a while, and then recently started crying all the time again and thought the medication was no longer working, after she reconnected with her son. Also started noticing restless legs recently, which she thinks is

## 2025-01-29 NOTE — PROGRESS NOTES
Patient is following up today via virtual and would like to address mood swings:    PHQ:16  NICHOL:17    Previous Scores from OV on 8.13.2024  PHQ:10  NICHOL:16

## 2025-02-20 RX ORDER — PROPRANOLOL HYDROCHLORIDE 10 MG/1
10 TABLET ORAL 2 TIMES DAILY
Qty: 180 TABLET | Refills: 1 | Status: SHIPPED | OUTPATIENT
Start: 2025-02-20

## 2025-02-27 ENCOUNTER — TELEPHONE (OUTPATIENT)
Dept: ENT CLINIC | Age: 50
End: 2025-02-27

## 2025-02-27 NOTE — TELEPHONE ENCOUNTER
John calling regarding pt's surgery in 2023 that was apparently denied/not paid by insurance. She is asking for a return call to discuss it further.

## 2025-03-07 ENCOUNTER — TELEPHONE (OUTPATIENT)
Dept: FAMILY MEDICINE CLINIC | Age: 50
End: 2025-03-07

## 2025-03-07 NOTE — TELEPHONE ENCOUNTER
Pt called, she would like her nortriptyline (PAMELOR) 50 MG capsule to be increased, she says they work for her to begin with and then it seems after a couple weeks, she's going downhill again. She would like to know if she can possibly increase the dosage and see if that helps. Please advise

## 2025-03-10 RX ORDER — NORTRIPTYLINE HYDROCHLORIDE 75 MG/1
75 CAPSULE ORAL NIGHTLY
Qty: 30 CAPSULE | Refills: 3 | Status: SHIPPED | OUTPATIENT
Start: 2025-03-10 | End: 2025-04-09

## 2025-03-10 NOTE — TELEPHONE ENCOUNTER
Patient was reportedly taking 75 mg qhs as of last visit in January, and the plan was to continue that dose. If she has actually been taking 50 mg at home recently, then it is okay to go up to 75 mg qhs at this time. I sent in some refills for 75 mg capsules today.

## 2025-03-10 NOTE — TELEPHONE ENCOUNTER
Good morning, Vanessa. It looks like Suzanne was on Pamelor 75 mg as per your 1/29/25 note. I'll defer to you to see what's up. Thanks for your help, as always!!

## 2025-03-25 ENCOUNTER — PATIENT MESSAGE (OUTPATIENT)
Dept: FAMILY MEDICINE CLINIC | Age: 50
End: 2025-03-25

## 2025-03-25 DIAGNOSIS — F41.9 ANXIETY: Primary | ICD-10-CM

## 2025-03-25 NOTE — TELEPHONE ENCOUNTER
Last Office Visit  -  ***  Next Office Visit  -  ***    Last Filled  -  ***  Last UDS -  ***  Contract -  ***

## 2025-03-26 RX ORDER — LORAZEPAM 0.5 MG/1
0.5 TABLET ORAL DAILY PRN
Qty: 12 TABLET | Refills: 0 | Status: SHIPPED | OUTPATIENT
Start: 2025-03-26 | End: 2025-04-25

## 2025-03-26 NOTE — TELEPHONE ENCOUNTER
Last Office Visit  -  5/10/2023  Next Office Visit  -      Last Filled  -  4/4/2016  Last UDS -    Contract -

## 2025-04-06 DIAGNOSIS — G43.109 MIGRAINE WITH AURA AND WITHOUT STATUS MIGRAINOSUS, NOT INTRACTABLE: ICD-10-CM

## 2025-04-07 RX ORDER — RIZATRIPTAN BENZOATE 10 MG/1
TABLET, ORALLY DISINTEGRATING ORAL
Qty: 12 TABLET | Refills: 5 | Status: SHIPPED | OUTPATIENT
Start: 2025-04-07

## 2025-04-07 NOTE — TELEPHONE ENCOUNTER
Last Office Visit  -  04/10/2024  Next Office Visit  -  04/09/2025    Last Filled  -    Last UDS -    Contract -

## 2025-04-09 ENCOUNTER — TELEMEDICINE (OUTPATIENT)
Dept: PSYCHIATRY | Age: 50
End: 2025-04-09
Payer: COMMERCIAL

## 2025-04-09 DIAGNOSIS — F43.12 CHRONIC POST-TRAUMATIC STRESS DISORDER (PTSD): ICD-10-CM

## 2025-04-09 DIAGNOSIS — F33.2 MDD (MAJOR DEPRESSIVE DISORDER), RECURRENT SEVERE, WITHOUT PSYCHOSIS (HCC): Primary | ICD-10-CM

## 2025-04-09 DIAGNOSIS — F50.20 BULIMIA NERVOSA, UNSPECIFIED SEVERITY: ICD-10-CM

## 2025-04-09 PROCEDURE — 99215 OFFICE O/P EST HI 40 MIN: CPT | Performed by: STUDENT IN AN ORGANIZED HEALTH CARE EDUCATION/TRAINING PROGRAM

## 2025-04-09 RX ORDER — DOXEPIN HYDROCHLORIDE 25 MG/1
CAPSULE ORAL
Qty: 120 CAPSULE | Refills: 1 | Status: SHIPPED | OUTPATIENT
Start: 2025-04-09

## 2025-04-09 ASSESSMENT — PATIENT HEALTH QUESTIONNAIRE - PHQ9
2. FEELING DOWN, DEPRESSED OR HOPELESS: MORE THAN HALF THE DAYS
1. LITTLE INTEREST OR PLEASURE IN DOING THINGS: MORE THAN HALF THE DAYS
5. POOR APPETITE OR OVEREATING: MORE THAN HALF THE DAYS
9. THOUGHTS THAT YOU WOULD BE BETTER OFF DEAD, OR OF HURTING YOURSELF: NOT AT ALL
7. TROUBLE CONCENTRATING ON THINGS, SUCH AS READING THE NEWSPAPER OR WATCHING TELEVISION: MORE THAN HALF THE DAYS
SUM OF ALL RESPONSES TO PHQ QUESTIONS 1-9: 14
SUM OF ALL RESPONSES TO PHQ QUESTIONS 1-9: 14
8. MOVING OR SPEAKING SO SLOWLY THAT OTHER PEOPLE COULD HAVE NOTICED. OR THE OPPOSITE, BEING SO FIGETY OR RESTLESS THAT YOU HAVE BEEN MOVING AROUND A LOT MORE THAN USUAL: NOT AT ALL
4. FEELING TIRED OR HAVING LITTLE ENERGY: MORE THAN HALF THE DAYS
SUM OF ALL RESPONSES TO PHQ QUESTIONS 1-9: 14
6. FEELING BAD ABOUT YOURSELF - OR THAT YOU ARE A FAILURE OR HAVE LET YOURSELF OR YOUR FAMILY DOWN: MORE THAN HALF THE DAYS
SUM OF ALL RESPONSES TO PHQ QUESTIONS 1-9: 14
3. TROUBLE FALLING OR STAYING ASLEEP: MORE THAN HALF THE DAYS

## 2025-04-09 ASSESSMENT — ANXIETY QUESTIONNAIRES
5. BEING SO RESTLESS THAT IT IS HARD TO SIT STILL: NEARLY EVERY DAY
3. WORRYING TOO MUCH ABOUT DIFFERENT THINGS: NEARLY EVERY DAY
7. FEELING AFRAID AS IF SOMETHING AWFUL MIGHT HAPPEN: NEARLY EVERY DAY
4. TROUBLE RELAXING: NEARLY EVERY DAY
2. NOT BEING ABLE TO STOP OR CONTROL WORRYING: NEARLY EVERY DAY
6. BECOMING EASILY ANNOYED OR IRRITABLE: NEARLY EVERY DAY
GAD7 TOTAL SCORE: 21
1. FEELING NERVOUS, ANXIOUS, OR ON EDGE: NEARLY EVERY DAY

## 2025-04-09 NOTE — PATIENT INSTRUCTIONS
Resources for Trauma-Focused Therapy:    [facility]: [phone]  https://www.[facility].[employer].me/stress-center/  The  Stress [place] offers multiple forms of therapy for PTSD and Acute Stress Disorder.    [facility]: [phone]  [email or web address]  Offers Eye Movement Desensitization and Reprocessing (EMDR) therapy.

## 2025-04-09 NOTE — PROGRESS NOTES
Patient is following up today via in virtual and would like to address constant crying and irritability:    PHQ:14  NICHOL:21    Previous Scores from OV on  1.29.2025  PHQ:16  NICHOL:17

## 2025-04-09 NOTE — PROGRESS NOTES
Outpatient Psychiatry Integrated Care   [facility]     Patient name: Bessie Casper  YOB: 1975  MRN: [id number]  Day of Service: 4/9/2025      Referring Primary Care Clinician: Hailey Guidry MD      Bessie Casper, was evaluated through a synchronous (real-time) audio-video encounter. The patient (or guardian if applicable) is aware that this is a billable service, which includes applicable co-pays. This Virtual Visit was conducted with patient's (and/or legal guardian's) consent. Patient identification was verified, and a caregiver was present when appropriate.   The patient was located at Home: [facility] [facility]   [address]  [place] [address]  Provider [facility] [place]  Confirm you are appropriately licensed, registered, or certified to deliver care in the state where the patient is located as indicated above. If you are not or unsure, please re-schedule the visit: Yes, I confirm.        Reason for Visit:     Chief Complaint   Patient presents with    Follow-up       CC: depression    HPI:     Bessie Casper is a 50 y.o. White (non-) female with a history of MDD, bulimia nervosa, fibromyalgia who presents to outpatient primary care psychiatry on 4/9/2025 for psychiatric medication management.      Today, the patient reports she quit taking the Pamelor 4 weeks ago. She feels like nothing is making her feel better. She feels irritable and angry. At first, the medication helped her cry less, like many of the meds have done, but then she went back to having no motivation or energy and feeling irritable. The benefits from her meds always seem to level out to go away over time. She also felt like the Pamelor was making her feel heavier and not wanting to get up an do anything. At first, it helped her sleep, which was good, but then that effect wore off over time.    She sees her 15 yo son once a week, but he doesn't connect

## 2025-05-06 ENCOUNTER — TELEMEDICINE (OUTPATIENT)
Dept: PSYCHIATRY | Age: 50
End: 2025-05-06
Payer: COMMERCIAL

## 2025-05-06 DIAGNOSIS — F43.12 CHRONIC POST-TRAUMATIC STRESS DISORDER (PTSD): ICD-10-CM

## 2025-05-06 DIAGNOSIS — F50.20 BULIMIA NERVOSA, UNSPECIFIED SEVERITY (HCC): ICD-10-CM

## 2025-05-06 DIAGNOSIS — F32.81 PMDD (PREMENSTRUAL DYSPHORIC DISORDER): ICD-10-CM

## 2025-05-06 DIAGNOSIS — F33.1 MDD (MAJOR DEPRESSIVE DISORDER), RECURRENT EPISODE, MODERATE (HCC): Primary | ICD-10-CM

## 2025-05-06 PROCEDURE — 99214 OFFICE O/P EST MOD 30 MIN: CPT | Performed by: STUDENT IN AN ORGANIZED HEALTH CARE EDUCATION/TRAINING PROGRAM

## 2025-05-06 RX ORDER — CITALOPRAM HYDROBROMIDE 20 MG/1
20 TABLET ORAL DAILY
Qty: 30 TABLET | Refills: 2 | Status: SHIPPED | OUTPATIENT
Start: 2025-05-06

## 2025-05-06 ASSESSMENT — ANXIETY QUESTIONNAIRES
3. WORRYING TOO MUCH ABOUT DIFFERENT THINGS: MORE THAN HALF THE DAYS
6. BECOMING EASILY ANNOYED OR IRRITABLE: NEARLY EVERY DAY
7. FEELING AFRAID AS IF SOMETHING AWFUL MIGHT HAPPEN: NEARLY EVERY DAY
GAD7 TOTAL SCORE: 16
2. NOT BEING ABLE TO STOP OR CONTROL WORRYING: MORE THAN HALF THE DAYS
1. FEELING NERVOUS, ANXIOUS, OR ON EDGE: MORE THAN HALF THE DAYS
4. TROUBLE RELAXING: MORE THAN HALF THE DAYS
5. BEING SO RESTLESS THAT IT IS HARD TO SIT STILL: MORE THAN HALF THE DAYS

## 2025-05-06 ASSESSMENT — PATIENT HEALTH QUESTIONNAIRE - PHQ9
3. TROUBLE FALLING OR STAYING ASLEEP: MORE THAN HALF THE DAYS
SUM OF ALL RESPONSES TO PHQ QUESTIONS 1-9: 9
SUM OF ALL RESPONSES TO PHQ QUESTIONS 1-9: 9
7. TROUBLE CONCENTRATING ON THINGS, SUCH AS READING THE NEWSPAPER OR WATCHING TELEVISION: NOT AT ALL
5. POOR APPETITE OR OVEREATING: MORE THAN HALF THE DAYS
9. THOUGHTS THAT YOU WOULD BE BETTER OFF DEAD, OR OF HURTING YOURSELF: NOT AT ALL
SUM OF ALL RESPONSES TO PHQ QUESTIONS 1-9: 9
1. LITTLE INTEREST OR PLEASURE IN DOING THINGS: MORE THAN HALF THE DAYS
6. FEELING BAD ABOUT YOURSELF - OR THAT YOU ARE A FAILURE OR HAVE LET YOURSELF OR YOUR FAMILY DOWN: SEVERAL DAYS
8. MOVING OR SPEAKING SO SLOWLY THAT OTHER PEOPLE COULD HAVE NOTICED. OR THE OPPOSITE, BEING SO FIGETY OR RESTLESS THAT YOU HAVE BEEN MOVING AROUND A LOT MORE THAN USUAL: NOT AT ALL
2. FEELING DOWN, DEPRESSED OR HOPELESS: SEVERAL DAYS
SUM OF ALL RESPONSES TO PHQ QUESTIONS 1-9: 9
4. FEELING TIRED OR HAVING LITTLE ENERGY: SEVERAL DAYS

## 2025-05-06 NOTE — PROGRESS NOTES
Patient is following up today via in person/virtual and would like to address:    PHQ:9  NICHOL:16    Previous Scores from   PHQ:14  NICHOL:21

## 2025-05-06 NOTE — PROGRESS NOTES
Outpatient Psychiatry Integrated Care   [facility]     Patient name: Bessie Casper  YOB: 1975  MRN: [id number]  Day of Service: 5/6/2025      Referring Primary Care Clinician: Hailey Guidry MD      Bessie Casper, was evaluated through a synchronous (real-time) audio-video encounter. The patient (or guardian if applicable) is aware that this is a billable service, which includes applicable co-pays. This Virtual Visit was conducted with patient's (and/or legal guardian's) consent. Patient identification was verified, and a caregiver was present when appropriate.   The patient was located at Home: [facility]   [address]  [place] [address]  Provider [facility] [place]  Confirm you are appropriately licensed, registered, or certified to deliver care in the state where the patient is located as indicated above. If you are not or unsure, please re-schedule the visit: Yes, I confirm.        Reason for Visit:     Chief Complaint   Patient presents with    Follow-up       CC: depression    HPI:     Bessie Casper is a 50 y.o. White (non-) female with a history of MDD, bulimia nervosa, fibromyalgia who presents to outpatient primary care psychiatry on 5/6/2025 for psychiatric medication management.      Today, the patient reports she quit taking the doxepin 2 days ago and feels much better off of it. Was more irritable on the medication. She felt better on the low dose of  Pamelor, despite the restless legs at night. Propanolol didn't help with the restless legs. But she would rather be back on the Pamelor. Has gotten feedback that she seemed happier on this medication. But Pamelor didn't help with the eating behaviors and premenstrual dysphoria    She has also noticed her depression is 200% worse for 2 whole weeks prior to her menstrual period. This has always been the case for the last 20-30 years. She doesn't want to be

## 2025-05-29 ENCOUNTER — TELEMEDICINE (OUTPATIENT)
Dept: PSYCHIATRY | Age: 50
End: 2025-05-29
Payer: COMMERCIAL

## 2025-05-29 DIAGNOSIS — F43.12 CHRONIC POST-TRAUMATIC STRESS DISORDER (PTSD): ICD-10-CM

## 2025-05-29 DIAGNOSIS — F50.20 BULIMIA NERVOSA, UNSPECIFIED SEVERITY (HCC): ICD-10-CM

## 2025-05-29 DIAGNOSIS — F33.1 MDD (MAJOR DEPRESSIVE DISORDER), RECURRENT EPISODE, MODERATE (HCC): Primary | ICD-10-CM

## 2025-05-29 DIAGNOSIS — F32.81 PMDD (PREMENSTRUAL DYSPHORIC DISORDER): ICD-10-CM

## 2025-05-29 PROCEDURE — 99214 OFFICE O/P EST MOD 30 MIN: CPT | Performed by: STUDENT IN AN ORGANIZED HEALTH CARE EDUCATION/TRAINING PROGRAM

## 2025-05-29 RX ORDER — NORTRIPTYLINE HYDROCHLORIDE 25 MG/1
CAPSULE ORAL
Qty: 90 CAPSULE | Refills: 2 | Status: SHIPPED | OUTPATIENT
Start: 2025-05-29 | End: 2025-06-12

## 2025-05-29 NOTE — PROGRESS NOTES
Patient is following up today via virtual and states that she is not feeling well. She is feeling sad, crying spells, and not sleeping.  (Per your last note).  Genesight shows higher than avg doses of Celexa may be required.

## 2025-05-29 NOTE — PROGRESS NOTES
Outpatient Psychiatry Integrated Care   [facility]     Patient name: Bessie Casper  YOB: 1975  MRN: [id number]  Day of Service: 5/29/2025      Referring Primary Care Clinician: Hailey Guidry MD    Bessie Casper, was evaluated through a synchronous (real-time) audio-video encounter. The patient (or guardian if applicable) is aware that this is a billable service, which includes applicable co-pays. This Virtual Visit was conducted with patient's (and/or legal guardian's) consent. Patient identification was verified, and a caregiver was present when appropriate.   The patient was located at Home: [facility]   [address]  [place] [address]  Provider was located at Home (Appt Dept State): [place]  Confirm you are appropriately licensed, registered, or certified to deliver care in the state where the patient is located as indicated above. If you are not or unsure, please re-schedule the visit: Yes, I confirm.      Reason for Visit:     Chief Complaint   Patient presents with    Follow-up       CC: depression    HPI:     Bessie Casper is a 50 y.o. White (non-) female with a history of MDD, bulimia nervosa, fibromyalgia who presents to outpatient primary care psychiatry on 5/29/2025 for psychiatric medication management.      Today, the patient reports she has ongoing headaches. Her headaches pre-existed psych meds, but became worse with various antidepressants including the current one Celexa. Low libido on the Celexa as well. Versus the Pamelor, she felt like she didn't have as many headaches and didn't have low libido. The only problems with the Pamelor was restless legs at night, but says she would rather deal with that than her current side effects.     Still feels sad for no reason and crying all the time. The Pamelor helped her sleep and her mood. She also got feedback from a loved one that she seemed happier on this medication.    She is trying

## 2025-06-13 DIAGNOSIS — G43.909 MIGRAINE WITHOUT STATUS MIGRAINOSUS, NOT INTRACTABLE, UNSPECIFIED MIGRAINE TYPE: Primary | ICD-10-CM

## 2025-06-13 RX ORDER — GALCANEZUMAB 120 MG/ML
240 INJECTION, SOLUTION SUBCUTANEOUS ONCE
Qty: 2 ADJUSTABLE DOSE PRE-FILLED PEN SYRINGE | Refills: 0 | Status: SHIPPED | OUTPATIENT
Start: 2025-06-13 | End: 2025-06-13

## 2025-06-13 NOTE — TELEPHONE ENCOUNTER
Last Office Visit  -  4/10/24  Next Office Visit  -  n/a    Last Filled  -    Last UDS -    Contract -

## 2025-06-13 NOTE — TELEPHONE ENCOUNTER
Has she been off of this medication. The only reason to take the starter dose is if she has not been taking it monthly

## 2025-06-16 DIAGNOSIS — G43.909 MIGRAINE WITHOUT STATUS MIGRAINOSUS, NOT INTRACTABLE, UNSPECIFIED MIGRAINE TYPE: ICD-10-CM

## 2025-06-16 RX ORDER — GALCANEZUMAB 120 MG/ML
240 INJECTION, SOLUTION SUBCUTANEOUS ONCE
Qty: 2 ADJUSTABLE DOSE PRE-FILLED PEN SYRINGE | Refills: 0 | Status: SHIPPED | OUTPATIENT
Start: 2025-06-16 | End: 2025-06-16

## 2025-06-19 ENCOUNTER — TELEPHONE (OUTPATIENT)
Dept: FAMILY MEDICINE CLINIC | Age: 50
End: 2025-06-19

## 2025-06-24 DIAGNOSIS — F41.9 ANXIETY: ICD-10-CM

## 2025-06-24 NOTE — TELEPHONE ENCOUNTER
Last Office Visit  -  4/10/24  Next Office Visit  -  7/28/25    Last Filled  -  3/26/25  Last UDS -  n/a  Contract -  n/a

## 2025-06-25 ENCOUNTER — PATIENT MESSAGE (OUTPATIENT)
Dept: FAMILY MEDICINE CLINIC | Age: 50
End: 2025-06-25

## 2025-06-25 RX ORDER — LORAZEPAM 0.5 MG/1
0.5 TABLET ORAL DAILY PRN
Qty: 12 TABLET | Refills: 0 | Status: SHIPPED | OUTPATIENT
Start: 2025-06-25 | End: 2025-07-25

## 2025-06-25 NOTE — TELEPHONE ENCOUNTER
Patient has not seen PCP since 2023-needs a physical appointment for follow-up and to discuss migraines and migraine management.  She is also overdue for lab work.  Dr. Giudry has appointments available on 7/14/2025.      Recommend following up with Dr. Goodman for depression and anxiety as she is the provider the patient is working with on this at this time.

## 2025-07-01 ENCOUNTER — TELEMEDICINE (OUTPATIENT)
Dept: PSYCHIATRY | Age: 50
End: 2025-07-01
Payer: COMMERCIAL

## 2025-07-01 DIAGNOSIS — F50.20 BULIMIA NERVOSA, UNSPECIFIED SEVERITY (HCC): ICD-10-CM

## 2025-07-01 DIAGNOSIS — F32.81 PMDD (PREMENSTRUAL DYSPHORIC DISORDER): ICD-10-CM

## 2025-07-01 DIAGNOSIS — F43.12 CHRONIC POST-TRAUMATIC STRESS DISORDER (PTSD): ICD-10-CM

## 2025-07-01 DIAGNOSIS — F33.1 MDD (MAJOR DEPRESSIVE DISORDER), RECURRENT EPISODE, MODERATE (HCC): Primary | ICD-10-CM

## 2025-07-01 PROCEDURE — 90833 PSYTX W PT W E/M 30 MIN: CPT | Performed by: STUDENT IN AN ORGANIZED HEALTH CARE EDUCATION/TRAINING PROGRAM

## 2025-07-01 PROCEDURE — 99214 OFFICE O/P EST MOD 30 MIN: CPT | Performed by: STUDENT IN AN ORGANIZED HEALTH CARE EDUCATION/TRAINING PROGRAM

## 2025-07-01 RX ORDER — BUSPIRONE HYDROCHLORIDE 15 MG/1
TABLET ORAL
Qty: 60 TABLET | Refills: 2 | Status: SHIPPED | OUTPATIENT
Start: 2025-07-01

## 2025-07-01 RX ORDER — NORTRIPTYLINE HYDROCHLORIDE 75 MG/1
75 CAPSULE ORAL NIGHTLY
Qty: 90 CAPSULE | Refills: 0 | Status: SHIPPED | OUTPATIENT
Start: 2025-07-01

## 2025-07-01 ASSESSMENT — PATIENT HEALTH QUESTIONNAIRE - PHQ9
1. LITTLE INTEREST OR PLEASURE IN DOING THINGS: SEVERAL DAYS
5. POOR APPETITE OR OVEREATING: SEVERAL DAYS
7. TROUBLE CONCENTRATING ON THINGS, SUCH AS READING THE NEWSPAPER OR WATCHING TELEVISION: NOT AT ALL
10. IF YOU CHECKED OFF ANY PROBLEMS, HOW DIFFICULT HAVE THESE PROBLEMS MADE IT FOR YOU TO DO YOUR WORK, TAKE CARE OF THINGS AT HOME, OR GET ALONG WITH OTHER PEOPLE: NOT DIFFICULT AT ALL
8. MOVING OR SPEAKING SO SLOWLY THAT OTHER PEOPLE COULD HAVE NOTICED. OR THE OPPOSITE, BEING SO FIGETY OR RESTLESS THAT YOU HAVE BEEN MOVING AROUND A LOT MORE THAN USUAL: NOT AT ALL
2. FEELING DOWN, DEPRESSED OR HOPELESS: SEVERAL DAYS
6. FEELING BAD ABOUT YOURSELF - OR THAT YOU ARE A FAILURE OR HAVE LET YOURSELF OR YOUR FAMILY DOWN: SEVERAL DAYS
SUM OF ALL RESPONSES TO PHQ QUESTIONS 1-9: 7
4. FEELING TIRED OR HAVING LITTLE ENERGY: SEVERAL DAYS
SUM OF ALL RESPONSES TO PHQ QUESTIONS 1-9: 7
SUM OF ALL RESPONSES TO PHQ QUESTIONS 1-9: 7
3. TROUBLE FALLING OR STAYING ASLEEP: MORE THAN HALF THE DAYS
SUM OF ALL RESPONSES TO PHQ QUESTIONS 1-9: 7
9. THOUGHTS THAT YOU WOULD BE BETTER OFF DEAD, OR OF HURTING YOURSELF: NOT AT ALL

## 2025-07-01 ASSESSMENT — ANXIETY QUESTIONNAIRES
6. BECOMING EASILY ANNOYED OR IRRITABLE: MORE THAN HALF THE DAYS
2. NOT BEING ABLE TO STOP OR CONTROL WORRYING: SEVERAL DAYS
4. TROUBLE RELAXING: SEVERAL DAYS
GAD7 TOTAL SCORE: 11
3. WORRYING TOO MUCH ABOUT DIFFERENT THINGS: MORE THAN HALF THE DAYS
1. FEELING NERVOUS, ANXIOUS, OR ON EDGE: SEVERAL DAYS
5. BEING SO RESTLESS THAT IT IS HARD TO SIT STILL: MORE THAN HALF THE DAYS
7. FEELING AFRAID AS IF SOMETHING AWFUL MIGHT HAPPEN: MORE THAN HALF THE DAYS

## 2025-07-01 NOTE — PROGRESS NOTES
Outpatient Psychiatry Integrated Care   [facility]     Patient name: Bessie Casper  YOB: 1975  MRN: [id number]  Day of Service: 7/1/2025      Referring Primary Care Clinician: Hailey Guidry MD    Bessie Casper, was evaluated through a synchronous (real-time) audio-video encounter. The patient (or guardian if applicable) is aware that this is a billable service, which includes applicable co-pays. This Virtual Visit was conducted with patient's (and/or legal guardian's) consent. Patient identification was verified, and a caregiver was present when appropriate.   The patient was located at Home: [address] [place] [address]   [address]  [facility] [address]  Provider was located at Facility (Appt Dept): [address] [place] [address]  Confirm you are appropriately licensed, registered, or certified to deliver care in the [facility] where the patient is located as indicated above. If you are not or unsure, please re-schedule the visit: Yes, I confirm.        Reason for Visit:     Chief Complaint   Patient presents with    Follow-up       CC: depression    HPI:     Bessie Casper is a 50 y.o. White (non-) female with a history of MDD, bulimia nervosa, fibromyalgia who presents to outpatient primary care psychiatry on 7/1/2025 for psychiatric medication management.      Today, the patient reports she is stressed out d/t moving out tomorrow. Didn't sleep much last night.     Her migraines have been really bad, happening every day. She is planning to get back in with Dr. Guidry to discuss this.    She likes that the Pamelor helps her to cry less. She still feels sad, but not having crying spells. Also helps her to fall asleep. She wants to keep taking the Pamelor because of the benefits, even though her chronic migraines seem a bit worse on it.    She is open to adding a second medication to help with her depression and anxiety. She has tried Buspar in the

## 2025-07-01 NOTE — PROGRESS NOTES
Patient is following up today via virtual:    PHQ:7  NICHOL:11    Previous Scores from OV on 5.29.2025  PHQ:9  NICHOL:16

## 2025-07-01 NOTE — PATIENT INSTRUCTIONS
[facility]: Call [phone]) to schedule for Outpatient Psychiatry.  [email or web address]

## 2025-07-11 ENCOUNTER — PATIENT MESSAGE (OUTPATIENT)
Dept: PSYCHIATRY | Age: 50
End: 2025-07-11

## 2025-07-25 ENCOUNTER — HOSPITAL ENCOUNTER (OUTPATIENT)
Dept: WOMENS IMAGING | Age: 50
Discharge: HOME | End: 2025-07-25
Payer: COMMERCIAL

## 2025-07-25 ENCOUNTER — OFFICE VISIT (OUTPATIENT)
Dept: FAMILY MEDICINE CLINIC | Age: 50
End: 2025-07-25
Payer: COMMERCIAL

## 2025-07-25 VITALS — BODY MASS INDEX: 20.33 KG/M2 | WEIGHT: 122 LBS | HEIGHT: 65 IN

## 2025-07-25 VITALS
WEIGHT: 126 LBS | HEIGHT: 65 IN | HEART RATE: 82 BPM | BODY MASS INDEX: 20.99 KG/M2 | SYSTOLIC BLOOD PRESSURE: 120 MMHG | OXYGEN SATURATION: 92 % | DIASTOLIC BLOOD PRESSURE: 64 MMHG

## 2025-07-25 DIAGNOSIS — Z00.00 WELLNESS EXAMINATION: ICD-10-CM

## 2025-07-25 DIAGNOSIS — Z00.00 WELLNESS EXAMINATION: Primary | ICD-10-CM

## 2025-07-25 DIAGNOSIS — Z86.69 HISTORY OF MIGRAINE: ICD-10-CM

## 2025-07-25 DIAGNOSIS — N92.1 MENORRHAGIA WITH IRREGULAR CYCLE: ICD-10-CM

## 2025-07-25 DIAGNOSIS — F41.8 DEPRESSION WITH ANXIETY: ICD-10-CM

## 2025-07-25 DIAGNOSIS — R92.8 ABNORMAL FINDING ON BREAST IMAGING: ICD-10-CM

## 2025-07-25 DIAGNOSIS — L65.9 HAIR LOSS: ICD-10-CM

## 2025-07-25 DIAGNOSIS — N92.6 IRREGULAR PERIODS: ICD-10-CM

## 2025-07-25 LAB
25(OH)D3 SERPL-MCNC: 69.5 NG/ML
ALBUMIN SERPL-MCNC: 4.4 G/DL (ref 3.4–5)
ALBUMIN/GLOB SERPL: 1.9 {RATIO} (ref 1.1–2.2)
ALP SERPL-CCNC: 50 U/L (ref 40–129)
ALT SERPL-CCNC: 23 U/L (ref 10–40)
ANION GAP SERPL CALCULATED.3IONS-SCNC: 11 MMOL/L (ref 3–16)
AST SERPL-CCNC: 19 U/L (ref 15–37)
BASOPHILS # BLD: 0 K/UL (ref 0–0.2)
BASOPHILS NFR BLD: 0.3 %
BILIRUB SERPL-MCNC: 0.8 MG/DL (ref 0–1)
BUN SERPL-MCNC: 18 MG/DL (ref 7–20)
CALCIUM SERPL-MCNC: 10 MG/DL (ref 8.3–10.6)
CHLORIDE SERPL-SCNC: 102 MMOL/L (ref 99–110)
CHOLEST SERPL-MCNC: 160 MG/DL (ref 0–199)
CO2 SERPL-SCNC: 26 MMOL/L (ref 21–32)
CREAT SERPL-MCNC: 1 MG/DL (ref 0.6–1.1)
DEPRECATED RDW RBC AUTO: 17.5 % (ref 12.4–15.4)
EOSINOPHIL # BLD: 0.1 K/UL (ref 0–0.6)
EOSINOPHIL NFR BLD: 1.9 %
FERRITIN SERPL IA-MCNC: 23.3 NG/ML (ref 15–150)
FSH SERPL-ACNC: 28 MIU/ML
GFR SERPLBLD CREATININE-BSD FMLA CKD-EPI: 69 ML/MIN/{1.73_M2}
GLUCOSE SERPL-MCNC: 92 MG/DL (ref 70–99)
HCT VFR BLD AUTO: 39 % (ref 36–48)
HDLC SERPL-MCNC: 89 MG/DL (ref 40–60)
HGB BLD-MCNC: 12.8 G/DL (ref 12–16)
IRON SATN MFR SERPL: 8 % (ref 15–50)
IRON SERPL-MCNC: 27 UG/DL (ref 37–145)
LDL CHOLESTEROL: 63 MG/DL
LH SERPL-ACNC: 48.3 MIU/ML
LYMPHOCYTES # BLD: 0.9 K/UL (ref 1–5.1)
LYMPHOCYTES NFR BLD: 18.1 %
MCH RBC QN AUTO: 28 PG (ref 26–34)
MCHC RBC AUTO-ENTMCNC: 32.9 G/DL (ref 31–36)
MCV RBC AUTO: 84.9 FL (ref 80–100)
MONOCYTES # BLD: 0.3 K/UL (ref 0–1.3)
MONOCYTES NFR BLD: 6 %
NEUTROPHILS # BLD: 3.7 K/UL (ref 1.7–7.7)
NEUTROPHILS NFR BLD: 73.7 %
PLATELET # BLD AUTO: 272 K/UL (ref 135–450)
PMV BLD AUTO: 10.5 FL (ref 5–10.5)
POTASSIUM SERPL-SCNC: 4.5 MMOL/L (ref 3.5–5.1)
PROGEST SERPL-MCNC: 0.38 NG/ML
PROT SERPL-MCNC: 6.7 G/DL (ref 6.4–8.2)
RBC # BLD AUTO: 4.59 M/UL (ref 4–5.2)
SODIUM SERPL-SCNC: 139 MMOL/L (ref 136–145)
TIBC SERPL-MCNC: 331 UG/DL (ref 260–445)
TRIGL SERPL-MCNC: 39 MG/DL (ref 0–150)
TSH SERPL DL<=0.005 MIU/L-ACNC: 1.67 UIU/ML (ref 0.27–4.2)
VLDLC SERPL CALC-MCNC: 8 MG/DL
WBC # BLD AUTO: 5 K/UL (ref 4–11)

## 2025-07-25 PROCEDURE — G0279 TOMOSYNTHESIS, MAMMO: HCPCS

## 2025-07-25 PROCEDURE — 99396 PREV VISIT EST AGE 40-64: CPT | Performed by: NURSE PRACTITIONER

## 2025-07-25 PROCEDURE — 99213 OFFICE O/P EST LOW 20 MIN: CPT | Performed by: NURSE PRACTITIONER

## 2025-07-25 RX ORDER — RIMEGEPANT SULFATE 75 MG/75MG
TABLET, ORALLY DISINTEGRATING ORAL
Qty: 16 TABLET | Refills: 0 | Status: SHIPPED | OUTPATIENT
Start: 2025-07-25

## 2025-07-25 SDOH — ECONOMIC STABILITY: FOOD INSECURITY: WITHIN THE PAST 12 MONTHS, THE FOOD YOU BOUGHT JUST DIDN'T LAST AND YOU DIDN'T HAVE MONEY TO GET MORE.: NEVER TRUE

## 2025-07-25 SDOH — ECONOMIC STABILITY: FOOD INSECURITY: WITHIN THE PAST 12 MONTHS, YOU WORRIED THAT YOUR FOOD WOULD RUN OUT BEFORE YOU GOT MONEY TO BUY MORE.: NEVER TRUE

## 2025-07-25 ASSESSMENT — ENCOUNTER SYMPTOMS
RESPIRATORY NEGATIVE: 1
GASTROINTESTINAL NEGATIVE: 1

## 2025-07-25 NOTE — PATIENT INSTRUCTIONS
[facility]one [facility] to help balance hormone levels.

## 2025-07-25 NOTE — PROGRESS NOTES
Patient: Bessie Casper is a 50 y.o. female who presents today with the following Chief Complaint(s):  Chief Complaint   Patient presents with    Other     migarines, horomon therapy. depression. Pt says she's here for physical and blood work       Assessment/Plan:    Assessment & Plan  1. Migraine.  - Issues with Emgality due to insurance approval; Maxalt (rizatriptan) causes nausea when combined with antidepressants.  - Nurtec (rimegepant) prescription sent to pharmacy; previously used for migraine prevention.  - Multiple medications tried: nortriptyline, buspirone, Relpax (eletriptan), amitriptyline, sumatriptan,Emgality,Rizatriptan and  Botox injections.  - Prior authorization needed for Nurtec; insurance approval process discussed.    2. Depression.  - Reports extreme mood swings, anxiety, persistent depression despite two antidepressants.  - Advised to continue current medication regimen; consider seeing a therapist as recommended by psychiatrist.  - Interest in seeing another psychiatrist for a second opinion expressed.  - Dry mouth reported as a side effect; advised to increase water intake and use sugar-free candy or mints.    3. Menstrual irregularities.  - Prolonged and irregular menstrual bleeding; periods lasting up to three months, then stopping for a week.  - No period for the past 5-6 weeks; advised to schedule an appointment with gynecologist.  - Blood work ordered to check hormone levels, CBC, and iron levels.      4. Constipation.  - Difficulty with bowel movements; relies on senna laxatives twice a week.  - Stopped taking Linzess due to side effects; advised to continue using senna as needed.  - Increase water intake recommended to help with constipation.  - Iron supplements noted to potentially cause constipation.       1. Wellness examination  Patient's past medical history, surgical history, family history, medications,  and allergies  were all reviewed and updated as appropriate today.

## 2025-07-29 LAB
ESTRADIOL SERPL HS-MCNC: 198.9 PG/ML
ESTROGEN SERPL CALC-MCNC: 378.6 PG/ML
ESTRONE SERPL-MCNC: 179.7 PG/ML

## 2025-07-30 LAB
SHBG SERPL-SCNC: 124 NMOL/L (ref 25–122)
TESTOST FREE SERPL-MCNC: 1.5 PG/ML (ref 1.1–5.8)
TESTOST SERPL-MCNC: 22 NG/DL (ref 8–48)

## 2025-08-17 ENCOUNTER — OFFICE VISIT (OUTPATIENT)
Age: 50
End: 2025-08-17

## 2025-08-17 VITALS
OXYGEN SATURATION: 97 % | DIASTOLIC BLOOD PRESSURE: 67 MMHG | HEIGHT: 65 IN | HEART RATE: 75 BPM | SYSTOLIC BLOOD PRESSURE: 108 MMHG | TEMPERATURE: 98.7 F | WEIGHT: 125.6 LBS | BODY MASS INDEX: 20.93 KG/M2

## 2025-08-17 DIAGNOSIS — M25.551 RIGHT HIP PAIN: Primary | ICD-10-CM

## 2025-08-17 PROBLEM — A60.00 GENITAL HERPES SIMPLEX: Status: ACTIVE | Noted: 2025-04-17

## 2025-08-17 PROBLEM — M43.10 SPONDYLOLISTHESIS: Status: ACTIVE | Noted: 2022-01-13

## 2025-08-17 PROBLEM — M54.50 CHRONIC LOW BACK PAIN: Status: ACTIVE | Noted: 2021-12-07

## 2025-08-17 PROBLEM — N81.10 FEMALE CYSTOCELE: Status: ACTIVE | Noted: 2025-04-17

## 2025-08-17 PROBLEM — K05.30 PERIODONTITIS: Chronic | Status: ACTIVE | Noted: 2025-05-20

## 2025-08-17 PROBLEM — M51.369 DEGENERATIVE DISC DISEASE, LUMBAR: Status: ACTIVE | Noted: 2022-01-13

## 2025-08-17 PROBLEM — R68.82 REDUCED LIBIDO: Status: ACTIVE | Noted: 2025-04-17

## 2025-08-17 PROBLEM — G89.29 CHRONIC LOW BACK PAIN: Status: ACTIVE | Noted: 2021-12-07

## 2025-08-17 PROBLEM — B37.31 CANDIDIASIS OF VAGINA: Status: ACTIVE | Noted: 2025-04-17

## 2025-08-17 PROBLEM — N39.3 FEMALE STRESS INCONTINENCE: Status: ACTIVE | Noted: 2025-04-17

## 2025-08-17 PROBLEM — M51.27 LUMBOSACRAL DISC HERNIATION: Status: ACTIVE | Noted: 2022-01-13

## 2025-08-17 PROBLEM — N90.89 IRRITATION OF VULVA: Status: ACTIVE | Noted: 2025-04-17

## 2025-08-17 PROBLEM — N89.8 PRURITUS OF VAGINA: Status: ACTIVE | Noted: 2025-04-17

## 2025-08-17 PROBLEM — R35.0 INCREASED FREQUENCY OF URINATION: Status: ACTIVE | Noted: 2025-04-17

## 2025-08-17 PROBLEM — M48.062 SPINAL STENOSIS, LUMBAR REGION WITH NEUROGENIC CLAUDICATION: Status: ACTIVE | Noted: 2022-01-13

## 2025-08-17 RX ORDER — GALCANEZUMAB 120 MG/ML
INJECTION, SOLUTION SUBCUTANEOUS
COMMUNITY
Start: 2025-06-16

## 2025-08-17 RX ORDER — PREDNISONE 20 MG/1
40 TABLET ORAL
Qty: 10 TABLET | Refills: 0 | Status: SHIPPED | OUTPATIENT
Start: 2025-08-17 | End: 2025-08-22

## 2025-08-17 RX ORDER — LUBIPROSTONE 24 UG/1
1 CAPSULE ORAL 2 TIMES DAILY
COMMUNITY

## 2025-08-17 RX ORDER — PRUCALOPRIDE 2 MG/1
1 TABLET, FILM COATED ORAL DAILY
COMMUNITY
Start: 2025-02-20

## 2025-08-17 RX ORDER — ARIPIPRAZOLE 2 MG/1
2 TABLET ORAL DAILY
COMMUNITY

## 2025-08-17 RX ORDER — BACITRACIN ZINC AND POLYMYXIN B SULFATE 500; 1000 [USP'U]/G; [USP'U]/G
OINTMENT TOPICAL
COMMUNITY

## 2025-08-17 RX ORDER — NORETHINDRONE 5 MG/1
1 TABLET ORAL DAILY
COMMUNITY
Start: 2025-01-08

## 2025-08-24 DIAGNOSIS — Z86.69 HISTORY OF MIGRAINE: ICD-10-CM

## 2025-08-24 RX ORDER — RIMEGEPANT SULFATE 75 MG/75MG
TABLET, ORALLY DISINTEGRATING ORAL
Qty: 24 TABLET | Refills: 1 | Status: SHIPPED | OUTPATIENT
Start: 2025-08-24

## 2025-08-28 RX ORDER — BUSPIRONE HYDROCHLORIDE 15 MG/1
15 TABLET ORAL 2 TIMES DAILY
Qty: 60 TABLET | Refills: 5 | Status: SHIPPED | OUTPATIENT
Start: 2025-08-28

## (undated) DEVICE — SYRINGE MED 10ML TRNSLUC BRL PLUNG BLK MRK POLYPR CTRL

## (undated) DEVICE — STANDARD HYPODERMIC NEEDLE,POLYPROPYLENE HUB: Brand: MONOJECT

## (undated) DEVICE — COTTON BALLS: Brand: DEROYAL

## (undated) DEVICE — CORD ES L12FT BPLR FRCP

## (undated) DEVICE — GLOVE ORANGE PI 7 1/2   MSG9075

## (undated) DEVICE — Device

## (undated) DEVICE — SYRINGE, LUER LOCK, 10ML: Brand: MEDLINE

## (undated) DEVICE — SOLUTION IV 1000ML 0.9% SOD CHL

## (undated) DEVICE — SUTURE VCRL SZ 3-0 L18IN ABSRB UD PS-2 L19MM 1/2 CIR J497G

## (undated) DEVICE — FLEXIBLE ADHESIVE BANDAGE: Brand: CURITY

## (undated) DEVICE — GOWN,SIRUS,POLYRNF,SETINSLV,L,20/CS: Brand: MEDLINE

## (undated) DEVICE — SUTURE VCRL + SZ 3-0 L18IN ABSRB UD SH 1/2 CIR TAPERCUT NDL VCP864D

## (undated) DEVICE — ELECTRODE ELECSURG NDL 2.8 INX7.2 CM COAT INSUL EDGE

## (undated) DEVICE — TRAY PREP DRY W/ PREM GLV 2 APPL 6 SPNG 2 UNDPD 1 OVERWRAP

## (undated) DEVICE — SUTURE ETHLN SZ 5-0 L18IN NONABSORBABLE BLK P-3 L13MM 3/8 698G

## (undated) DEVICE — TOWEL,OR,DSP,ST,BLUE,DLX,8/PK,10PK/CS: Brand: MEDLINE

## (undated) DEVICE — GLOVE SURG BEAD CUF 7 STD PF WHT STRL TRIUMPH LT LTX

## (undated) DEVICE — GAUZE,SPONGE,4"X4",16PLY,XRAY,STRL,LF: Brand: MEDLINE

## (undated) DEVICE — SUTURE NONABSORBABLE MONOFILAMENT 4-0 PS-2 18 IN BLK ETHILON 1667G

## (undated) DEVICE — KIT DRESSING EAR ADULT ST

## (undated) DEVICE — CATHETER ETER IV 18GA L125IN POLYUR STR RADPQ INTROCAN SFTY

## (undated) DEVICE — SURE SET-DOUBLE BASIN-LF: Brand: MEDLINE INDUSTRIES, INC.

## (undated) DEVICE — PACK,EENT,TURBAN DRAPE,PK II: Brand: MEDLINE

## (undated) DEVICE — SYRINGE MED 3ML CLR PLAS STD N CTRL LUERLOCK TIP DISP

## (undated) DEVICE — BLADE ES ELASTOMERIC COAT INSUL DURABLE BEND UPTO 90DEG

## (undated) DEVICE — PACK PROCEDURE SURG ORAL CDS

## (undated) DEVICE — DRAPE MICSCP W132XL406CM LENS DIA68MM W VARI LENS2 FOR LEICA

## (undated) DEVICE — COVER LT HNDL BLU PLAS

## (undated) DEVICE — MEDICINE CUPS: Brand: DEROYAL

## (undated) DEVICE — 1010 S-DRAPE TOWEL DRAPE 10/BX: Brand: STERI-DRAPE™

## (undated) DEVICE — BLADE OPHTH GRN ROUNDED TIP 1 SIDE SHRP GRINDLESS MINI-BLDE

## (undated) DEVICE — TUBE SUCT LAPSCP

## (undated) DEVICE — GARMENT,MEDLINE,DVT,INT,CALF,MED, GEN2: Brand: MEDLINE

## (undated) DEVICE — PENCIL ES L3M ROCK SWCH S STL HEX LOK BLDE ELECTRD HOLSTER

## (undated) DEVICE — MEDI-VAC NON-CONDUCTIVE SUCTION TUBING: Brand: CARDINAL HEALTH

## (undated) DEVICE — Z DISCONTINUED USE 2131664 WIPE INSTR L4XW5IN SPNG MEROCEL

## (undated) DEVICE — INTENDED FOR TISSUE SEPARATION, AND OTHER PROCEDURES THAT REQUIRE A SHARP SURGICAL BLADE TO PUNCTURE OR CUT.: Brand: BARD-PARKER ® CARBON RIB-BACK BLADES

## (undated) DEVICE — 1016 S-DRAPE IRRIG POUCH 10/BOX: Brand: STERI-DRAPE™

## (undated) DEVICE — SYSTEM  EUSTACHIAN TUBE DILATION

## (undated) DEVICE — GOWN,SIRUS,POLYRNF,BRTHSLV,XL,30/CS: Brand: MEDLINE

## (undated) DEVICE — SURGIFOAM SPNG SZ 100C

## (undated) DEVICE — BLADE TYMPLSTY W2.5MM 60DEG SHRP ALL ARND BVL DN

## (undated) DEVICE — STAPLER EXT SKIN 35 WIDE S STL STPL SQUEEZE HNDL VISISTAT

## (undated) DEVICE — DRAPE MICSCP W40XL62IN W/ STEREO TB FOR ZEISS

## (undated) DEVICE — SUTURE PLN GUT SZ 5-0 L18IN ABSRB YELLOWISH TAN L13MM PC-1 1915G

## (undated) DEVICE — STOCKINETTE,IMPERVIOUS,12X48,STERILE: Brand: MEDLINE

## (undated) DEVICE — ELECTRODE PT RET AD L9FT HI MOIST COND ADH HYDRGEL CORDED

## (undated) DEVICE — TURNOVER KIT RM INF CTRL TECH

## (undated) DEVICE — GOWN,SIRUS,POLYRNF,SETINSLV,XL,20/CS: Brand: MEDLINE

## (undated) DEVICE — SURGICAL SET UP - SURE SET: Brand: MEDLINE INDUSTRIES, INC.

## (undated) DEVICE — DRESSING EAR AD 5.5IN GLSCOCK

## (undated) DEVICE — BENZOIN COMPOUND TINCTURE 2OZ